# Patient Record
Sex: MALE | Race: WHITE | NOT HISPANIC OR LATINO | Employment: OTHER | URBAN - METROPOLITAN AREA
[De-identification: names, ages, dates, MRNs, and addresses within clinical notes are randomized per-mention and may not be internally consistent; named-entity substitution may affect disease eponyms.]

---

## 2017-01-25 ENCOUNTER — ALLSCRIPTS OFFICE VISIT (OUTPATIENT)
Dept: OTHER | Facility: OTHER | Age: 62
End: 2017-01-25

## 2017-09-05 ENCOUNTER — HOSPITAL ENCOUNTER (OUTPATIENT)
Dept: CT IMAGING | Facility: HOSPITAL | Age: 62
Discharge: HOME | End: 2017-09-05
Attending: THORACIC SURGERY (CARDIOTHORACIC VASCULAR SURGERY)
Payer: COMMERCIAL

## 2017-09-05 DIAGNOSIS — I71.1 RUPTURED ANEURYSM OF THORACIC AORTA (HCC): ICD-10-CM

## 2017-09-05 PROCEDURE — 71250 CT THORAX DX C-: CPT

## 2017-09-13 ENCOUNTER — ALLSCRIPTS OFFICE VISIT (OUTPATIENT)
Dept: OTHER | Facility: OTHER | Age: 62
End: 2017-09-13

## 2017-10-02 ENCOUNTER — HOSPITAL ENCOUNTER (OUTPATIENT)
Dept: NON INVASIVE DIAGNOSTICS | Facility: CLINIC | Age: 62
Discharge: HOME | End: 2017-10-02
Payer: COMMERCIAL

## 2017-10-02 DIAGNOSIS — I77.9 DISORDER OF ARTERY OR ARTERIOLE (HCC): ICD-10-CM

## 2017-10-02 PROCEDURE — 93923 UPR/LXTR ART STDY 3+ LVLS: CPT

## 2017-10-02 PROCEDURE — 93925 LOWER EXTREMITY STUDY: CPT

## 2018-01-04 ENCOUNTER — TRANSCRIBE ORDERS (OUTPATIENT)
Dept: LAB | Facility: CLINIC | Age: 63
End: 2018-01-04

## 2018-01-04 ENCOUNTER — APPOINTMENT (OUTPATIENT)
Dept: LAB | Facility: CLINIC | Age: 63
End: 2018-01-04
Payer: COMMERCIAL

## 2018-01-04 DIAGNOSIS — Q61.3 CONGENITAL POLYCYSTIC KIDNEY DISEASE: ICD-10-CM

## 2018-01-04 DIAGNOSIS — Q61.3 CONGENITAL POLYCYSTIC KIDNEY DISEASE: Primary | ICD-10-CM

## 2018-01-04 LAB
ANION GAP SERPL CALCULATED.3IONS-SCNC: 7 MMOL/L (ref 4–13)
BUN SERPL-MCNC: 29 MG/DL (ref 5–25)
CALCIUM SERPL-MCNC: 9 MG/DL (ref 8.3–10.1)
CHLORIDE SERPL-SCNC: 106 MMOL/L (ref 100–108)
CO2 SERPL-SCNC: 26 MMOL/L (ref 21–32)
CREAT SERPL-MCNC: 1.02 MG/DL (ref 0.6–1.3)
ERYTHROCYTE [DISTWIDTH] IN BLOOD BY AUTOMATED COUNT: 14.6 % (ref 11.6–15.1)
GFR SERPL CREATININE-BSD FRML MDRD: 77 ML/MIN/1.73SQ M
GLUCOSE P FAST SERPL-MCNC: 112 MG/DL (ref 65–99)
HCT VFR BLD AUTO: 42.4 % (ref 36.5–49.3)
HGB BLD-MCNC: 13.7 G/DL (ref 12–17)
MCH RBC QN AUTO: 26.1 PG (ref 26.8–34.3)
MCHC RBC AUTO-ENTMCNC: 32.3 G/DL (ref 31.4–37.4)
MCV RBC AUTO: 81 FL (ref 82–98)
PLATELET # BLD AUTO: 175 THOUSANDS/UL (ref 149–390)
PMV BLD AUTO: 9.2 FL (ref 8.9–12.7)
POTASSIUM SERPL-SCNC: 4.9 MMOL/L (ref 3.5–5.3)
RBC # BLD AUTO: 5.24 MILLION/UL (ref 3.88–5.62)
SODIUM SERPL-SCNC: 139 MMOL/L (ref 136–145)
WBC # BLD AUTO: 5.3 THOUSAND/UL (ref 4.31–10.16)

## 2018-01-04 PROCEDURE — 80048 BASIC METABOLIC PNL TOTAL CA: CPT

## 2018-01-04 PROCEDURE — 36415 COLL VENOUS BLD VENIPUNCTURE: CPT

## 2018-01-04 PROCEDURE — 85027 COMPLETE CBC AUTOMATED: CPT

## 2018-01-10 NOTE — MISCELLANEOUS
Message  Mr Bimal Almaraz was being cared for in our office and Ms Bimal Almaraz was present with her  on 9/13/17  Active Problems    1  Aortic aneurysm, thoracic (441 2) (I71 2)   2  Arterial ectasia (447 8) (I77 9)   3  Benign essential hypertension (401 1) (I10)   4  Congenital polycystic kidney (753 12) (Q61 3)   5  Discoloration of skin (709 00) (L81 9)   6  Hepatic cyst (573 8) (K76 89)   7  Polycystic kidney disease (753 12) (Q61 3)   8  Renal cyst (753 10) (N28 1)   9  Splenic artery aneurysm (442 83) (I72 8)    Current Meds   1  Lisinopril 5 MG Oral Tablet; TAKE ONE (1) TABLET(S) DAILY; Therapy: 89KHU8369 to (Evaluate:62Aal2225)  Requested for: 68Zzw5548; Last   Rx:84Ptm9549; Status: ACTIVE - Retrospective Authorization Ordered   2  Metoprolol Succinate ER 50 MG Oral Tablet Extended Release 24 Hour (Toprol XL);   TAKE 1 AND 1/2 TABLETS DAILY; Therapy: 75ILN4042 to (Evaluate:73Zxz6630)  Requested for: 64Utu0007; Last   Rx:[id number]; Status: ACTIVE - Retrospective Authorization Ordered    Allergies    1  No Known Drug Allergies    2  Bee sting    Plan  Aortic aneurysm, thoracic    · * CT CHEST WO CONTRAST; Status:Need Information - Financial  Authorization,Retrospective Authorization;  Requested for:76Epg8235;    · Follow Up in 2 Years Evaluation and Treatment  Follow-up  Status: Hold For -  Scheduling,Retrospective By Protocol Authorization  Requested for: 76Xip3102    Signatures   Electronically signed by : Jhonny Joy, [facility]; Sep 13 2017  9:31AM EST                       (Author)

## 2018-01-14 VITALS
WEIGHT: 294 LBS | SYSTOLIC BLOOD PRESSURE: 124 MMHG | DIASTOLIC BLOOD PRESSURE: 74 MMHG | HEIGHT: 70 IN | BODY MASS INDEX: 42.09 KG/M2

## 2018-01-14 VITALS
WEIGHT: 297 LBS | OXYGEN SATURATION: 96 % | TEMPERATURE: 97.1 F | RESPIRATION RATE: 14 BRPM | HEART RATE: 56 BPM | DIASTOLIC BLOOD PRESSURE: 78 MMHG | BODY MASS INDEX: 42.52 KG/M2 | SYSTOLIC BLOOD PRESSURE: 122 MMHG | HEIGHT: 70 IN

## 2018-01-23 ENCOUNTER — ALLSCRIPTS OFFICE VISIT (OUTPATIENT)
Dept: OTHER | Facility: OTHER | Age: 63
End: 2018-01-23

## 2018-01-24 NOTE — PROGRESS NOTES
Assessment    1  Congenital polycystic kidney (753 12) (Q61 3)   2  Benign essential hypertension (401 1) (I10)   3  Polycystic kidney disease (753 12) (Q61 3)    Plan  Polycystic kidney disease    · (1) CBC/ PLT (NO DIFF); Status:Active; Requested QTD:47IOJ5364;    Perform:[facility]; FUZ:48CNW2302;IKERAHV; For:Polycystic kidney disease; Ordered By:Shannan Davies;   · (1) COMPREHENSIVE METABOLIC PANEL; Status:Active; Requested PDF:14NVY6354;    Perform:[facility]; CZN:62NOR1579;SDRTDDR; For:Polycystic kidney disease; Ordered By:Shannan Davies;   · (1) URINE PROTEIN CREATININE RATIO; Status:Active; Requested KJC:32RJJ3055;    Perform:[facility]; FME:56HAG8528;HVKMMNN; For:Polycystic kidney disease; Ordered By:Shannan Davies;   · Do not take anti-inflammatory medicines other than aspirin ; Status:Complete;   Done:  38MBL0732   Ordered; For:Polycystic kidney disease; Ordered By:Shannan Davies;   · Restrict the salt in your diet by avoiding highly salted foods ; Status:Complete;   Done:  86HAE7914   Ordered; For:Polycystic kidney disease; Ordered By:Shannan Davies;   · Take your blood pressure twice a day, varying the time of day you check it  Record the  numbers, and bring them with you to your appointment ; Status:Complete;   Done:  24QNT0990   Ordered; For:Polycystic kidney disease; Ordered By:Shannan Davies; Discussion/Summary    1 ) Polycystic kidney disease  - Strong family history with evidence of cysts on both kidneys that are multiple and cyst on the liver  - CTA of the head and neck was negative for aneurysm   No repeat is needed  - Creatinine baseline is around 1 mg/dL  - Urine analysis is bland with no blood and no protein    2 ) Hypertension  - Has a history of a 5 cm thoracic abdominal aortic aneurysm and ideally would aim to keep his systolic blood pressure less than 120 as tolerated  - continue metoprolo 75 mg daily l  -Continue lisinopril 5 mg daily  - Blood pressure slightly above goal in the office but at home it has been ranging right at target  Will continue to monitor  - Educated on sodium restriction diet  - Has some edema of the lower extremities have asked him to avoid salt    3 ) Family history of strokes  - CTA was negative for aneurysm  - Old left thalamic infarct  The patient was counseled regarding diagnostic results, instructions for management, risk factor reductions, prognosis, patient and family education, impressions, risks and benefits of treatment options, importance of compliance with treatment  total time of encounter was 30 minutes  Possible side effects of new medications were reviewed with the patient/guardian today  The treatment plan was reviewed with the patient/guardian  The patient/guardian understands and agrees with the treatment plan   He has no barriers to learning  CKD Teaching includes NFK Guidelines, hypertension management, Avoid nephrotoxic medication, dietician counseling and sodium restriction  Current Patient Status: no anemia and no worsening of renal function  He needs a follow up session in one year   Discussed with the patient      Reason For Visit  Polycystic kidney disease and hypertension      History of Present Illness  I had the pleasure seeing was today in the renal clinic for the continued management of his polycystic kidney disease and hypertension  Since our last visit he has had no ER visits or hospitalizations  He otherwise feels well and has no complaints  He reports no chest pain or shortness of Breath no swelling the legs  He checks his blood pressure about once or twice a week and reports on average it ranges around 015 systolic  His blood pressures at his last Cardiology evaluation were also at target  Overall he is doing quite well with no complaints  Review of Systems    Constitutional: no fever and no chills  Integumentary: no rashes  Gastrointestinal: no nausea, no diarrhea and no vomiting  Respiratory: no shortness of breath     Cardiovascular: no chest pain and no lower extremity edema  Musculoskeletal: no joint pain and no joint swelling  Neurological: no headache, no lightheadedness and no dizziness  Genitourinary: no dysuria  ROS reviewed  Past Medical History    The active problems and past medical history were reviewed and updated today  Surgical History    The surgical history was reviewed and updated today  Family History    The family history was reviewed and updated today  Social History  The social history was reviewed and updated today  The social history was reviewed and is unchanged  Current Meds   1  Lisinopril 5 MG Oral Tablet; TAKE ONE (1) TABLET(S) DAILY; Therapy: 12DFW9425 to (Kim Mccarthy)  Requested for: 58Qcb5842; Last   Rx:[id number] Ordered   2  Metoprolol Succinate ER 50 MG Oral Tablet Extended Release 24 Hour; TAKE 1 AND 1/2   TABLETS DAILY; Therapy: 63UFQ1289 to (Kim Mccarthy)  Requested for: 91Wap2971; Last   Rx:66Rre1669 Ordered    The medication list was reviewed and updated today  Allergies    1  No Known Drug Allergies    2  Bee sting    Vitals  Vital Signs    Recorded: 85CWB0261 10:41AM Recorded: 35UET3279 06:34RM   Systolic 548    Diastolic 80    Height  5 ft 10 in   Weight  299 lb    BMI Calculated  42 9   BSA Calculated  2 48     Physical Exam    Constitutional: General appearance: No acute distress, well appearing and well nourished  ENT: External ears and nose appear normal      Eyes: Anicteric sclerae  Neck: No bruit heard over either carotid  JVD:  No JVD present  Pulmonary: Respiratory effort: No increased work of breathing or signs of respiratory distress  Auscultation of lungs: Clear to auscultation  Cardiovascular: Auscultation of heart: Normal rate and rhythm, normal S1 and S2, without murmurs  Abdomen: Non-tender, no masses  Extremities: No cyanosis, clubbing or edema     Pulses: Dorsalis Pedis and Posterior Tibial pulses normal    Rash: No rash present  Neurologic: Non Focal      Psychiatric: Orientation to person, place, and time: Normal   and Mood and affect: Normal     Back: No CVA tenderness  Results/Data  Diagnostic Studies Reviewed: I personally reviewed the films/images/results in the office today  My interpretation follows  Thank you very much for seeing this patient  If you have any questions, please do not hesitate to contact me        Signatures   Electronically signed by : CARLIE Palacio ; Jan 23 2018 10:43AM EST                       (Author)

## 2018-01-24 NOTE — CONSULTS
I am referring Timothy Moon to you for further evaluation  My most recent evaluation follows:      Reason For Visit  Polycystic kidney disease and hypertension      History of Present Illness  I had the pleasure seeing was today in the renal clinic for the continued management of his polycystic kidney disease and hypertension  Since our last visit he has had no ER visits or hospitalizations  He otherwise feels well and has no complaints  He reports no chest pain or shortness of Breath no swelling the legs  He checks his blood pressure about once or twice a week and reports on average it ranges around 268 systolic  His blood pressures at his last Cardiology evaluation were also at target  Overall he is doing quite well with no complaints  Review of Systems    Constitutional: no fever and no chills  Integumentary: no rashes  Gastrointestinal: no nausea, no diarrhea and no vomiting  Respiratory: no shortness of breath  Cardiovascular: no chest pain and no lower extremity edema  Musculoskeletal: no joint pain and no joint swelling  Neurological: no headache, no lightheadedness and no dizziness  Genitourinary: no dysuria  ROS reviewed  Past Medical History    The active problems and past medical history were reviewed and updated today  Surgical History    The surgical history was reviewed and updated today  Family History    The family history was reviewed and updated today  Social History  The social history was reviewed and updated today  The social history was reviewed and is unchanged  Current Meds   1  Lisinopril 5 MG Oral Tablet; TAKE ONE (1) TABLET(S) DAILY; Therapy: 99RSP9365 to (Munir Ram)  Requested for: 12Dec2017; Last   Rx:01Dec2017 Ordered   2  Metoprolol Succinate ER 50 MG Oral Tablet Extended Release 24 Hour; TAKE 1 AND 1/2   TABLETS DAILY;    Therapy: 47XZI9996 to (Munir Ram)  Requested for: 12Dec2017; Last   Rx:01Dec2017 Ordered    The medication list was reviewed and updated today  Allergies    1  No Known Drug Allergies    2  Bee sting    Vitals   Recorded: 05KDH0463 10:41AM Recorded: 38ISS2759 00:83VX   Systolic 842    Diastolic 80    Height  5 ft 10 in   Weight  299 lb    BMI Calculated  42 9   BSA Calculated  2 48     Physical Exam    Constitutional: General appearance: No acute distress, well appearing and well nourished  ENT: External ears and nose appear normal      Eyes: Anicteric sclerae  Neck: No bruit heard over either carotid  JVD:  No JVD present  Pulmonary: Respiratory effort: No increased work of breathing or signs of respiratory distress  Auscultation of lungs: Clear to auscultation  Cardiovascular: Auscultation of heart: Normal rate and rhythm, normal S1 and S2, without murmurs  Abdomen: Non-tender, no masses  Extremities: No cyanosis, clubbing or edema  Pulses: Dorsalis Pedis and Posterior Tibial pulses normal    Rash: No rash present  Neurologic: Non Focal      Psychiatric: Orientation to person, place, and time: Normal   and Mood and affect: Normal     Back: No CVA tenderness  Results/Data  I personally reviewed the films/images/results in the office today  My interpretation follows  Assessment    1  Congenital polycystic kidney (753 12) (Q61 3)   2  Benign essential hypertension (401 1) (I10)   3  Polycystic kidney disease (753 12) (Q61 3)    Plan  Polycystic kidney disease    · (1) CBC/ PLT (NO DIFF); Status:Active; Requested VFK:89SPT4851;    Perform:[facility]; UNB:06GMF7107;IBYXBOV; For:Polycystic kidney disease; Ordered By:Shannan Davies;   · (1) COMPREHENSIVE METABOLIC PANEL; Status:Active; Requested VXD:93FRF4179;    Perform:[facility]; ITX:09XOC7623;OILNXVK; For:Polycystic kidney disease; Ordered By:Shannan Davies;   · (1) URINE PROTEIN CREATININE RATIO; Status:Active; Requested LUR:78DID5034;    Perform:[facility]; QHM:77UMN5328;GGWRTEX;   For:Polycystic kidney disease; Ordered By:Shannan Davies;   · Do not take anti-inflammatory medicines other than aspirin ; Status:Complete;   Done:  98TKB6366   Ordered; For:Polycystic kidney disease; Ordered By:Shannan Davies;   · Restrict the salt in your diet by avoiding highly salted foods ; Status:Complete;   Done:  76KVO2527   Ordered; For:Polycystic kidney disease; Ordered By:Shannan Davies;   · Take your blood pressure twice a day, varying the time of day you check it  Record the  numbers, and bring them with you to your appointment ; Status:Complete;   Done:  23TOP4959   Ordered; For:Polycystic kidney disease; Ordered By:Shannan Davies; Discussion/Summary    1 ) Polycystic kidney disease  - Strong family history with evidence of cysts on both kidneys that are multiple and cyst on the liver  - CTA of the head and neck was negative for aneurysm  No repeat is needed  - Creatinine baseline is around 1 mg/dL  - Urine analysis is bland with no blood and no protein    2 ) Hypertension  - Has a history of a 5 cm thoracic abdominal aortic aneurysm and ideally would aim to keep his systolic blood pressure less than 120 as tolerated  - continue metoprolo 75 mg daily l  -Continue lisinopril 5 mg daily  - Blood pressure slightly above goal in the office but at home it has been ranging right at target  Will continue to monitor  - Educated on sodium restriction diet  - Has some edema of the lower extremities have asked him to avoid salt    3 ) Family history of strokes  - CTA was negative for aneurysm  - Old left thalamic infarct  The patient was counseled regarding diagnostic results, instructions for management, risk factor reductions, prognosis, patient and family education, impressions, risks and benefits of treatment options, importance of compliance with treatment  total time of encounter was 30 minutes  Possible side effects of new medications were reviewed with the patient/guardian today  The treatment plan was reviewed with the patient/guardian  The patient/guardian understands and agrees with the treatment plan   He has no barriers to learning  CKD Teaching includes NFK Guidelines, hypertension management, Avoid nephrotoxic medication, dietician counseling and sodium restriction  Current Patient Status: no anemia and no worsening of renal function  He needs a follow up session in one year   Discussed with the patient      Thank you very much for seeing this patient  If you have any questions, please do not hesitate to contact me        Signatures   Electronically signed by : CARLIE Latif ; Jan 23 2018 10:43AM EST                       (Author)

## 2018-03-28 DIAGNOSIS — I10 ESSENTIAL HYPERTENSION: Primary | ICD-10-CM

## 2018-03-28 RX ORDER — METOPROLOL SUCCINATE 50 MG/1
1.5 TABLET, EXTENDED RELEASE ORAL DAILY
COMMUNITY
Start: 2016-01-19 | End: 2018-03-28 | Stop reason: SDUPTHER

## 2018-03-29 RX ORDER — METOPROLOL SUCCINATE 50 MG/1
75 TABLET, EXTENDED RELEASE ORAL DAILY
Qty: 135 TABLET | Refills: 3 | Status: SHIPPED | OUTPATIENT
Start: 2018-03-29 | End: 2019-04-03 | Stop reason: SDUPTHER

## 2018-07-16 ENCOUNTER — TELEPHONE (OUTPATIENT)
Dept: VASCULAR SURGERY | Facility: CLINIC | Age: 63
End: 2018-07-16

## 2018-07-16 NOTE — TELEPHONE ENCOUNTER
Received call from pt's wife asking if pt is cleared by Dr Alma Omalley for oral sx  She said a form was sent on 7/5  We do not have any form here, nothing in Dr Malcolm Rodriguez  I called the oral sx office [phone] and asked them to refax over the form

## 2018-07-16 NOTE — TELEPHONE ENCOUNTER
Received fax from [facility] regarding pt's upcoming sx that he needs clearance for  It looks like they are requesting medical clearance  I called pt back and left VM informing him that we cannot provide medical clearance, only vascular  He will need to contact his PCP regarding medical clearance

## 2018-08-04 ENCOUNTER — APPOINTMENT (OUTPATIENT)
Dept: LAB | Facility: HOSPITAL | Age: 63
End: 2018-08-04
Payer: COMMERCIAL

## 2018-08-04 ENCOUNTER — TRANSCRIBE ORDERS (OUTPATIENT)
Dept: ADMINISTRATIVE | Facility: HOSPITAL | Age: 63
End: 2018-08-04

## 2018-08-04 DIAGNOSIS — E78.2 MIXED HYPERLIPIDEMIA: ICD-10-CM

## 2018-08-04 DIAGNOSIS — E78.2 MIXED HYPERLIPIDEMIA: Primary | ICD-10-CM

## 2018-08-04 DIAGNOSIS — Z00.8 HEALTH EXAMINATION IN POPULATION SURVEY: Primary | ICD-10-CM

## 2018-08-04 LAB
ANION GAP SERPL CALCULATED.3IONS-SCNC: 7 MMOL/L (ref 4–13)
BUN SERPL-MCNC: 16 MG/DL (ref 5–25)
CALCIUM SERPL-MCNC: 8.9 MG/DL (ref 8.3–10.1)
CHLORIDE SERPL-SCNC: 104 MMOL/L (ref 100–108)
CHOLEST SERPL-MCNC: 126 MG/DL (ref 50–200)
CO2 SERPL-SCNC: 27 MMOL/L (ref 21–32)
CREAT SERPL-MCNC: 1.02 MG/DL (ref 0.6–1.3)
EST. AVERAGE GLUCOSE BLD GHB EST-MCNC: 108 MG/DL
GFR SERPL CREATININE-BSD FRML MDRD: 77 ML/MIN/1.73SQ M
GLUCOSE P FAST SERPL-MCNC: 94 MG/DL (ref 65–99)
HBA1C MFR BLD: 5.4 % (ref 4.2–6.3)
HDLC SERPL-MCNC: 39 MG/DL (ref 40–60)
LDLC SERPL CALC-MCNC: 68 MG/DL (ref 0–100)
NONHDLC SERPL-MCNC: 87 MG/DL
POTASSIUM SERPL-SCNC: 4.2 MMOL/L (ref 3.5–5.3)
PSA SERPL-MCNC: 2.5 NG/ML (ref 0–4)
SODIUM SERPL-SCNC: 138 MMOL/L (ref 136–145)
TRIGL SERPL-MCNC: 95 MG/DL

## 2018-08-04 PROCEDURE — 36415 COLL VENOUS BLD VENIPUNCTURE: CPT

## 2018-08-04 PROCEDURE — 80048 BASIC METABOLIC PNL TOTAL CA: CPT

## 2018-08-04 PROCEDURE — 83036 HEMOGLOBIN GLYCOSYLATED A1C: CPT | Performed by: PREVENTIVE MEDICINE

## 2018-08-04 PROCEDURE — 80061 LIPID PANEL: CPT | Performed by: INTERNAL MEDICINE

## 2018-08-04 PROCEDURE — 84153 ASSAY OF PSA TOTAL: CPT

## 2018-08-27 DIAGNOSIS — I10 ESSENTIAL HYPERTENSION: Primary | ICD-10-CM

## 2018-08-27 RX ORDER — LISINOPRIL 5 MG/1
1 TABLET ORAL DAILY
COMMUNITY
Start: 2016-07-13 | End: 2018-08-27 | Stop reason: SDUPTHER

## 2018-08-28 RX ORDER — LISINOPRIL 5 MG/1
5 TABLET ORAL DAILY
Qty: 90 TABLET | Refills: 3 | Status: SHIPPED | OUTPATIENT
Start: 2018-08-28 | End: 2019-08-07 | Stop reason: SDUPTHER

## 2018-12-21 ENCOUNTER — TELEPHONE (OUTPATIENT)
Dept: NEPHROLOGY | Facility: CLINIC | Age: 63
End: 2018-12-21

## 2018-12-31 ENCOUNTER — APPOINTMENT (OUTPATIENT)
Dept: LAB | Facility: HOSPITAL | Age: 63
End: 2018-12-31
Attending: INTERNAL MEDICINE
Payer: COMMERCIAL

## 2018-12-31 ENCOUNTER — TRANSCRIBE ORDERS (OUTPATIENT)
Dept: ADMINISTRATIVE | Facility: HOSPITAL | Age: 63
End: 2018-12-31

## 2018-12-31 DIAGNOSIS — Q61.3 CONGENITAL POLYCYSTIC KIDNEY DISEASE: Primary | ICD-10-CM

## 2018-12-31 LAB
ALBUMIN SERPL BCP-MCNC: 3.3 G/DL (ref 3.5–5)
ALP SERPL-CCNC: 59 U/L (ref 46–116)
ALT SERPL W P-5'-P-CCNC: 18 U/L (ref 12–78)
ANION GAP SERPL CALCULATED.3IONS-SCNC: 8 MMOL/L (ref 4–13)
AST SERPL W P-5'-P-CCNC: 8 U/L (ref 5–45)
BASOPHILS # BLD AUTO: 0.02 THOUSANDS/ΜL (ref 0–0.1)
BASOPHILS NFR BLD AUTO: 0 % (ref 0–1)
BILIRUB SERPL-MCNC: 0.3 MG/DL (ref 0.2–1)
BUN SERPL-MCNC: 26 MG/DL (ref 5–25)
CALCIUM SERPL-MCNC: 8.4 MG/DL (ref 8.3–10.1)
CHLORIDE SERPL-SCNC: 107 MMOL/L (ref 100–108)
CO2 SERPL-SCNC: 25 MMOL/L (ref 21–32)
CREAT SERPL-MCNC: 0.89 MG/DL (ref 0.6–1.3)
CREAT UR-MCNC: 177 MG/DL
EOSINOPHIL # BLD AUTO: 0.16 THOUSAND/ΜL (ref 0–0.61)
EOSINOPHIL NFR BLD AUTO: 3 % (ref 0–6)
ERYTHROCYTE [DISTWIDTH] IN BLOOD BY AUTOMATED COUNT: 13.5 % (ref 11.6–15.1)
GFR SERPL CREATININE-BSD FRML MDRD: 90 ML/MIN/1.73SQ M
GLUCOSE P FAST SERPL-MCNC: 102 MG/DL (ref 65–99)
HCT VFR BLD AUTO: 43 % (ref 36.5–49.3)
HGB BLD-MCNC: 13.2 G/DL (ref 12–17)
IMM GRANULOCYTES # BLD AUTO: 0.04 THOUSAND/UL (ref 0–0.2)
IMM GRANULOCYTES NFR BLD AUTO: 1 % (ref 0–2)
LYMPHOCYTES # BLD AUTO: 0.75 THOUSANDS/ΜL (ref 0.6–4.47)
LYMPHOCYTES NFR BLD AUTO: 15 % (ref 14–44)
MCH RBC QN AUTO: 25.5 PG (ref 26.8–34.3)
MCHC RBC AUTO-ENTMCNC: 30.7 G/DL (ref 31.4–37.4)
MCV RBC AUTO: 83 FL (ref 82–98)
MONOCYTES # BLD AUTO: 0.62 THOUSAND/ΜL (ref 0.17–1.22)
MONOCYTES NFR BLD AUTO: 12 % (ref 4–12)
NEUTROPHILS # BLD AUTO: 3.58 THOUSANDS/ΜL (ref 1.85–7.62)
NEUTS SEG NFR BLD AUTO: 69 % (ref 43–75)
NRBC BLD AUTO-RTO: 0 /100 WBCS
PLATELET # BLD AUTO: 184 THOUSANDS/UL (ref 149–390)
PMV BLD AUTO: 10 FL (ref 8.9–12.7)
POTASSIUM SERPL-SCNC: 4 MMOL/L (ref 3.5–5.3)
PROT SERPL-MCNC: 6.9 G/DL (ref 6.4–8.2)
PROT UR-MCNC: 12 MG/DL
PROT/CREAT UR: 0.07 MG/G{CREAT} (ref 0–0.1)
RBC # BLD AUTO: 5.17 MILLION/UL (ref 3.88–5.62)
SODIUM SERPL-SCNC: 140 MMOL/L (ref 136–145)
WBC # BLD AUTO: 5.17 THOUSAND/UL (ref 4.31–10.16)

## 2018-12-31 PROCEDURE — 36415 COLL VENOUS BLD VENIPUNCTURE: CPT | Performed by: INTERNAL MEDICINE

## 2018-12-31 PROCEDURE — 84156 ASSAY OF PROTEIN URINE: CPT | Performed by: INTERNAL MEDICINE

## 2018-12-31 PROCEDURE — 80053 COMPREHEN METABOLIC PANEL: CPT | Performed by: INTERNAL MEDICINE

## 2018-12-31 PROCEDURE — 85025 COMPLETE CBC W/AUTO DIFF WBC: CPT | Performed by: INTERNAL MEDICINE

## 2018-12-31 PROCEDURE — 82570 ASSAY OF URINE CREATININE: CPT | Performed by: INTERNAL MEDICINE

## 2019-01-24 ENCOUNTER — OFFICE VISIT (OUTPATIENT)
Dept: NEPHROLOGY | Facility: CLINIC | Age: 64
End: 2019-01-24
Payer: COMMERCIAL

## 2019-01-24 VITALS — SYSTOLIC BLOOD PRESSURE: 118 MMHG | BODY MASS INDEX: 41.42 KG/M2 | HEIGHT: 71 IN | DIASTOLIC BLOOD PRESSURE: 68 MMHG

## 2019-01-24 DIAGNOSIS — Q61.3 POLYCYSTIC KIDNEY DISEASE: ICD-10-CM

## 2019-01-24 DIAGNOSIS — I10 ESSENTIAL HYPERTENSION: Primary | ICD-10-CM

## 2019-01-24 PROCEDURE — 99214 OFFICE O/P EST MOD 30 MIN: CPT | Performed by: INTERNAL MEDICINE

## 2019-01-24 NOTE — PROGRESS NOTES
NEPHROLOGY OFFICE VISIT   Segundo Goddard 70 y o  male MRN: [id number]  1/24/2019    Reason for Visit:  Polycystic kidney disease    ASSESSMENT and PLAN:    I had the pleasure of seeing Anna Knox today in the renal clinic for the continued management of polycystic kidney disease  Since our last visit, there has been no ER visits or hospitilizations  He currently has no complaints at this time and is feeling well  Patient denies any chest pain, shortness of breath and swelling  The last blood work was done on December 2018, which we have reviewed together  1 ) Polycystic kidney disease  - Strong family history with evidence of cysts on both kidneys that are multiple and cyst on the liver  - CTA of the head and neck was negative for aneurysm  No repeat is needed  - Creatinine baseline is around 1 mg/dL  - Urine analysis is bland with no blood and no protein  -urine protein creatinine ratio is 0 07  -renal function is stable, creatinine 0 89 mg/dL  -reports no hematuria, no flank pain     2 ) Hypertension  - Has a history of a 5 cm thoracic abdominal aortic aneurysm and ideally would aim to keep his systolic blood pressure less than 120 as tolerated  -target blood pressure less than 120/80  - continue metoprolol XL 75 mg daily  -Continue lisinopril 5 mg daily  -blood pressure is under excellent control, continue current management  - Educated on sodium restriction diet     3 ) Family history of strokes  - CTA was negative for aneurysm  - Old left thalamic infarct  PATIENT INSTRUCTIONS:    Patient Instructions   1 )  Low 2 g sodium diet    2 )  Monitor weights at home    3 )  Avoid NSAIDs    4 )  Monitor blood pressure at home, call if blood pressure greater than 150/90 persistently            Orders Placed This Encounter   Procedures    Comprehensive metabolic panel     This is a patient instruction: Patient fasting for 8 hours or longer recommended       Standing Status:   Future     Standing Expiration Date: 1/24/2020    Protein / creatinine ratio, urine    CBC     Standing Status:   Future     Standing Expiration Date:   1/24/2020       OBJECTIVE:  Current Weight:    Vitals:    01/24/19 1041   BP: 118/68   Height: 5' 11" (1 803 m)    Body mass index is 41 42 kg/m²  REVIEW OF SYSTEMS:    Review of Systems   Constitutional: Negative for activity change and fever  Respiratory: Negative for cough, chest tightness, shortness of breath and wheezing  Cardiovascular: Negative for chest pain and leg swelling  Gastrointestinal: Negative for abdominal pain, diarrhea, nausea and vomiting  Endocrine: Negative for polyuria  Genitourinary: Negative for difficulty urinating, dysuria, flank pain, frequency and urgency  Skin: Negative for rash  Neurological: Negative for dizziness, syncope, light-headedness and headaches  PHYSICAL EXAM:      Physical Exam   Constitutional: He is oriented to person, place, and time  He appears well-developed and well-nourished  No distress  HENT:   Head: Normocephalic and atraumatic  Eyes: Pupils are equal, round, and reactive to light  No scleral icterus  Neck: Normal range of motion  Neck supple  Cardiovascular: Normal rate, regular rhythm and normal heart sounds  Exam reveals no gallop and no friction rub  No murmur heard  Pulmonary/Chest: Effort normal and breath sounds normal  No respiratory distress  He has no wheezes  He has no rales  He exhibits no tenderness  Abdominal: Soft  Bowel sounds are normal  He exhibits no distension  There is no tenderness  There is no rebound  Musculoskeletal: Normal range of motion  He exhibits no edema  Neurological: He is alert and oriented to person, place, and time  Skin: No rash noted  He is not diaphoretic  Psychiatric: He has a normal mood and affect  Nursing note and vitals reviewed        Medications:    Current Outpatient Prescriptions:     lisinopril (ZESTRIL) 5 mg tablet, Take 1 tablet (5 mg total) by mouth daily, Disp: 90 tablet, Rfl: 3    metoprolol succinate (TOPROL-XL) 50 mg 24 hr tablet, Take 1 5 tablets (75 mg total) by mouth daily, Disp: 135 tablet, Rfl: 3    Laboratory Results:        Invalid input(s): ALBUMIN    Results for orders placed or performed in visit on 12/31/18   Comprehensive metabolic panel   Result Value Ref Range    Sodium 140 136 - 145 mmol/L    Potassium 4 0 3 5 - 5 3 mmol/L    Chloride 107 100 - 108 mmol/L    CO2 25 21 - 32 mmol/L    ANION GAP 8 4 - 13 mmol/L    BUN 26 (H) 5 - 25 mg/dL    Creatinine 0 89 0 60 - 1 30 mg/dL    Glucose, Fasting 102 (H) 65 - 99 mg/dL    Calcium 8 4 8 3 - 10 1 mg/dL    AST 8 5 - 45 U/L    ALT 18 12 - 78 U/L    Alkaline Phosphatase 59 46 - 116 U/L    Total Protein 6 9 6 4 - 8 2 g/dL    Albumin 3 3 (L) 3 5 - 5 0 g/dL    Total Bilirubin 0 30 0 20 - 1 00 mg/dL    eGFR 90 ml/min/1 73sq m   CBC and differential   Result Value Ref Range    WBC 5 17 4 31 - 10 16 Thousand/uL    RBC 5 17 3 88 - 5 62 Million/uL    Hemoglobin 13 2 12 0 - 17 0 g/dL    Hematocrit 43 0 36 5 - 49 3 %    MCV 83 82 - 98 fL    MCH 25 5 (L) 26 8 - 34 3 pg    MCHC 30 7 (L) 31 4 - 37 4 g/dL    RDW 13 5 11 6 - 15 1 %    MPV 10 0 8 9 - 12 7 fL    Platelets 815 162 - 583 Thousands/uL    nRBC 0 /100 WBCs    Neutrophils Relative 69 43 - 75 %    Immat GRANS % 1 0 - 2 %    Lymphocytes Relative 15 14 - 44 %    Monocytes Relative 12 4 - 12 %    Eosinophils Relative 3 0 - 6 %    Basophils Relative 0 0 - 1 %    Neutrophils Absolute 3 58 1 85 - 7 62 Thousands/µL    Immature Grans Absolute 0 04 0 00 - 0 20 Thousand/uL    Lymphocytes Absolute 0 75 0 60 - 4 47 Thousands/µL    Monocytes Absolute 0 62 0 17 - 1 22 Thousand/µL    Eosinophils Absolute 0 16 0 00 - 0 61 Thousand/µL    Basophils Absolute 0 02 0 00 - 0 10 Thousands/µL   Protein / creatinine ratio, urine   Result Value Ref Range    Creatinine, Ur 177 0 mg/dL    Protein Urine Random 12 mg/dL    Prot/Creat Ratio, Ur 0 07 0 00 - 0 10

## 2019-04-03 DIAGNOSIS — I10 ESSENTIAL HYPERTENSION: ICD-10-CM

## 2019-04-04 RX ORDER — METOPROLOL SUCCINATE 50 MG/1
75 TABLET, EXTENDED RELEASE ORAL DAILY
Qty: 135 TABLET | Refills: 3 | Status: SHIPPED | OUTPATIENT
Start: 2019-04-04 | End: 2020-04-22 | Stop reason: SDUPTHER

## 2019-07-15 DIAGNOSIS — I71.2 THORACIC AORTIC ANEURYSM WITHOUT RUPTURE (HCC): Primary | ICD-10-CM

## 2019-08-07 DIAGNOSIS — I10 ESSENTIAL HYPERTENSION: ICD-10-CM

## 2019-08-08 RX ORDER — LISINOPRIL 5 MG/1
5 TABLET ORAL DAILY
Qty: 90 TABLET | Refills: 3 | Status: SHIPPED | OUTPATIENT
Start: 2019-08-08 | End: 2020-08-06 | Stop reason: SDUPTHER

## 2019-08-22 ENCOUNTER — HOSPITAL ENCOUNTER (OUTPATIENT)
Dept: CT IMAGING | Facility: HOSPITAL | Age: 64
Discharge: HOME | End: 2019-08-22
Attending: THORACIC SURGERY (CARDIOTHORACIC VASCULAR SURGERY)
Payer: COMMERCIAL

## 2019-08-22 DIAGNOSIS — I71.2 THORACIC AORTIC ANEURYSM WITHOUT RUPTURE (HCC): ICD-10-CM

## 2019-08-22 PROCEDURE — 71250 CT THORAX DX C-: CPT

## 2019-09-11 ENCOUNTER — OFFICE VISIT (OUTPATIENT)
Dept: CARDIAC SURGERY | Facility: CLINIC | Age: 64
End: 2019-09-11
Payer: COMMERCIAL

## 2019-09-11 VITALS
DIASTOLIC BLOOD PRESSURE: 84 MMHG | BODY MASS INDEX: 39.62 KG/M2 | OXYGEN SATURATION: 97 % | SYSTOLIC BLOOD PRESSURE: 132 MMHG | RESPIRATION RATE: 14 BRPM | HEART RATE: 51 BPM | HEIGHT: 71 IN | WEIGHT: 283 LBS | TEMPERATURE: 97.4 F

## 2019-09-11 DIAGNOSIS — I71.2 THORACIC AORTIC ANEURYSM WITHOUT RUPTURE (HCC): Primary | ICD-10-CM

## 2019-09-11 PROCEDURE — 99214 OFFICE O/P EST MOD 30 MIN: CPT | Performed by: PHYSICIAN ASSISTANT

## 2019-09-11 RX ORDER — IRON POLYSACCHARIDE COMPLEX 150 MG
150 CAPSULE ORAL DAILY
COMMUNITY
End: 2019-12-12 | Stop reason: ALTCHOICE

## 2019-09-11 NOTE — PROGRESS NOTES
Progress Note- Cardiothoracic Surgery   Marianna Goddard 75 y o  male MRN: [id number]      Reason for Consult / Principal Problem: Aortic aneurysm    History of Present Illness: Reji Humphrey is a 75y o  year old male who presents today for ongoing surveillance of their ascending aortic aneurysm  This was initially identified in 2015  Reji Humphrey was most recently evaluated in our office in 2017  At that time the maximal ascending aortic diameter was measureed at 48 mm  Over the past 2 years, Mr Yuan Eckert reports no changes to his medical history  He reports he is feeling well and denies chest pain, back pain or shortness of breath  He reports his blood pressure is well controlled  Past Medical History:  Past Medical History:   Diagnosis Date    Arterial ectasia ([address] )     Congenital polycystic kidney     Hepatic cyst     Hypertension     Renal cyst     Splenic artery aneurysm (HCC)     Thoracic aneurysm without mention of rupture          Past Surgical History:   Past Surgical History:   Procedure Laterality Date    KNEE SURGERY Bilateral     ROTATOR CUFF REPAIR      SPLENIC ARTERY EMBOLIZATION           Family History:  Family History   Problem Relation Age of Onset    Aneurysm Brother     Aneurysm Maternal Aunt     Aneurysm Family     Heart disease Mother     Heart attack Cousin          Social History:    Social History     Substance and Sexual Activity   Alcohol Use No     Social History     Substance and Sexual Activity   Drug Use No     Social History     Tobacco Use   Smoking Status Former Smoker   Smokeless Tobacco Never Used       Marital Status: [unfilled]    Home Medications:   Prior to Admission medications    Medication Sig Start Date End Date Taking?  Authorizing Provider   lisinopril (ZESTRIL) 5 mg tablet Take 1 tablet (5 mg total) by mouth daily 8/8/19  Yes Kvng Arellano MD   metoprolol succinate (TOPROL-XL) 50 mg 24 hr tablet Take 1 5 tablets (75 mg total) by mouth daily 4/4/19  Yes Juli Guzman MD       Allergies:  No Known Allergies    Review of Systems:  Review of Systems - 12 point ROS negative  History obtained from the patient  General ROS: negative for - chills, fatigue, fever, night sweats, weight gain or weight loss  Respiratory ROS: no cough, shortness of breath, or wheezing  Cardiovascular ROS: no chest pain or dyspnea on exertion  Gastrointestinal ROS: no abdominal pain, change in bowel habits, or black or bloody stools  Musculoskeletal ROS: negative for - gait disturbance, joint stiffness, muscle pain or muscular weakness  Neurological ROS: no TIA or stroke symptoms    Vital Signs:     Vitals:    09/11/19 0800 09/11/19 0837   BP: 142/82 132/84   BP Location: Left arm Right arm   Cuff Size: Adult    Pulse: (!) 51    Resp: 14    Temp: (!) 97 4 °F (36 3 °C)    TempSrc: Tympanic    SpO2: 97%    Weight: 128 kg (283 lb)    Height: 5' 11" (1 803 m)      Invasive Devices     None                 Physical Exam:    HEENT/NECK:  PERRLA  No jugular venous distention  Cardiac:Regular rate and rhythm  Pulmonary:  Breath sounds clear bilaterally  Abdomen:  Non-tender, Non-distended  Positive bowel sounds  Lower extremities: Extremities warm/dry  Radial/PT/DP pulses 2+ bilaterally  No edema B/L  Neuro: Alert and oriented X 3  Sensation is grossly intact  No focal deficits  Skin: Warm/Dry, without rashes or lesions  Lab Results:               Invalid input(s): LABGLOM      Lab Results   Component Value Date    HGBA1C 5 4 08/04/2018     No results found for: CKTOTAL, CKMB, CKMBINDEX, TROPONINI    Imaging Studies:     CT Chest 8/22/19: Stable ascending thoracic aortic caliber measuring up to 4 8 cm      I have personally reviewed pertinent films in PACS    Assessment:  Patient Active Problem List    Diagnosis Date Noted    Polycystic kidney disease 01/24/2019    Essential hypertension 01/24/2019     Ascending aortic aneurysm;  Ongoing ascending aortic aneurysm surveillance  Plan:    CT imaging performed prior to this visit demonstrates the ascending aorta measuring 48 mm in size at its greatest diameter  These findings were confirmed and shared with the patient today  As they are asymptomatic, with no family history, follow-up monitoring is the treatment plan  Arrangements have been made for future surveillance to be completed with CT chest, without contrast in 2 Years  Amos Mora was comfortable with our recommendations, and their questions were answered to their satisfaction  Thank you for allowing us to participate in the care of this patient  Aortic Aneurysm Instructions were provided to the patient as follows:    1  No lifting more than 50 pounds  2  Maintain a controlled blood pressure with a goal of 120/80  3  Follow up in Aortic Clinic as recommended with radiology follow up as instructed  4  Report to the ER or call 911 immediately with the following signs / symptoms: sudden onset of back pain, chest pain or shortness of breath  5  Tobacco cessation discussed      SIGNATURE: Azalea Rojas PA-C  DATE: September 11, 2019  TIME: 8:49 AM

## 2019-09-11 NOTE — LETTER
September 11, 2019     [address]    Patient: Vandana Laura   YOB: 1955   Date of Visit: 9/11/2019       Dear Dr Evangelista Aguilar:    Thank you for referring [facility] DIANA JOHNSON to me for evaluation  Below are my notes for this consultation  If you have questions, please do not hesitate to call me  I look forward to following your patient along with you  Sincerely,        Darwin Snow, DO        CC: No Recipients  [place], [place]  9/11/2019  8:54 AM  Attested  Progress Note- Cardiothoracic Surgery   Beena Goddard 75 y o  male MRN: [id number]      Reason for Consult / Principal Problem: Aortic aneurysm    History of Present Illness: Vandana Laura is a 75y o  year old male who presents today for ongoing surveillance of their ascending aortic aneurysm  This was initially identified in 2015  Vandana Laura was most recently evaluated in our office in 2017  At that time the maximal ascending aortic diameter was measureed at 48 mm  Over the past 2 years, Mr Vu Ellison reports no changes to his medical history  He reports he is feeling well and denies chest pain, back pain or shortness of breath  He reports his blood pressure is well controlled      Past Medical History:  Past Medical History:   Diagnosis Date    Arterial ectasia ([address] )     Congenital polycystic kidney     Hepatic cyst     Hypertension     Renal cyst     Splenic artery aneurysm (HCC)     Thoracic aneurysm without mention of rupture          Past Surgical History:   Past Surgical History:   Procedure Laterality Date    KNEE SURGERY Bilateral     ROTATOR CUFF REPAIR      SPLENIC ARTERY EMBOLIZATION           Family History:  Family History   Problem Relation Age of Onset    Aneurysm Brother     Aneurysm Maternal Aunt     Aneurysm Family     Heart disease Mother     Heart attack Cousin          Social History:    Social History     Substance and Sexual Activity   Alcohol Use No     Social History     Substance and Sexual Activity   Drug Use No     Social History     Tobacco Use   Smoking Status Former Smoker   Smokeless Tobacco Never Used       Marital Status: [unfilled]    Home Medications:   Prior to Admission medications    Medication Sig Start Date End Date Taking? Authorizing Provider   lisinopril (ZESTRIL) 5 mg tablet Take 1 tablet (5 mg total) by mouth daily 8/8/19  Yes Gibson Platt MD   metoprolol succinate (TOPROL-XL) 50 mg 24 hr tablet Take 1 5 tablets (75 mg total) by mouth daily 4/4/19  Yes Gibson Platt MD       Allergies:  No Known Allergies    Review of Systems:  Review of Systems - 12 point ROS negative  History obtained from the patient  General ROS: negative for - chills, fatigue, fever, night sweats, weight gain or weight loss  Respiratory ROS: no cough, shortness of breath, or wheezing  Cardiovascular ROS: no chest pain or dyspnea on exertion  Gastrointestinal ROS: no abdominal pain, change in bowel habits, or black or bloody stools  Musculoskeletal ROS: negative for - gait disturbance, joint stiffness, muscle pain or muscular weakness  Neurological ROS: no TIA or stroke symptoms    Vital Signs:     Vitals:    09/11/19 0800 09/11/19 0837   BP: 142/82 132/84   BP Location: Left arm Right arm   Cuff Size: Adult    Pulse: (!) 51    Resp: 14    Temp: (!) 97 4 °F (36 3 °C)    TempSrc: Tympanic    SpO2: 97%    Weight: 128 kg (283 lb)    Height: 5' 11" (1 803 m)      Invasive Devices     None                 Physical Exam:    HEENT/NECK:  PERRLA  No jugular venous distention  Cardiac:Regular rate and rhythm  Pulmonary:  Breath sounds clear bilaterally  Abdomen:  Non-tender, Non-distended  Positive bowel sounds  Lower extremities: Extremities warm/dry  Radial/PT/DP pulses 2+ bilaterally  No edema B/L  Neuro: Alert and oriented X 3  Sensation is grossly intact  No focal deficits  Skin: Warm/Dry, without rashes or lesions      Lab Results:               Invalid input(s): LABGLOM      Lab Results   Component Value Date    HGBA1C 5 4 08/04/2018     No results found for: CKTOTAL, CKMB, CKMBINDEX, TROPONINI    Imaging Studies:     CT Chest 8/22/19: Stable ascending thoracic aortic caliber measuring up to 4 8 cm      I have personally reviewed pertinent films in PACS    Assessment:  Patient Active Problem List    Diagnosis Date Noted    Polycystic kidney disease 01/24/2019    Essential hypertension 01/24/2019     Ascending aortic aneurysm; Ongoing ascending aortic aneurysm surveillance  Plan:    CT imaging performed prior to this visit demonstrates the ascending aorta measuring 48 mm in size at its greatest diameter  These findings were confirmed and shared with the patient today  As they are asymptomatic, with no family history, follow-up monitoring is the treatment plan  Arrangements have been made for future surveillance to be completed with CT chest, without contrast in 2 Years  Garciadebi Bentley was comfortable with our recommendations, and their questions were answered to their satisfaction  Thank you for allowing us to participate in the care of this patient  Aortic Aneurysm Instructions were provided to the patient as follows:    1  No lifting more than 50 pounds  2  Maintain a controlled blood pressure with a goal of 120/80  3  Follow up in Aortic Clinic as recommended with radiology follow up as instructed  4  Report to the ER or call 911 immediately with the following signs / symptoms: sudden onset of back pain, chest pain or shortness of breath  5  Tobacco cessation discussed  [place], [place]  DATE: September 11, 2019  TIME: 8:49 AM  Attestation signed by Kanchan Vizcarra DO at 9/11/2019  9:27 AM:  The patient was seen and examined, and I agree with the midlevel's history, physical exam, assessment and plan with the following additions:    Dexter Wilhelm was seen in the office for evaluation of his ascending aorta    This is unchanged from his prior study measures approximately 48 mm in size  I recommended continued to year surveillance

## 2019-10-03 ENCOUNTER — APPOINTMENT (OUTPATIENT)
Dept: LAB | Facility: CLINIC | Age: 64
End: 2019-10-03
Payer: COMMERCIAL

## 2019-10-03 ENCOUNTER — OFFICE VISIT (OUTPATIENT)
Dept: FAMILY MEDICINE CLINIC | Facility: CLINIC | Age: 64
End: 2019-10-03
Payer: MEDICARE

## 2019-10-03 VITALS
TEMPERATURE: 97.6 F | WEIGHT: 284.61 LBS | RESPIRATION RATE: 16 BRPM | SYSTOLIC BLOOD PRESSURE: 120 MMHG | OXYGEN SATURATION: 95 % | DIASTOLIC BLOOD PRESSURE: 80 MMHG | HEART RATE: 60 BPM | HEIGHT: 69 IN | BODY MASS INDEX: 42.16 KG/M2

## 2019-10-03 DIAGNOSIS — Z11.59 NEED FOR HEPATITIS C SCREENING TEST: ICD-10-CM

## 2019-10-03 DIAGNOSIS — Z13.220 SCREENING, LIPID: ICD-10-CM

## 2019-10-03 DIAGNOSIS — Z23 ENCOUNTER FOR VACCINATION: ICD-10-CM

## 2019-10-03 DIAGNOSIS — Z12.5 SCREENING PSA (PROSTATE SPECIFIC ANTIGEN): ICD-10-CM

## 2019-10-03 DIAGNOSIS — Z91.030 ALLERGIC TO BEES: ICD-10-CM

## 2019-10-03 DIAGNOSIS — I10 ESSENTIAL HYPERTENSION: Primary | ICD-10-CM

## 2019-10-03 DIAGNOSIS — Z86.73 HX OF ISCHEMIC VERTEBROBASILAR ARTERY THALAMIC STROKE: ICD-10-CM

## 2019-10-03 DIAGNOSIS — I10 ESSENTIAL HYPERTENSION: ICD-10-CM

## 2019-10-03 DIAGNOSIS — Z12.11 SCREEN FOR COLON CANCER: ICD-10-CM

## 2019-10-03 PROBLEM — I72.4 FEMORAL ARTERY ANEURYSM, BILATERAL (HCC): Status: ACTIVE | Noted: 2019-10-03

## 2019-10-03 LAB
ALBUMIN SERPL BCP-MCNC: 3.7 G/DL (ref 3.5–5)
ALP SERPL-CCNC: 47 U/L (ref 46–116)
ALT SERPL W P-5'-P-CCNC: 28 U/L (ref 12–78)
ANION GAP SERPL CALCULATED.3IONS-SCNC: 9 MMOL/L (ref 4–13)
AST SERPL W P-5'-P-CCNC: 21 U/L (ref 5–45)
BILIRUB SERPL-MCNC: 0.6 MG/DL (ref 0.2–1)
BUN SERPL-MCNC: 22 MG/DL (ref 5–25)
CALCIUM SERPL-MCNC: 9.5 MG/DL (ref 8.3–10.1)
CHLORIDE SERPL-SCNC: 104 MMOL/L (ref 100–108)
CHOLEST SERPL-MCNC: 177 MG/DL (ref 50–200)
CO2 SERPL-SCNC: 27 MMOL/L (ref 21–32)
CREAT SERPL-MCNC: 1.01 MG/DL (ref 0.6–1.3)
GFR SERPL CREATININE-BSD FRML MDRD: 77 ML/MIN/1.73SQ M
GLUCOSE P FAST SERPL-MCNC: 98 MG/DL (ref 65–99)
HCV AB SER QL: NORMAL
HDLC SERPL-MCNC: 54 MG/DL (ref 40–60)
LDLC SERPL CALC-MCNC: 101 MG/DL (ref 0–100)
POTASSIUM SERPL-SCNC: 4.7 MMOL/L (ref 3.5–5.3)
PROT SERPL-MCNC: 7.7 G/DL (ref 6.4–8.2)
PSA SERPL-MCNC: 3.2 NG/ML (ref 0–4)
SODIUM SERPL-SCNC: 140 MMOL/L (ref 136–145)
TRIGL SERPL-MCNC: 112 MG/DL

## 2019-10-03 PROCEDURE — G0103 PSA SCREENING: HCPCS

## 2019-10-03 PROCEDURE — 90670 PCV13 VACCINE IM: CPT | Performed by: FAMILY MEDICINE

## 2019-10-03 PROCEDURE — 90715 TDAP VACCINE 7 YRS/> IM: CPT | Performed by: FAMILY MEDICINE

## 2019-10-03 PROCEDURE — 99203 OFFICE O/P NEW LOW 30 MIN: CPT | Performed by: FAMILY MEDICINE

## 2019-10-03 PROCEDURE — 80061 LIPID PANEL: CPT

## 2019-10-03 PROCEDURE — 80053 COMPREHEN METABOLIC PANEL: CPT

## 2019-10-03 PROCEDURE — 90471 IMMUNIZATION ADMIN: CPT | Performed by: FAMILY MEDICINE

## 2019-10-03 PROCEDURE — 36415 COLL VENOUS BLD VENIPUNCTURE: CPT

## 2019-10-03 PROCEDURE — 86803 HEPATITIS C AB TEST: CPT

## 2019-10-03 PROCEDURE — G0009 ADMIN PNEUMOCOCCAL VACCINE: HCPCS | Performed by: FAMILY MEDICINE

## 2019-10-03 RX ORDER — EPINEPHRINE 0.3 MG/.3ML
0.3 INJECTION SUBCUTANEOUS
COMMUNITY
Start: 2018-09-07 | End: 2019-10-03 | Stop reason: SDUPTHER

## 2019-10-03 RX ORDER — EPINEPHRINE 0.3 MG/.3ML
0.3 INJECTION SUBCUTANEOUS ONCE
Qty: 0.6 ML | Refills: 0 | Status: SHIPPED | OUTPATIENT
Start: 2019-10-03 | End: 2022-05-20 | Stop reason: SDUPTHER

## 2019-10-03 NOTE — PROGRESS NOTES
Assessment/Plan:       Problem List Items Addressed This Visit        Cardiovascular and Mediastinum    Essential hypertension - Primary     Stressed importance of good control given his multiple aneurysms  Currently controlled         Relevant Orders    Comprehensive metabolic panel (Completed)    Lipid Panel with Direct LDL reflex (Completed)       Other    Hx of ischemic vertebrobasilar artery thalamic stroke     Sensory loss on entire R side of body since age 21  We discussed the importance of having his wife (and himself) check for skin lesions/wound on his R side since he has no sensation on that side  Since he has dealt with this for quite some time, he has found ways to adapt and he uses only white towels so he will notice blood in case he has a cut/wound, he checks skin, 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  Other Visit Diagnoses     Encounter for vaccination        Relevant Orders    TDAP VACCINE GREATER THAN OR EQUAL TO 6YO IM (Completed)    PNEUMOCOCCAL CONJUGATE VACCINE 13-VALENT GREATER THAN 6 MONTHS (Completed)    Screen for colon cancer        Relevant Orders    Ambulatory referral to Colorectal Surgery    Allergic to bees        Screening, lipid        Screening PSA (prostate specific antigen)        Relevant Orders    PSA, Total Screen (Completed)    Need for hepatitis C screening test        Relevant Orders    Hepatitis C antibody (Completed)         Declines flu vax        Subjective:     Shay Paredes is a 75 y o  male here today and has the below chronic conditions:    Patient Active Problem List   Diagnosis    Polycystic kidney disease    Essential hypertension    [address] [address]    Aortic aneurysm, thoracic (HCC)    Splenic artery aneurysm (HCC)    Hepatic cyst     Current Outpatient Medications   Medication Sig Dispense Refill    iron polysaccharides (FERREX) 150 mg capsule Take 150 mg by mouth daily      lisinopril (ZESTRIL) 5 mg tablet Take 1 tablet (5 mg total) by mouth daily 90 tablet 3    metoprolol succinate (TOPROL-XL) 50 mg 24 hr tablet Take 1 5 tablets (75 mg total) by mouth daily 135 tablet 3     No current facility-administered medications for this visit  HPI:  Chief Complaint   Patient presents with    Physical Exam     - CC above per clinical staff and reviewed  Pt here to establish  Has been feeling well overall  Following w/ cardiology for thoracic aortic aneurysm    Has femoral aotric aneurysms  Due for f/u  Chronic pain R shoulder  4 rotator cuff surgeries  Wasn't able to re-attach  A tendon  No sensation     Hx of CVA which he was not aware of until he had an MRI  Had this when he was 21  L thalamic stroke  Notes some HA occasional  Recently had some neck pain and HA for a few days  This has resolved  Has home bp machine and bp was fine  Admits not drinking enough fluid  The following portions of the patient's history were reviewed and updated as appropriate: allergies, current medications, past family history, past medical history, past social history, past surgical history and problem list     ROS:  Review of Systems   No fever, chills, congestion, chest pain, shortness of breath, nausea, vomiting, diarrhea, constipation, blood in stool, urinary concerns, mood changes  Rest of ROS neg except as above  Objective:      /80   Pulse 60   Temp 97 6 °F (36 4 °C)   Resp 16   Ht 5' 8 9" (1 75 m)   Wt 129 kg (284 lb 9 8 oz)   SpO2 95%   BMI 42 15 kg/m²   BP Readings from Last 3 Encounters:   10/03/19 120/80   09/11/19 132/84   01/24/19 118/68     Wt Readings from Last 3 Encounters:   10/03/19 129 kg (284 lb 9 8 oz)   09/11/19 128 kg (283 lb)   09/13/17 135 kg (297 lb)               Physical Exam:   Physical Exam   Constitutional: He is oriented to person, place, and time  He appears well-developed and well-nourished  HENT:   Head: Normocephalic and atraumatic     Eyes: Conjunctivae are normal  Neck: Neck supple  Cardiovascular: Normal rate, regular rhythm and normal heart sounds  No murmur heard  Pulmonary/Chest: Effort normal and breath sounds normal  No respiratory distress  He has no wheezes  Abdominal: Soft  There is no tenderness  There is no rebound and no guarding  Obese abdomen   Musculoskeletal: He exhibits edema (1+ b/l ankles)  Lymphadenopathy:     He has no cervical adenopathy  Neurological: He is alert and oriented to person, place, and time  R hemisensory loss to light and deep touch   Skin: Skin is warm and dry  Psychiatric: He has a normal mood and affect  His behavior is normal    Nursing note and vitals reviewed  BMI Counseling: Body mass index is 42 15 kg/m²  Discussed the patient's BMI with him  The BMI is above normal  Nutrition recommendations include reducing portion sizes

## 2019-10-04 NOTE — ASSESSMENT & PLAN NOTE
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

## 2019-10-09 ENCOUNTER — TELEPHONE (OUTPATIENT)
Dept: FAMILY MEDICINE CLINIC | Facility: CLINIC | Age: 64
End: 2019-10-09

## 2019-10-09 PROBLEM — E78.5 HYPERLIPIDEMIA, MILD: Status: ACTIVE | Noted: 2019-10-09

## 2019-10-09 NOTE — TELEPHONE ENCOUNTER
Would like a response from the my chart message from 10/04/19  You can leave a detailed message on his machine or they can call back

## 2019-10-09 NOTE — PROGRESS NOTES
The 10-year ASCVD risk score (Rubio Fitzpatrick et al , 2013) is: 12 7%    Values used to calculate the score:      Age: 75 years      Sex: Male      Is Non- : No      Diabetic: No      Tobacco smoker: No      Systolic Blood Pressure: 211 mmHg      Is BP treated: Yes      HDL Cholesterol: 54 mg/dL      Total Cholesterol: 177 mg/dL

## 2019-10-10 NOTE — TELEPHONE ENCOUNTER
I just sent a [employer] message back to him- will also send note to be sure they rec'd note about labs

## 2019-10-21 DIAGNOSIS — I72.4 FEMORAL ARTERY ANEURYSM, BILATERAL (HCC): Primary | ICD-10-CM

## 2019-10-28 ENCOUNTER — HOSPITAL ENCOUNTER (OUTPATIENT)
Dept: RADIOLOGY | Facility: HOSPITAL | Age: 64
Discharge: HOME | End: 2019-10-28
Payer: COMMERCIAL

## 2019-10-28 DIAGNOSIS — I72.4 FEMORAL ARTERY ANEURYSM, BILATERAL (HCC): ICD-10-CM

## 2019-10-28 PROCEDURE — 93923 UPR/LXTR ART STDY 3+ LVLS: CPT

## 2019-10-28 PROCEDURE — 93925 LOWER EXTREMITY STUDY: CPT

## 2019-10-29 PROCEDURE — 93925 LOWER EXTREMITY STUDY: CPT | Performed by: SURGERY

## 2019-10-29 PROCEDURE — 93922 UPR/L XTREMITY ART 2 LEVELS: CPT | Performed by: SURGERY

## 2019-11-15 ENCOUNTER — OFFICE VISIT (OUTPATIENT)
Dept: VASCULAR SURGERY | Facility: CLINIC | Age: 64
End: 2019-11-15
Payer: COMMERCIAL

## 2019-11-15 VITALS
DIASTOLIC BLOOD PRESSURE: 76 MMHG | RESPIRATION RATE: 16 BRPM | HEART RATE: 62 BPM | HEIGHT: 71 IN | TEMPERATURE: 98 F | WEIGHT: 288 LBS | SYSTOLIC BLOOD PRESSURE: 122 MMHG | BODY MASS INDEX: 40.32 KG/M2

## 2019-11-15 DIAGNOSIS — I10 ESSENTIAL HYPERTENSION: ICD-10-CM

## 2019-11-15 DIAGNOSIS — I72.4 POPLITEAL ARTERY ANEURYSM, BILATERAL (HCC): Primary | ICD-10-CM

## 2019-11-15 DIAGNOSIS — E78.5 HYPERLIPIDEMIA, MILD: ICD-10-CM

## 2019-11-15 DIAGNOSIS — I71.2 THORACIC AORTIC ANEURYSM WITHOUT RUPTURE (HCC): ICD-10-CM

## 2019-11-15 DIAGNOSIS — I72.4 FEMORAL ARTERY ANEURYSM, BILATERAL (HCC): ICD-10-CM

## 2019-11-15 PROCEDURE — 99204 OFFICE O/P NEW MOD 45 MIN: CPT | Performed by: PHYSICIAN ASSISTANT

## 2019-11-15 NOTE — LETTER
November 17, 2019     [address]  [address] [facility]    Patient: Yenifer Goddard   YOB: 1955   Date of Visit: 11/15/2019     Dear Dr Aranza Heaton      Thank you for referring Inge Adams to me for evaluation  Below are the relevant portions of my assessment and plan of care  If you have questions, please do not hesitate to call me  I look forward to following Les along with you  Sincerely,        Contreras Hubbard PA-C        CC: Referral Self    Progress Notes:      Assessment/Plan:    Femoral artery aneurysm, bilateral (HCC)    Femoral artery ectasias  Popliteal artery ectasias  Hypertension  Hyperlipidemia  Venous stasis changes/ edema  Hx stroke at age 21 with RIGHT sided sensory loss  Ascending thoracic aneurysm followed by Dr Elease Lanes    -No chest pain, pressure, shortness of breath  No claudication  No abdominal pain, back pain     -Current medications include: Toprol XL 50 and lisinopril 5  -He is not on asa or statin therapy  -LDL goal < 100, but idealy < 70  -No ASA due to PKD  -Remote smoker   -Family history of aneurysms    STEPHEN 10/28/19  R  0 83 /+254/166; no significant arterial occlusive disease; ectatic SFA 1 1 cm/ popliteal 0 8 cm  L 0 1 02/+254/163; no significant arterial occlusive disease; ectatic SFA 1 0 cm/ popliteal 1 0 cm    Lipids  Tg 112 H 54 L101    Discussion:  Patient with 4 8 cm thoracic aneurysm followed by CTS  On PE, he has a prominent RIGHT femoral pulse  He is found to have femoral artery and popliteal ectasias on imaging studies  He may also have family history of aneurysms  Currently, the femoral and popliteal enlargements do not meet criteria to be considered aneurysms  They can be monitored annually by duplex at this point  He is not known to have a AAA  We will do a screening aortoilliac duplex  He was told not to take aspirin due to polycystic kidney disease   He is not on a statin but with aneurysms, we discussed that we should consider it which is something that he will think about      -Recommend optimal medical therapy with good blood pressure and cholesterol control  -LDL goal < 100 (ideally < 70)    He is not on a statin but given vascular disease, we should consider statin therapy   -Blood pressure in the office looks good today; 122/76; monitor BP at home   -We discussed healthy lifestyle changes - diet, wt loss and regular exercise    -Trial of compression stockings and conservative measures for leg edema   -Check aorto-iliac study to rule out aneurysm  -Follow up lower extremity duplex in 1 year to monitor femoral and popliteal ectasias  -Patient education provided regarding aneurysm   -Screening of adult children  -Follow up in 1 year    Aortic aneurysm, thoracic (HCC)  -4 8 cm Ascending TAA followed by CTS    Popliteal artery ectasias, bilateral (HCC)  -R 0 8 cm, L 1 cm  -annual surveillance as discussed under femoral ectasias

## 2019-11-15 NOTE — PROGRESS NOTES
Assessment/Plan:    Femoral artery aneurysm, bilateral (HCC)    Femoral artery ectasias  Popliteal artery ectasias  Hypertension  Hyperlipidemia  Venous stasis changes/ edema  Hx stroke at age 21 with RIGHT sided sensory loss  Ascending thoracic aneurysm followed by Dr Linda Rodriguez    -No chest pain, pressure, shortness of breath  No claudication  No abdominal pain, back pain     -Current medications include: Toprol XL 50 and lisinopril 5  -He is not on asa or statin therapy  -LDL goal < 100, but idealy < 70  -No ASA due to PKD  -Remote smoker   -Family history of aneurysms    STEPHEN 10/28/19  R  0 83 /+254/166; no significant arterial occlusive disease; ectatic SFA 1 1 cm/ popliteal 0 8 cm  L 0 1 02/+254/163; no significant arterial occlusive disease; ectatic SFA 1 0 cm/ popliteal 1 0 cm    Lipids  Tg 112 H 54 L101    Discussion:  Patient with 4 8 cm thoracic aneurysm followed by CTS  On PE, he has a prominent RIGHT femoral pulse  He is found to have femoral artery and popliteal ectasias on imaging studies  He may also have family history of aneurysms  Currently, the femoral and popliteal enlargements do not meet criteria to be considered aneurysms  They can be monitored annually by duplex at this point  He is not known to have a AAA  We will do a screening aortoilliac duplex  He was told not to take aspirin due to polycystic kidney disease  He is not on a statin but with aneurysms, we discussed that we should consider it which is something that he will think about      -Recommend optimal medical therapy with good blood pressure and cholesterol control  -LDL goal < 100 (ideally < 70)    He is not on a statin but given vascular disease, we should consider statin therapy   -Blood pressure in the office looks good today; 122/76; monitor BP at home   -We discussed healthy lifestyle changes - diet, wt loss and regular exercise    -Trial of compression stockings and conservative measures for leg edema   -Check aorto-iliac study to rule out aneurysm  -Follow up lower extremity duplex in 1 year to monitor femoral and popliteal ectasias  -Patient education provided regarding aneurysm   -Screening of adult children  -Follow up in 1 year    Aortic aneurysm, thoracic (HCC)  -4 8 cm Ascending TAA followed by CTS    Popliteal artery ectasias, bilateral (HCC)  -R 0 8 cm, L 1 cm  -annual surveillance as discussed under femoral ectasias         Subjective:      Patient ID: Rosalie Blank is a 75 y o  male  Pt is new to our practice and is here to be evaluated for bilateral lower extremity femoral artery aneurysms  Pt denies any claudication with walking  Pt also denies any numbness or tingling in his legs  Pt had a STEPHEN on 10/28/19  Pt is not taking any blood thinning medications or statins  HPI    Les Goddard 75 y o  M 4 8 cm throracic aortic aneurysm, hypertension, hyperlipidemia, chronic kidney disease who presents for evaluation of femoral and popliteal ectasias  Alma Delia Ro has no chest pain, sob, abdominal pain, or claudication  STEPHEN 10/28/19  R  0 83 /+254/166; no significant arterial occlusive disease; ectatic SFA 1 1 cm/ popliteal 0 8 cm  L 0 1 02/+254/163; no significant arterial occlusive disease; ectatic SFA 1 0 cm/ popliteal 1 0 cm      The following portions of the patient's history were reviewed and updated as appropriate: allergies, current medications, past family history, past medical history, past social history, past surgical history and problem list     Review of Systems   Constitutional: Negative  HENT: Positive for nosebleeds  Eyes: Negative  Respiratory: Negative  Cardiovascular: Negative  Gastrointestinal: Negative  Endocrine: Negative  Genitourinary: Negative  Musculoskeletal: Positive for arthralgias  Skin: Negative  Allergic/Immunologic: Negative  Neurological: Positive for numbness  Hematological: Negative  Psychiatric/Behavioral: Negative  Objective:      /76 (BP Location: Left arm, Patient Position: Sitting, Cuff Size: Adult)   Pulse 62   Temp 98 °F (36 7 °C) (Tympanic)   Resp 16   Ht 5' 11" (1 803 m)   Wt 131 kg (288 lb)   BMI 40 17 kg/m²      Physical Exam   Constitutional: He is oriented to person, place, and time  He appears well-developed and well-nourished  He is cooperative  HENT:   Head: Normocephalic and atraumatic  Eyes: Pupils are equal, round, and reactive to light  EOM are normal    Neck: Trachea normal  Neck supple  No JVD present  No thyromegaly present  Cardiovascular: Normal rate, regular rhythm, S1 normal, S2 normal and normal heart sounds  Exam reveals no gallop and no friction rub  No murmur heard  Pulses:       Carotid pulses are 2+ on the right side, and 2+ on the left side  Radial pulses are 2+ on the right side, and 2+ on the left side  Femoral pulses are 3+ on the right side, and 2+ on the left side  Posterior tibial pulses are 2+ on the right side  Pulmonary/Chest: Effort normal and breath sounds normal  No accessory muscle usage  No respiratory distress  He has no wheezes  He has no rales  Abdominal: Soft  Bowel sounds are normal  He exhibits no distension  There is no hepatosplenomegaly  There is no tenderness  Musculoskeletal: Normal range of motion  He exhibits edema (1+ pitting edema)  He exhibits no deformity  Neurological: He is alert and oriented to person, place, and time  Grossly normal    Skin: Skin is warm and dry  No lesion and no rash noted  No cyanosis  Nails show no clubbing  Psychiatric: He has a normal mood and affect  Nursing note and vitals reviewed        STEPHEN 10/28/19  FINDINGS:     Right                  Impression  Waveform   PSV  AP (cm)    Common Femoral Artery  Normal                 113             Prox Profunda                                  57             Prox SFA               Ectatic                 88      0 8    Mid SFA Ectatic                 73      1 0    Dist SFA               Ectatic                 88      0 9    Proximal Pop           Ectatic                 54      0 8    Distal Pop             Ectatic                 63      0 8    Tibioperoneal                                  33             Dist Post Tibial                   Triphasic  100             Prox  Ant  Tibial                              45             Dist  Ant  Tibial                  Triphasic   58             Dist Peroneal                      Triphasic   39                Left                   Impression  Waveform   PSV  AP (cm)    Common Femoral Artery  Normal                  69             Prox Profunda                                  54             Prox SFA               Ectatic                 62      0 9    Mid SFA                Ectatic                 72      1 0    Dist SFA               Ectatic                 72      0 8    Proximal Pop           Ectatic                 63      0 9    Distal Pop             Ectatic                 52      0 8    Tibioperoneal                                  40             Dist Post Tibial                   Triphasic   81             Prox  Ant  Tibial                              35             Dist  Ant  Tibial                  Triphasic  121             Dist Peroneal                      Triphasic   68                      CONCLUSION:     Impression:  RIGHT LOWER LIMB:  Evaluation shows no evidence of significant lower extremity arterial occlusive  disease  There is an ectatic superficial femoral artery 1 1 cm prior 0 9cm, popliteal  artery 0 83 prior 1 0  Ankle/Brachial index:  1 05 , Prior 1 14  PVR/ PPG tracings are normal   Metatarsal pressure of  +254mmHg, [id number]  Great toe pressure of  166mmHg, within the healing range, Prior 119mmHg  LEFT LOWER LIMB:  Evaluation shows no evidence of significant lower extremity arterial occlusive  disease    There is an ectatic superficial femoral artery 1 0 cm prior 1 1cm, popliteal  artery 1 0cm prior 1 0cm  Ankle/Brachial index: 1 02 , Prior 1 16  PVR/ PPG tracings are normal or dampened  Metatarsal pressure of  +254mmHg, Prior 183 mmHg  Great toe pressure of  163mmHg, within the healing range, Prior 129mmHg  There is a well defined hypoechoic non-vascularized cystic-type structure noted  in the popliteal fossa  Compared to previous study on 10/02/2017  , there is no significant change  I have reviewed and made appropriate changes to the review of systems input by the medical assistant  Vitals:    11/15/19 1018   BP: 122/76   BP Location: Left arm   Patient Position: Sitting   Cuff Size: Adult   Pulse: 62   Resp: 16   Temp: 98 °F (36 7 °C)   TempSrc: Tympanic   Weight: 131 kg (288 lb)   Height: 5' 11" (1 803 m)       Patient Active Problem List   Diagnosis    Polycystic kidney disease    Essential hypertension    Arterial ectasia (HCC)    Aortic aneurysm, thoracic (HCC)    Hepatic cyst    Femoral artery aneurysm, bilateral (HCC)    Hx of ischemic vertebrobasilar artery thalamic stroke    Hyperlipidemia, mild    Popliteal artery ectasias, bilateral (HCC)       Past Surgical History:   Procedure Laterality Date    KNEE SURGERY Bilateral     LITHOTRIPSY  05/12/2010    ROTATOR CUFF REPAIR      SHOULDER SURGERY Bilateral 03/28/1986    contusion     SPLENIC ARTERY EMBOLIZATION         Family History   Problem Relation Age of Onset    Aneurysm Brother         Ascending aortic aneurysm    Aneurysm Maternal Aunt     Heart disease Cousin     Heart disease Mother     Emphysema Father     No Known Problems Paternal Grandmother     No Known Problems Paternal Grandfather        Social History     Socioeconomic History    Marital status: /Civil Union     Spouse name:  Ree Estimable     Number of children: 2    Years of education: Not on file    Highest education level: Not on file   Occupational History    Occupation: RETIRED   Social Needs  Financial resource strain: Not on file   [address] insecurity:     Worry: Not on file     Inability: Not on file    Transportation needs:     Medical: Not on file     Non-medical: Not on file   Tobacco Use    Smoking status: Former Smoker     Packs/day: 2 00     Years: 23 00     Pack years: 46 00     Types: Cigarettes     Last attempt to quit: 10/3/1989     Years since quittin 1    Smokeless tobacco: Never Used   Substance and Sexual Activity    Alcohol use: Yes     Frequency: Monthly or less     Drinks per session: 1 or 2     Binge frequency: Never    Drug use: No    Sexual activity: Not on file   Lifestyle    Physical activity:     Days per week: Not on file     Minutes per session: Not on file    Stress: Not on file   Relationships    Social connections:     Talks on phone: Not on file     Gets together: Not on file     Attends Bahai service: Not on file     Active member of club or organization: Not on file     Attends meetings of clubs or organizations: Not on file     Relationship status: Not on file    Intimate partner violence:     Fear of current or ex partner: Not on file     Emotionally abused: Not on file     Physically abused: Not on file     Forced sexual activity: Not on file   Other Topics Concern    Not on file   Social History Narrative    Exercises regularly        Allergies   Allergen Reactions    Hymenoptera Venom Preparations Anaphylaxis         Current Outpatient Medications:     EPINEPHrine (EPIPEN 2-STEPHANI) 0 3 mg/0 3 mL SOAJ, Inject 0 3 mL (0 3 mg total) into a muscle once for 1 dose Then go to to the ER, Disp: 0 6 mL, Rfl: 0    lisinopril (ZESTRIL) 5 mg tablet, Take 1 tablet (5 mg total) by mouth daily, Disp: 90 tablet, Rfl: 3    metoprolol succinate (TOPROL-XL) 50 mg 24 hr tablet, Take 1 5 tablets (75 mg total) by mouth daily, Disp: 135 tablet, Rfl: 3    Elastic Bandages & Supports ([address]) MISC, by Does not apply route daily Knee High 20-30mmHg, Disp: 2 each, Rfl: 4    iron polysaccharides (FERREX) 150 mg capsule, Take 150 mg by mouth daily, Disp: , Rfl:

## 2019-11-15 NOTE — PATIENT INSTRUCTIONS
Femoral artery ectasias  Popliteal artery ectasias  Hypertension  Hyperlipidemia  Venous stasis changes/ edema  Hx stroke at age 21 with RIGHT sensory loss  Ascending thoracic aneurysm followed by Dr Kevin Cancino      Lipids  Tg 112 H 54 L101    -No chest pain, pressure, shortness of breath  No claudication  No abdominal pain, back pain     -Current medications include: Toprol XL 50 and lisinopril 5  -He is not on asa or statin therapy  -LDL goal < 100, but idealy < 70  -No ASA due to PKD  -Remote smoker   -Family history of aneurysms       -Recommend optimal medical therapy with good blood pressure and cholesterol control  -LDL goal < 100 (ideally < 70)  He is not on a statin but given vascular disease, we should consider statin therapy   -Blood pressure in the office looks good today; 122/76 elevated today in the office which should be watched     -Good heart healthy diet, weight loss and regular exercise for overall cardiovascular health and fitness   -Trial of compression stockings and conservative measures for edema   -Check aorto-iliac study to rule out aneurysm  -Follow up lower extremity duplex in 1 year to monitor SFA and popliteal ectasias  -Patient education provided regarding aneurysm   -Screening of adult children    STEPHEN 10/28/19  R  0 83 /+254/166; no significant arterial occlusive disease; ectatic SFA 1 1 cm/ popliteal 0 8 cm  L 0 1 02/+254/163; no significant arterial occlusive disease; ectatic SFA 1 0 cm/ popliteal 1 0 cm

## 2019-11-17 NOTE — ASSESSMENT & PLAN NOTE
Femoral artery ectasias  Popliteal artery ectasias  Hypertension  Hyperlipidemia  Venous stasis changes/ edema  Hx stroke at age 21 with RIGHT sided sensory loss  Ascending thoracic aneurysm followed by Dr Elease Lanes    -No chest pain, pressure, shortness of breath  No claudication  No abdominal pain, back pain     -Current medications include: Toprol XL 50 and lisinopril 5  -He is not on asa or statin therapy  -LDL goal < 100, but idealy < 70  -No ASA due to PKD  -Remote smoker   -Family history of aneurysms    STEPHEN 10/28/19  R  0 83 /+254/166; no significant arterial occlusive disease; ectatic SFA 1 1 cm/ popliteal 0 8 cm  L 0 1 02/+254/163; no significant arterial occlusive disease; ectatic SFA 1 0 cm/ popliteal 1 0 cm    Lipids  Tg 112 H 54 L101    Discussion:  Patient with 4 8 cm thoracic aneurysm followed by CTS  On PE, he has a prominent RIGHT femoral pulse  He is found to have femoral artery and popliteal ectasias on imaging studies  He may also have family history of aneurysms  Currently, the femoral and popliteal enlargements do not meet criteria to be considered aneurysms  They can be monitored annually by duplex at this point  He is not known to have a AAA  We will do a screening aortoilliac duplex  He was told not to take aspirin due to polycystic kidney disease  He is not on a statin but with aneurysms, we discussed that we should consider it which is something that he will think about      -Recommend optimal medical therapy with good blood pressure and cholesterol control  -LDL goal < 100 (ideally < 70)    He is not on a statin but given vascular disease, we should consider statin therapy   -Blood pressure in the office looks good today; 122/76; monitor BP at home   -We discussed healthy lifestyle changes - diet, wt loss and regular exercise    -Trial of compression stockings and conservative measures for leg edema   -Check aorto-iliac study to rule out aneurysm  -Follow up lower extremity duplex in 1 year to monitor femoral and popliteal ectasias  -Patient education provided regarding aneurysm   -Screening of adult children  -Follow up in 1 year

## 2019-12-04 ENCOUNTER — HOSPITAL ENCOUNTER (OUTPATIENT)
Dept: RADIOLOGY | Facility: HOSPITAL | Age: 64
Discharge: HOME | End: 2019-12-04
Payer: COMMERCIAL

## 2019-12-04 DIAGNOSIS — I72.4 POPLITEAL ARTERY ANEURYSM, BILATERAL (HCC): ICD-10-CM

## 2019-12-04 DIAGNOSIS — E78.5 HYPERLIPIDEMIA, MILD: ICD-10-CM

## 2019-12-04 DIAGNOSIS — I10 ESSENTIAL HYPERTENSION: ICD-10-CM

## 2019-12-04 DIAGNOSIS — I71.2 THORACIC AORTIC ANEURYSM WITHOUT RUPTURE (HCC): ICD-10-CM

## 2019-12-04 DIAGNOSIS — I72.4 FEMORAL ARTERY ANEURYSM, BILATERAL (HCC): ICD-10-CM

## 2019-12-04 PROCEDURE — 93978 VASCULAR STUDY: CPT | Performed by: SURGERY

## 2019-12-04 PROCEDURE — 93978 VASCULAR STUDY: CPT

## 2019-12-12 ENCOUNTER — OFFICE VISIT (OUTPATIENT)
Dept: FAMILY MEDICINE CLINIC | Facility: CLINIC | Age: 64
End: 2019-12-12
Payer: COMMERCIAL

## 2019-12-12 VITALS
OXYGEN SATURATION: 98 % | SYSTOLIC BLOOD PRESSURE: 132 MMHG | WEIGHT: 287.2 LBS | HEART RATE: 60 BPM | RESPIRATION RATE: 12 BRPM | BODY MASS INDEX: 42.54 KG/M2 | HEIGHT: 69 IN | DIASTOLIC BLOOD PRESSURE: 78 MMHG | TEMPERATURE: 97.5 F

## 2019-12-12 DIAGNOSIS — Z00.00 MEDICARE ANNUAL WELLNESS VISIT, SUBSEQUENT: Primary | ICD-10-CM

## 2019-12-12 DIAGNOSIS — Z12.11 COLON CANCER SCREENING: ICD-10-CM

## 2019-12-12 PROCEDURE — G0439 PPPS, SUBSEQ VISIT: HCPCS | Performed by: FAMILY MEDICINE

## 2019-12-12 PROCEDURE — 3008F BODY MASS INDEX DOCD: CPT | Performed by: FAMILY MEDICINE

## 2019-12-12 PROCEDURE — 1160F RVW MEDS BY RX/DR IN RCRD: CPT | Performed by: FAMILY MEDICINE

## 2019-12-12 PROCEDURE — 1036F TOBACCO NON-USER: CPT | Performed by: FAMILY MEDICINE

## 2019-12-12 NOTE — PATIENT INSTRUCTIONS

## 2019-12-12 NOTE — PROGRESS NOTES
Assessment and Plan:     Problem List Items Addressed This Visit     None      Visit Diagnoses     Medicare annual wellness visit, subsequent    -  Primary    Colon cancer screening        Relevant Orders    Cologuard           Preventive health issues were discussed with patient, and age appropriate screening tests were ordered as noted in patient's After Visit Summary  Personalized health advice and appropriate referrals for health education or preventive services given if needed, as noted in patient's After Visit Summary       History of Present Illness:     Patient presents for Medicare Annual Wellness visit    Patient Care Team:  Jag Giles MD as PCP - General (Family Medicine)  Zoe Segura MD as Consulting Physician (Nephrology)  Sixto Watters DO (Cardiothoracic Surgery)  Lizet Hanna MD (Vascular Surgery)  Elba Beltran PA-C (Vascular Surgery)     Problem List:     Patient Active Problem List   Diagnosis    Polycystic kidney disease    Essential hypertension    Arterial ectasia ([address]    Aortic aneurysm, thoracic ([address] )    Hepatic cyst    Femoral artery aneurysm, bilateral ([address] )    Hx of ischemic vertebrobasilar artery thalamic stroke    Hyperlipidemia, mild    Popliteal artery ectasias, bilateral ([address] )      Past Medical and Surgical History:     Past Medical History:   Diagnosis Date    Arterial ectasia ([address] )     Congenital polycystic kidney     Hepatic cyst     History of endoscopy 04/29/2010    Hypertension     Numbness on right side     Renal cyst     Splenic artery aneurysm ([address] )     Thoracic aneurysm without mention of rupture      Past Surgical History:   Procedure Laterality Date    KNEE SURGERY Bilateral     LITHOTRIPSY  05/12/2010    ROTATOR CUFF REPAIR      SHOULDER SURGERY Bilateral 03/28/1986    contusion     SPLENIC ARTERY EMBOLIZATION        Family History:     Family History   Problem Relation Age of Onset    Aneurysm Brother         Ascending aortic aneurysm    Aneurysm Maternal Aunt     Heart disease Cousin     Heart disease Mother     Emphysema Father     No Known Problems Paternal Grandmother     No Known Problems Paternal Grandfather       Social History:     Social History     Socioeconomic History    Marital status: /Civil Union     Spouse name:  Marie Angulo     Number of children: 2    Years of education: None    Highest education level: None   Occupational History    Occupation: RETIRED   Social Needs    Financial resource strain: None    Food insecurity:     Worry: None     Inability: None    Transportation needs:     Medical: None     Non-medical: None   Tobacco Use    Smoking status: Former Smoker     Packs/day: 2 00     Years: 23 00     Pack years: 46 00     Types: Cigarettes     Last attempt to quit: 10/3/1989     Years since quittin 2    Smokeless tobacco: Never Used   Substance and Sexual Activity    Alcohol use: Yes     Frequency: Monthly or less     Drinks per session: 1 or 2     Binge frequency: Never    Drug use: No    Sexual activity: None   Lifestyle    Physical activity:     Days per week: None     Minutes per session: None    Stress: None   Relationships    Social connections:     Talks on phone: None     Gets together: None     Attends Holiness service: None     Active member of club or organization: None     Attends meetings of clubs or organizations: None     Relationship status: None    Intimate partner violence:     Fear of current or ex partner: None     Emotionally abused: None     Physically abused: None     Forced sexual activity: None   Other Topics Concern    None   Social History Narrative    Exercises regularly        Medications and Allergies:     Current Outpatient Medications   Medication Sig Dispense Refill    Ferrous Gluconate-C-Folic Acid (IRON-C PO) Take 60 mg by mouth daily      lisinopril (ZESTRIL) 5 mg tablet Take 1 tablet (5 mg total) by mouth daily 90 tablet 3    metoprolol succinate (TOPROL-XL) 50 mg 24 hr tablet Take 1 5 tablets (75 mg total) by mouth daily 135 tablet 3    Elastic Bandages & Supports (MEDICAL COMPRESSION STOCKINGS) MISC by Does not apply route daily Knee High 20-30mmHg (Patient not taking: Reported on 12/12/2019) 2 each 4    EPINEPHrine (EPIPEN 2-STEPHANI) 0 3 mg/0 3 mL SOAJ Inject 0 3 mL (0 3 mg total) into a muscle once for 1 dose Then go to to the ER 0 6 mL 0     No current facility-administered medications for this visit  Allergies   Allergen Reactions    Hymenoptera Venom Preparations Anaphylaxis      Immunizations:     Immunization History   Administered Date(s) Administered    Pneumococcal Conjugate 13-Valent 10/03/2019    Tdap 10/03/2019      Health Maintenance:         Topic Date Due    CRC Screening: Colonoscopy  1955    Hepatitis C Screening  Completed     There are no preventive care reminders to display for this patient  Medicare Health Risk Assessment:     /78   Pulse 60   Temp 97 5 °F (36 4 °C)   Resp 12   Ht 5' 8 9" (1 75 m)   Wt 130 kg (287 lb 3 2 oz)   SpO2 98%   BMI 42 54 kg/m²      Les is here for his Subsequent Wellness visit  Health Risk Assessment:   Patient rates overall health as very good  Patient feels that their physical health rating is same  Eyesight was rated as same  Hearing was rated as same  Patient feels that their emotional and mental health rating is much better  Pain experienced in the last 7 days has been a lot  Patient's pain rating has been 5/10  Patient states that he has experienced no weight loss or gain in last 6 months  Depression Screening:   PHQ-2 Score: 0      Fall Risk Screening: In the past year, patient has experienced: no history of falling in past year      Home Safety:  Patient does not have trouble with stairs inside or outside of their home  Patient has working smoke alarms and has working carbon monoxide detector  Home safety hazards include: none       Nutrition: Current diet is Regular  Medications:   Patient is currently taking over-the-counter supplements  OTC medications include: see medication list  Patient is able to manage medications  Activities of Daily Living (ADLs)/Instrumental Activities of Daily Living (IADLs):   Walk and transfer into and out of bed and chair?: Yes  Dress and groom yourself?: Yes    Bathe or shower yourself?: Yes    Feed yourself? Yes  Do your laundry/housekeeping?: Yes  Manage your money, pay your bills and track your expenses?: Yes  Make your own meals?: Yes    Do your own shopping?: Yes    Previous Hospitalizations:   Any hospitalizations or ED visits within the last 12 months?: No      Advance Care Planning:   Living will: Yes    Advanced directive: Yes      Cognitive Screening:   Provider or family/friend/caregiver concerned regarding cognition?: No    PREVENTIVE SCREENINGS      Cardiovascular Screening:    General: Screening Not Indicated and History Lipid Disorder      Diabetes Screening:     General: Screening Current      Colorectal Cancer Screening:     General: Risks and Benefits Discussed    Due for: Cologuard      Prostate Cancer Screening:    General: Screening Current      Osteoporosis Screening:    General: Screening Not Indicated      Abdominal Aortic Aneurysm (AAA) Screening:    Risk factors include: age between 73-67 yo and tobacco use        Lung Cancer Screening:     General: Screening Current      Hepatitis C Screening:    General: Screening Current    Constitutional: Appears well-developed and well-nourished  Head: Normocephalic  Mouth/Throat: Oropharynx is clear and moist   Neck: Neck supple  Cardiovascular: Normal rate, regular rhythm and normal heart sounds  No murmur or bruit  Pulmonary/Chest: Effort normal and breath sounds normal  No wheezes, rales, or rhonchi  Abdominal: Soft  Bowel sounds are normal  There is no tenderness  Musculoskeletal: No lower extremity edema     Lymphadenopathy: No cervical adenopathy  Neurological: Alert and oriented  Skin: Skin is warm and dry  Psychiatric: Normal mood and affect    Behavior is normal  Thought content normal      [address] Ivette Torres MD

## 2019-12-31 ENCOUNTER — TRANSCRIBE ORDERS (OUTPATIENT)
Dept: ADMINISTRATIVE | Facility: HOSPITAL | Age: 64
End: 2019-12-31

## 2019-12-31 ENCOUNTER — APPOINTMENT (OUTPATIENT)
Dept: LAB | Facility: HOSPITAL | Age: 64
End: 2019-12-31
Attending: INTERNAL MEDICINE
Payer: COMMERCIAL

## 2019-12-31 DIAGNOSIS — Q61.3 POLYCYSTIC KIDNEY DISEASE: ICD-10-CM

## 2019-12-31 LAB
ALBUMIN SERPL BCP-MCNC: 3.5 G/DL (ref 3.5–5)
ALP SERPL-CCNC: 40 U/L (ref 46–116)
ALT SERPL W P-5'-P-CCNC: 34 U/L (ref 12–78)
ANION GAP SERPL CALCULATED.3IONS-SCNC: 8 MMOL/L (ref 4–13)
AST SERPL W P-5'-P-CCNC: 26 U/L (ref 5–45)
BILIRUB SERPL-MCNC: 0.5 MG/DL (ref 0.2–1)
BUN SERPL-MCNC: 25 MG/DL (ref 5–25)
CALCIUM SERPL-MCNC: 8.8 MG/DL (ref 8.3–10.1)
CHLORIDE SERPL-SCNC: 106 MMOL/L (ref 100–108)
CO2 SERPL-SCNC: 26 MMOL/L (ref 21–32)
CREAT SERPL-MCNC: 0.99 MG/DL (ref 0.6–1.3)
ERYTHROCYTE [DISTWIDTH] IN BLOOD BY AUTOMATED COUNT: 12.9 % (ref 11.6–15.1)
GFR SERPL CREATININE-BSD FRML MDRD: 79 ML/MIN/1.73SQ M
GLUCOSE P FAST SERPL-MCNC: 109 MG/DL (ref 65–99)
HCT VFR BLD AUTO: 44.5 % (ref 36.5–49.3)
HGB BLD-MCNC: 14.9 G/DL (ref 12–17)
MCH RBC QN AUTO: 29 PG (ref 26.8–34.3)
MCHC RBC AUTO-ENTMCNC: 33.5 G/DL (ref 31.4–37.4)
MCV RBC AUTO: 87 FL (ref 82–98)
PLATELET # BLD AUTO: 153 THOUSANDS/UL (ref 149–390)
PMV BLD AUTO: 9.6 FL (ref 8.9–12.7)
POTASSIUM SERPL-SCNC: 4.2 MMOL/L (ref 3.5–5.3)
PROT SERPL-MCNC: 6.9 G/DL (ref 6.4–8.2)
RBC # BLD AUTO: 5.13 MILLION/UL (ref 3.88–5.62)
SODIUM SERPL-SCNC: 140 MMOL/L (ref 136–145)
WBC # BLD AUTO: 4.93 THOUSAND/UL (ref 4.31–10.16)

## 2019-12-31 PROCEDURE — 80053 COMPREHEN METABOLIC PANEL: CPT

## 2019-12-31 PROCEDURE — 36415 COLL VENOUS BLD VENIPUNCTURE: CPT

## 2019-12-31 PROCEDURE — 85027 COMPLETE CBC AUTOMATED: CPT

## 2020-01-14 ENCOUNTER — TELEPHONE (OUTPATIENT)
Dept: NEPHROLOGY | Facility: CLINIC | Age: 65
End: 2020-01-14

## 2020-01-14 NOTE — TELEPHONE ENCOUNTER
I called patient to see if he would be able to come in on 1/15 in the afternoon because we had an opening  If patient calls back please tell him to just come in at his original appointment time which is 11:30

## 2020-01-15 ENCOUNTER — OFFICE VISIT (OUTPATIENT)
Dept: NEPHROLOGY | Facility: CLINIC | Age: 65
End: 2020-01-15
Payer: COMMERCIAL

## 2020-01-15 VITALS
BODY MASS INDEX: 43.98 KG/M2 | DIASTOLIC BLOOD PRESSURE: 78 MMHG | HEIGHT: 68 IN | WEIGHT: 290.2 LBS | SYSTOLIC BLOOD PRESSURE: 140 MMHG

## 2020-01-15 DIAGNOSIS — Z86.73 HX OF ISCHEMIC VERTEBROBASILAR ARTERY THALAMIC STROKE: ICD-10-CM

## 2020-01-15 DIAGNOSIS — Q61.3 POLYCYSTIC KIDNEY DISEASE: ICD-10-CM

## 2020-01-15 DIAGNOSIS — I71.2 THORACIC AORTIC ANEURYSM WITHOUT RUPTURE (HCC): ICD-10-CM

## 2020-01-15 DIAGNOSIS — I10 ESSENTIAL HYPERTENSION: Primary | ICD-10-CM

## 2020-01-15 PROCEDURE — 99214 OFFICE O/P EST MOD 30 MIN: CPT | Performed by: INTERNAL MEDICINE

## 2020-01-15 NOTE — PATIENT INSTRUCTIONS
1  )  Low 2 g sodium diet    2 )  Monitor weights at home    3 )  Avoid NSAIDs (ibuprofen, Motrin, Advil, Aleve, naproxen)    4 )  Monitor blood pressure at home, call if blood pressure greater than 150/90 persistently    5 ) Cut back on salt

## 2020-01-15 NOTE — PROGRESS NOTES
NEPHROLOGY OFFICE VISIT   Umair Goddard 75 y o  male MRN: [id number]  1/15/2020    Reason for Visit:  Polycystic kidney disease    ASSESSMENT and PLAN:    I had the pleasure of seeing Chica Painting today in the renal clinic for the continued management of polycystic kidney disease  Since our last visit, there has been no ER visits or hospitilizations  He currently has no complaints at this time and is feeling well  Patient denies any chest pain, shortness of breath and swelling  The last blood work was done on 12/31/2019, which we have reviewed together  Blood pressures have been pretty well controlled at target at home  He did lose a good amount of weight but then regained it back  Has not been salt restricting his diet  1 ) Polycystic kidney disease  - Strong family history with evidence of cysts on both kidneys that are multiple and cyst on the liver  - CTA of the head and neck was negative for aneurysm  No repeat is needed  - Creatinine baseline is around 1 mg/dL  - Urine analysis is bland with no blood and no protein  -urine protein creatinine ratio is 0 07  -renal function is stable, creatinine 0 999 mg/dL  -reports no hematuria, no flank pain  -educated to get children checked with a renal ultrasound as well as blood work     2 ) Hypertension  - Has a history of a 5 cm thoracic abdominal aortic aneurysm and ideally would aim to keep his systolic blood pressure less than 120 as tolerated  -target blood pressure less than 120/80  - continue metoprolol XL 75 mg daily  -Continue lisinopril 5 mg daily  -blood pressure is under excellent control at home slightly elevated in the office today  - Educated on sodium restriction diet     3 ) Family history of strokes  - CTA was negative for aneurysm  - Old left thalamic infarct        PATIENT INSTRUCTIONS:    Patient Instructions   1 )  Low 2 g sodium diet    2 )  Monitor weights at home    3 )  Avoid NSAIDs (ibuprofen, Motrin, Advil, Aleve, naproxen)    4 )  Monitor blood pressure at home, call if blood pressure greater than 150/90 persistently    5 ) Cut back on salt          Orders Placed This Encounter   Procedures    Comprehensive metabolic panel     Standing Status:   Future     Standing Expiration Date:   1/15/2021    CBC     Standing Status:   Future     Standing Expiration Date:   1/15/2021    Protein / creatinine ratio, urine     Standing Status:   Future     Standing Expiration Date:   1/15/2021    Iron Saturation %     Standing Status:   Future     Standing Expiration Date:   1/15/2021    Ferritin     Standing Status:   Future     Standing Expiration Date:   1/15/2021       OBJECTIVE:  Current Weight: Weight - Scale: 132 kg (290 lb 3 2 oz)  Vitals:    01/15/20 1112 01/15/20 1130   BP:  140/78   Weight: 132 kg (290 lb 3 2 oz)    Height: 5' 8" (1 727 m)     Body mass index is 44 12 kg/m²  REVIEW OF SYSTEMS:    Review of Systems   Constitutional: Negative for activity change and fever  Respiratory: Negative for cough, chest tightness, shortness of breath and wheezing  Cardiovascular: Negative for chest pain and leg swelling  Gastrointestinal: Negative for abdominal pain, diarrhea, nausea and vomiting  Endocrine: Negative for polyuria  Genitourinary: Negative for difficulty urinating, dysuria, flank pain, frequency and urgency  Skin: Negative for rash  Neurological: Negative for dizziness, syncope, light-headedness and headaches  PHYSICAL EXAM:      Physical Exam   Constitutional: He is oriented to person, place, and time  He appears well-developed and well-nourished  No distress  HENT:   Head: Normocephalic and atraumatic  Eyes: Pupils are equal, round, and reactive to light  No scleral icterus  Neck: Normal range of motion  Neck supple  Cardiovascular: Normal rate, regular rhythm and normal heart sounds  Exam reveals no gallop and no friction rub  No murmur heard    Pulmonary/Chest: Effort normal and breath sounds normal  No respiratory distress  He has no wheezes  He has no rales  He exhibits no tenderness  Abdominal: Soft  Bowel sounds are normal  He exhibits no distension  There is no tenderness  There is no rebound  Musculoskeletal: Normal range of motion  He exhibits no edema  Neurological: He is alert and oriented to person, place, and time  Skin: No rash noted  He is not diaphoretic  Psychiatric: He has a normal mood and affect         Medications:    Current Outpatient Medications:     Ferrous Gluconate-C-Folic Acid (IRON-C PO), Take 60 mg by mouth daily, Disp: , Rfl:     lisinopril (ZESTRIL) 5 mg tablet, Take 1 tablet (5 mg total) by mouth daily, Disp: 90 tablet, Rfl: 3    metoprolol succinate (TOPROL-XL) 50 mg 24 hr tablet, Take 1 5 tablets (75 mg total) by mouth daily, Disp: 135 tablet, Rfl: 3    Elastic Bandages & Supports (MEDICAL COMPRESSION STOCKINGS) MISC, by Does not apply route daily Knee High 20-30mmHg (Patient not taking: Reported on 12/12/2019), Disp: 2 each, Rfl: 4    EPINEPHrine (EPIPEN 2-STEPHANI) 0 3 mg/0 3 mL SOAJ, Inject 0 3 mL (0 3 mg total) into a muscle once for 1 dose Then go to to the ER, Disp: 0 6 mL, Rfl: 0    Laboratory Results:        Invalid input(s): ALBUMIN    Results for orders placed or performed in visit on 12/31/19   Comprehensive metabolic panel   Result Value Ref Range    Sodium 140 136 - 145 mmol/L    Potassium 4 2 3 5 - 5 3 mmol/L    Chloride 106 100 - 108 mmol/L    CO2 26 21 - 32 mmol/L    ANION GAP 8 4 - 13 mmol/L    BUN 25 5 - 25 mg/dL    Creatinine 0 99 0 60 - 1 30 mg/dL    Glucose, Fasting 109 (H) 65 - 99 mg/dL    Calcium 8 8 8 3 - 10 1 mg/dL    AST 26 5 - 45 U/L    ALT 34 12 - 78 U/L    Alkaline Phosphatase 40 (L) 46 - 116 U/L    Total Protein 6 9 6 4 - 8 2 g/dL    Albumin 3 5 3 5 - 5 0 g/dL    Total Bilirubin 0 50 0 20 - 1 00 mg/dL    eGFR 79 ml/min/1 73sq m   CBC   Result Value Ref Range    WBC 4 93 4 31 - 10 16 Thousand/uL    RBC 5 13 3 88 - 5 62 Million/uL    Hemoglobin 14 9 12 0 - 17 0 g/dL    Hematocrit 44 5 36 5 - 49 3 %    MCV 87 82 - 98 fL    MCH 29 0 26 8 - 34 3 pg    MCHC 33 5 31 4 - 37 4 g/dL    RDW 12 9 11 6 - 15 1 %    Platelets 576 724 - 201 Thousands/uL    MPV 9 6 8 9 - 12 7 fL

## 2020-04-22 DIAGNOSIS — I10 ESSENTIAL HYPERTENSION: ICD-10-CM

## 2020-04-23 RX ORDER — METOPROLOL SUCCINATE 50 MG/1
75 TABLET, EXTENDED RELEASE ORAL DAILY
Qty: 135 TABLET | Refills: 0 | Status: SHIPPED | OUTPATIENT
Start: 2020-04-23 | End: 2020-08-06 | Stop reason: SDUPTHER

## 2020-06-12 ENCOUNTER — TELEPHONE (OUTPATIENT)
Dept: FAMILY MEDICINE CLINIC | Facility: CLINIC | Age: 65
End: 2020-06-12

## 2020-06-27 ENCOUNTER — APPOINTMENT (EMERGENCY)
Dept: RADIOLOGY | Facility: HOSPITAL | Age: 65
End: 2020-06-27
Payer: MEDICARE

## 2020-06-27 ENCOUNTER — HOSPITAL ENCOUNTER (EMERGENCY)
Facility: HOSPITAL | Age: 65
Discharge: HOME | End: 2020-06-27
Attending: EMERGENCY MEDICINE
Payer: MEDICARE

## 2020-06-27 VITALS
WEIGHT: 292 LBS | TEMPERATURE: 97.4 F | HEART RATE: 66 BPM | BODY MASS INDEX: 40.88 KG/M2 | HEIGHT: 71 IN | SYSTOLIC BLOOD PRESSURE: 129 MMHG | RESPIRATION RATE: 18 BRPM | OXYGEN SATURATION: 95 % | DIASTOLIC BLOOD PRESSURE: 86 MMHG

## 2020-06-27 DIAGNOSIS — M25.562 ACUTE PAIN OF LEFT KNEE: Primary | ICD-10-CM

## 2020-06-27 PROCEDURE — 99283 EMERGENCY DEPT VISIT LOW MDM: CPT

## 2020-06-27 PROCEDURE — 73700 CT LOWER EXTREMITY W/O DYE: CPT

## 2020-06-27 PROCEDURE — 99284 EMERGENCY DEPT VISIT MOD MDM: CPT | Performed by: EMERGENCY MEDICINE

## 2020-06-28 ENCOUNTER — TELEPHONE (OUTPATIENT)
Dept: OTHER | Facility: OTHER | Age: 65
End: 2020-06-28

## 2020-06-29 ENCOUNTER — LAB (OUTPATIENT)
Dept: LAB | Facility: HOSPITAL | Age: 65
End: 2020-06-29
Attending: ORTHOPAEDIC SURGERY
Payer: MEDICARE

## 2020-06-29 ENCOUNTER — TRANSCRIBE ORDERS (OUTPATIENT)
Dept: ADMINISTRATIVE | Facility: HOSPITAL | Age: 65
End: 2020-06-29

## 2020-06-29 ENCOUNTER — TELEPHONE (OUTPATIENT)
Dept: FAMILY MEDICINE CLINIC | Facility: CLINIC | Age: 65
End: 2020-06-29

## 2020-06-29 ENCOUNTER — OFFICE VISIT (OUTPATIENT)
Dept: OBGYN CLINIC | Facility: CLINIC | Age: 65
End: 2020-06-29
Payer: MEDICARE

## 2020-06-29 VITALS
DIASTOLIC BLOOD PRESSURE: 77 MMHG | SYSTOLIC BLOOD PRESSURE: 123 MMHG | WEIGHT: 292 LBS | TEMPERATURE: 98.9 F | HEIGHT: 71 IN | BODY MASS INDEX: 40.88 KG/M2 | HEART RATE: 81 BPM

## 2020-06-29 DIAGNOSIS — S76.111A RUPTURE OF RIGHT QUADRICEPS TENDON, INITIAL ENCOUNTER: ICD-10-CM

## 2020-06-29 DIAGNOSIS — Z01.812 PRE-PROCEDURAL LABORATORY EXAMINATION: ICD-10-CM

## 2020-06-29 DIAGNOSIS — Z11.59 SCREENING FOR VIRAL DISEASE: ICD-10-CM

## 2020-06-29 DIAGNOSIS — S76.111A QUADRICEPS TENDON RUPTURE, RIGHT, INITIAL ENCOUNTER: Primary | ICD-10-CM

## 2020-06-29 LAB
ANION GAP SERPL CALCULATED.3IONS-SCNC: 9 MMOL/L (ref 4–13)
APTT PPP: 30 SECONDS (ref 23–37)
ATRIAL RATE: 62 BPM
BASOPHILS # BLD AUTO: 0.03 THOUSANDS/ΜL (ref 0–0.1)
BASOPHILS NFR BLD AUTO: 0 % (ref 0–1)
BUN SERPL-MCNC: 22 MG/DL (ref 5–25)
CALCIUM SERPL-MCNC: 9.2 MG/DL (ref 8.3–10.1)
CHLORIDE SERPL-SCNC: 104 MMOL/L (ref 100–108)
CO2 SERPL-SCNC: 25 MMOL/L (ref 21–32)
CREAT SERPL-MCNC: 1.09 MG/DL (ref 0.6–1.3)
EOSINOPHIL # BLD AUTO: 0.03 THOUSAND/ΜL (ref 0–0.61)
EOSINOPHIL NFR BLD AUTO: 0 % (ref 0–6)
ERYTHROCYTE [DISTWIDTH] IN BLOOD BY AUTOMATED COUNT: 13.7 % (ref 11.6–15.1)
GFR SERPL CREATININE-BSD FRML MDRD: 70 ML/MIN/1.73SQ M
GLUCOSE P FAST SERPL-MCNC: 103 MG/DL (ref 65–99)
HCT VFR BLD AUTO: 46.8 % (ref 36.5–49.3)
HGB BLD-MCNC: 15.2 G/DL (ref 12–17)
IMM GRANULOCYTES # BLD AUTO: 0.07 THOUSAND/UL (ref 0–0.2)
IMM GRANULOCYTES NFR BLD AUTO: 1 % (ref 0–2)
INR PPP: 1.07 (ref 0.84–1.19)
LYMPHOCYTES # BLD AUTO: 0.76 THOUSANDS/ΜL (ref 0.6–4.47)
LYMPHOCYTES NFR BLD AUTO: 8 % (ref 14–44)
MCH RBC QN AUTO: 27.3 PG (ref 26.8–34.3)
MCHC RBC AUTO-ENTMCNC: 32.5 G/DL (ref 31.4–37.4)
MCV RBC AUTO: 84 FL (ref 82–98)
MONOCYTES # BLD AUTO: 1.25 THOUSAND/ΜL (ref 0.17–1.22)
MONOCYTES NFR BLD AUTO: 13 % (ref 4–12)
NEUTROPHILS # BLD AUTO: 7.53 THOUSANDS/ΜL (ref 1.85–7.62)
NEUTS SEG NFR BLD AUTO: 78 % (ref 43–75)
NRBC BLD AUTO-RTO: 0 /100 WBCS
P AXIS: 22 DEGREES
PLATELET # BLD AUTO: 167 THOUSANDS/UL (ref 149–390)
PMV BLD AUTO: 10.1 FL (ref 8.9–12.7)
POTASSIUM SERPL-SCNC: 4.6 MMOL/L (ref 3.5–5.3)
PR INTERVAL: 164 MS
PROTHROMBIN TIME: 14.2 SECONDS (ref 11.6–14.5)
QRS AXIS: -26 DEGREES
QRSD INTERVAL: 86 MS
QT INTERVAL: 386 MS
QTC INTERVAL: 391 MS
RBC # BLD AUTO: 5.56 MILLION/UL (ref 3.88–5.62)
SODIUM SERPL-SCNC: 138 MMOL/L (ref 136–145)
T WAVE AXIS: -8 DEGREES
VENTRICULAR RATE: 62 BPM
WBC # BLD AUTO: 9.67 THOUSAND/UL (ref 4.31–10.16)

## 2020-06-29 PROCEDURE — 3008F BODY MASS INDEX DOCD: CPT | Performed by: ORTHOPAEDIC SURGERY

## 2020-06-29 PROCEDURE — 1160F RVW MEDS BY RX/DR IN RCRD: CPT | Performed by: ORTHOPAEDIC SURGERY

## 2020-06-29 PROCEDURE — 85730 THROMBOPLASTIN TIME PARTIAL: CPT

## 2020-06-29 PROCEDURE — 3078F DIAST BP <80 MM HG: CPT | Performed by: ORTHOPAEDIC SURGERY

## 2020-06-29 PROCEDURE — 3074F SYST BP LT 130 MM HG: CPT | Performed by: ORTHOPAEDIC SURGERY

## 2020-06-29 PROCEDURE — 99204 OFFICE O/P NEW MOD 45 MIN: CPT | Performed by: ORTHOPAEDIC SURGERY

## 2020-06-29 PROCEDURE — 80048 BASIC METABOLIC PNL TOTAL CA: CPT

## 2020-06-29 PROCEDURE — 36415 COLL VENOUS BLD VENIPUNCTURE: CPT

## 2020-06-29 PROCEDURE — 85610 PROTHROMBIN TIME: CPT

## 2020-06-29 PROCEDURE — 4040F PNEUMOC VAC/ADMIN/RCVD: CPT | Performed by: ORTHOPAEDIC SURGERY

## 2020-06-29 PROCEDURE — 85025 COMPLETE CBC W/AUTO DIFF WBC: CPT

## 2020-06-29 PROCEDURE — 93005 ELECTROCARDIOGRAM TRACING: CPT

## 2020-06-29 PROCEDURE — 93010 ELECTROCARDIOGRAM REPORT: CPT | Performed by: INTERNAL MEDICINE

## 2020-06-29 PROCEDURE — 1036F TOBACCO NON-USER: CPT | Performed by: ORTHOPAEDIC SURGERY

## 2020-06-29 NOTE — H&P (VIEW-ONLY)
Assessment/Plan:  1  Quadriceps tendon rupture, right, initial encounter       Though CT scan does not give great visualization of the quadriceps tendon, based on physical examination the patient does to have a complete or near complete quadriceps tendon tear which requires surgical fixation  As the patient does have a complex medical history he will need multiple clearances prior to this procedure, which we hope to perform in 3 days  I did speak directly with the office of his cardiothoracic surgeon  They would like a repeat CT of the chest and then may be able to give him a verbal clearance without seeing him in the office  We will help his schedule this test today  They will also let us know if we can place him on post-operative anticoagulation  He will also need to see his PCP for medical clearance  He will get COVID-19 testing on arrival the day of his surgery  We discussed the procedure and risks at length, including but not limited to, infection, bleeding, wound issues, nerve injury, blood clot, stiffness, failure of procedure, and need for additional surgery  We will see the patient back at the time of surgery pending medical clearances  Subjective:   Murphy Brewer is a 77 y o  male who presents today for evaluation of his right knee  He sustained a fall down steps on Saturday injuring his right knee  He did go to the ED and had a CT of the knee which showed a partial quadriceps tendon tear as well as tricompartmental arthritis with intra-articular loose bodies  We are able to view these images today  He can not have an MRI due to a clip on his spleen  He has been ambulating with a knee immobilizer  He notes diffuse swelling and bruising and the knee  He notes pain about the anterior aspect of the knee  He has no sensation to light touch of the entire left lower extremity due to a stroke he sustained at the age of 21   His motor function was not affected by this stroke though and he was ambulatory prior to this injury  He does have a complex medical history including polycystic kidney disease, and aortic arch aneurysm, and multiple aneurysms in this legs  The patient did have a left knee quadriceps tendon repair 12 years ago  He did also have prior meniscectomy of the right knee many years ago  Review of Systems   Constitutional: Negative  Negative for chills and fever  HENT: Negative  Negative for ear pain and sore throat  Eyes: Negative  Negative for pain and redness  Respiratory: Negative  Negative for shortness of breath and wheezing  Cardiovascular: Negative for chest pain and palpitations  Gastrointestinal: Negative  Negative for abdominal pain and blood in stool  Endocrine: Negative  Negative for polydipsia and polyuria  Genitourinary: Negative  Negative for difficulty urinating and dysuria  Musculoskeletal:        As noted in HPI   Skin: Negative  Negative for pallor and rash  Neurological: Positive for numbness  Negative for dizziness  Hematological: Negative  Negative for adenopathy  Does not bruise/bleed easily  Psychiatric/Behavioral: Negative  Negative for confusion and suicidal ideas           Past Medical History:   Diagnosis Date    Arterial ectasia (HCC)     Congenital polycystic kidney     Hepatic cyst     History of endoscopy 04/29/2010    Hypertension     Numbness on right side     Renal cyst     Splenic artery aneurysm ([address] )     Thoracic aneurysm without mention of rupture        Past Surgical History:   Procedure Laterality Date    KNEE SURGERY Bilateral     LITHOTRIPSY  05/12/2010    ROTATOR CUFF REPAIR      SHOULDER SURGERY Bilateral 03/28/1986    contusion     SPLENIC ARTERY EMBOLIZATION         Family History   Problem Relation Age of Onset    Aneurysm Brother         Ascending aortic aneurysm    Aneurysm Maternal Aunt     Heart disease Cousin     Heart disease Mother     Emphysema Father     No Known Problems Paternal Grandmother     No Known Problems Paternal Grandfather        Social History     Occupational History    Occupation: RETIRED   Tobacco Use    Smoking status: Former Smoker     Packs/day: 2 00     Years: 23 00     Pack years: 46 00     Types: Cigarettes     Last attempt to quit: 10/3/1989     Years since quittin 7    Smokeless tobacco: Never Used   Substance and Sexual Activity    Alcohol use: Yes     Frequency: Monthly or less     Drinks per session: 1 or 2     Binge frequency: Never    Drug use: No    Sexual activity: Not on file         Current Outpatient Medications:     lisinopril (ZESTRIL) 5 mg tablet, Take 1 tablet (5 mg total) by mouth daily, Disp: 90 tablet, Rfl: 3    metoprolol succinate (TOPROL-XL) 50 mg 24 hr tablet, Take 1 5 tablets (75 mg total) by mouth daily, Disp: 135 tablet, Rfl: 0    Elastic Bandages & Supports (MEDICAL COMPRESSION STOCKINGS) MISC, by Does not apply route daily Knee High 20-30mmHg (Patient not taking: Reported on 2019), Disp: 2 each, Rfl: 4    EPINEPHrine (EPIPEN 2-STEPHANI) 0 3 mg/0 3 mL SOAJ, Inject 0 3 mL (0 3 mg total) into a muscle once for 1 dose Then go to to the ER, Disp: 0 6 mL, Rfl: 0    Ferrous Gluconate-C-Folic Acid (IRON-C PO), Take 60 mg by mouth daily, Disp: , Rfl:     Allergies   Allergen Reactions    Hymenoptera Venom Preparations Anaphylaxis       Objective:  Vitals:    20 0938   BP: 123/77   Pulse: 81   Temp: 98 9 °F (37 2 °C)       Ortho Exam    Physical Exam   Constitutional: He is oriented to person, place, and time  He appears well-developed and well-nourished  No distress  HENT:   Head: Normocephalic and atraumatic  Eyes: Conjunctivae and EOM are normal  No scleral icterus  Neck: No JVD present  Cardiovascular: Normal rate, normal heart sounds and intact distal pulses  Pulmonary/Chest: Effort normal and breath sounds normal  No respiratory distress  Neurological: He is alert and oriented to person, place, and time  Coordination normal    Skin: Skin is warm  Psychiatric: He has a normal mood and affect  Right knee: Diffuse swelling anterior knee with ecchymosis  Palpable defect of the quadriceps tendon  Patient unable to do a straight leg raise  No sensation to light touch about the right lower extremity  I have personally reviewed pertinent films in PACS and my interpretation is as follows:  CT right knee: patella baha  Tricompartmental arthritis with loose bodies

## 2020-06-30 ENCOUNTER — OFFICE VISIT (OUTPATIENT)
Dept: FAMILY MEDICINE CLINIC | Facility: CLINIC | Age: 65
End: 2020-06-30
Payer: MEDICARE

## 2020-06-30 ENCOUNTER — HOSPITAL ENCOUNTER (OUTPATIENT)
Dept: RADIOLOGY | Facility: HOSPITAL | Age: 65
Discharge: HOME | End: 2020-06-30
Payer: MEDICARE

## 2020-06-30 VITALS
WEIGHT: 291.4 LBS | TEMPERATURE: 97.8 F | RESPIRATION RATE: 16 BRPM | HEIGHT: 71 IN | HEART RATE: 61 BPM | OXYGEN SATURATION: 94 % | SYSTOLIC BLOOD PRESSURE: 118 MMHG | DIASTOLIC BLOOD PRESSURE: 66 MMHG | BODY MASS INDEX: 40.8 KG/M2

## 2020-06-30 DIAGNOSIS — Z01.818 PRE-OPERATIVE EXAMINATION: Primary | ICD-10-CM

## 2020-06-30 DIAGNOSIS — I72.4 POPLITEAL ARTERY ANEURYSM, BILATERAL (HCC): ICD-10-CM

## 2020-06-30 DIAGNOSIS — Q61.3 POLYCYSTIC KIDNEY DISEASE: ICD-10-CM

## 2020-06-30 DIAGNOSIS — I71.2 THORACIC AORTIC ANEURYSM WITHOUT RUPTURE (HCC): ICD-10-CM

## 2020-06-30 DIAGNOSIS — I10 ESSENTIAL HYPERTENSION: ICD-10-CM

## 2020-06-30 DIAGNOSIS — I72.4 FEMORAL ARTERY ANEURYSM, BILATERAL (HCC): ICD-10-CM

## 2020-06-30 DIAGNOSIS — S76.111D RUPTURE OF RIGHT QUADRICEPS TENDON, SUBSEQUENT ENCOUNTER: ICD-10-CM

## 2020-06-30 PROCEDURE — 4040F PNEUMOC VAC/ADMIN/RCVD: CPT | Performed by: FAMILY MEDICINE

## 2020-06-30 PROCEDURE — 3074F SYST BP LT 130 MM HG: CPT | Performed by: FAMILY MEDICINE

## 2020-06-30 PROCEDURE — 1160F RVW MEDS BY RX/DR IN RCRD: CPT | Performed by: FAMILY MEDICINE

## 2020-06-30 PROCEDURE — 3078F DIAST BP <80 MM HG: CPT | Performed by: FAMILY MEDICINE

## 2020-06-30 PROCEDURE — 1036F TOBACCO NON-USER: CPT | Performed by: FAMILY MEDICINE

## 2020-06-30 PROCEDURE — 3008F BODY MASS INDEX DOCD: CPT | Performed by: FAMILY MEDICINE

## 2020-06-30 PROCEDURE — 71250 CT THORAX DX C-: CPT

## 2020-06-30 PROCEDURE — 99214 OFFICE O/P EST MOD 30 MIN: CPT | Performed by: FAMILY MEDICINE

## 2020-06-30 NOTE — PROGRESS NOTES
Presurgical Evaluation    Subjective:      Patient ID: Mesha Starks is a 77 y o  male  Chief Complaint   Patient presents with   [facility] Pre-op Exam     Thursday right knee        HPI    Mr Randi Herron is here for preoperative medical assessment prior to his upcoming surgery on Thursday this week with Dr Zoe Chopra for repair of his R quadriceps tendon which he injured after falling from pull-down attic stairs  He has been in his usual state of health until his fall  He notes that his CT surgeon will be writing a letter in regards to his ascending aortic aneurysm  Mr Randi Herron has multiple aneurysms which are being monitored which also include popliteal ectasia and bilateral femoral aneurysms (he is s/p repair of splenic artery aneurysm )    He notes that he is using tylenol for pain  He has significant pain but prefers to avoid pain medication when possible and declines my offer of pain medication at present  He says he will take pain medication post-op if needed  The following portions of the patient's history were reviewed and updated as appropriate: allergies, current medications, past family history, past medical history, past social history, past surgical history and problem list     Procedure date: July 2, 2020    Surgeon:  Dr Jyoti Mancilla procedure:  Repair R quadriceps tendon  Diagnosis for procedure:  R quadriceps tendon tear    Prior anesthesia: Yes   No complications from anesthesia aside from nausea (he will let anesthesia dept know)    CAD History: None, however, multiple aneurysms (thoracic aortic, splenic, popliteal ectasias, femoral artery b/l aneurysms)   NOTE: Patient should see Cardiology if time available before surgery, and if appropriate      Pulmonary History: None    Renal history: Polycystic kidney dz, CKD    Diabetes History:  None     Neurological History: CVA-remote L thalamic    On Immunosuppressant meds/biologics: No      Review of Systems    No fever, chills, congestion, cp, sob, nausea, vomiting, diarrhea, constipation, blood in stool, urinary concern, mood changes  Rest of ROS neg except as above  Current Outpatient Medications   Medication Sig Dispense Refill    Elastic Bandages & Supports (MEDICAL COMPRESSION STOCKINGS) MISC by Does not apply route daily Knee High 20-30mmHg 2 each 4    EPINEPHrine (EPIPEN 2-STEPHANI) 0 3 mg/0 3 mL SOAJ Inject 0 3 mL (0 3 mg total) into a muscle once for 1 dose Then go to to the ER 0 6 mL 0    lisinopril (ZESTRIL) 5 mg tablet Take 1 tablet (5 mg total) by mouth daily 90 tablet 3    metoprolol succinate (TOPROL-XL) 50 mg 24 hr tablet Take 1 5 tablets (75 mg total) by mouth daily 135 tablet 0     No current facility-administered medications for this visit          Allergies on file:   Hymenoptera venom preparations    Patient Active Problem List   Diagnosis    Polycystic kidney disease    Essential hypertension    [facility] [address] [facility]    Aortic aneurysm, thoracic (HCC)    Hepatic cyst    Femoral artery aneurysm, bilateral (HCC)    Hx of ischemic vertebrobasilar artery thalamic stroke    Hyperlipidemia, mild    Popliteal artery ectasias, bilateral ([facility] [address] [facility]        Past Medical History:   Diagnosis Date    Arterial ectasia (HCC)     Arthritis unknown    Chronic kidney disease 2015    polycystic    Congenital polycystic kidney     Hepatic cyst     History of endoscopy 04/29/2010    Hypertension     Numbness on right side     Obesity     Renal cyst     Splenic artery aneurysm ([facility] [facility]     Stroke [facility]) 1972    Thoracic aneurysm without mention of rupture        Past Surgical History:   Procedure Laterality Date    EYE SURGERY  2018    KNEE SURGERY Bilateral     LITHOTRIPSY  05/12/2010    ROTATOR CUFF REPAIR      SHOULDER SURGERY Bilateral 03/28/1986    contusion     SPLENIC ARTERY EMBOLIZATION         Family History   Problem Relation Age of Onset    Aneurysm Brother         Ascending aortic aneurysm    Aneurysm Maternal Aunt     Early death Maternal Aunt         Brain Aneurysm at 43    Heart disease Cousin     Heart disease Mother     Early death Mother         Heart Failure at 52    Emphysema Father     Alcohol abuse Father     No Known Problems Paternal Grandmother     No Known Problems Paternal Grandfather        Social History     Tobacco Use    Smoking status: Former Smoker     Packs/day: 2 00     Years: 23 00     Pack years: 46 00     Types: Cigarettes     Last attempt to quit: 10/3/1989     Years since quittin 7    Smokeless tobacco: Never Used   Substance Use Topics    Alcohol use: Yes     Frequency: Monthly or less     Drinks per session: 1 or 2     Binge frequency: Never     Comment: 1 to 2 per month    Drug use: No       Objective:    Vitals:    20 0907   BP: 118/66   Pulse: 61   Resp: 16   Temp: 97 8 °F (36 6 °C)   TempSrc: Tympanic   SpO2: 94%   Weight: 132 kg (291 lb 6 4 oz)   Height: 5' 11" (1 803 m)        Physical Exam   Constitutional: He is oriented to person, place, and time  He appears well-developed and well-nourished  HENT:   Head: Normocephalic and atraumatic  Cardiovascular: Normal rate, regular rhythm and normal heart sounds  Pulmonary/Chest: Effort normal and breath sounds normal  He has no wheezes  He has no rales  Abdominal:   Seated- soft, nontender   Musculoskeletal:   RLE- immobilizer, ecchymoses evident  RLE no edema   Lymphadenopathy:     He has no cervical adenopathy  Neurological: He is alert and oriented to person, place, and time  Psychiatric: He has a normal mood and affect  His behavior is normal    Nursing note and vitals reviewed          Preop labs/testing available and reviewed: yes    eGFR   Date Value Ref Range Status   2020 70 ml/min/1 73sq m Final     WBC   Date Value Ref Range Status   2020 9 67 4 31 - 10 16 Thousand/uL Final     INR   Date Value Ref Range Status   2020 1 07 0 84 - 1 19 Final       EKG yes- NSR with sinus arrythmia    Echo no (last in )      Functional capacity: Shovel snow                          6 Mets   Pick the highest level patient can comfortably perform   4 mets or greater for surgery    RCRI  High Risk surgery? 1 Point  CAD History:         0 Point   MI; Positive Stress Test; CP due to Mi;  Nitrate Usage to control Angina; Pathologic Q wave on EKG  CHF Active:         0 Point   Pulm Edema; Paroxysmal Nocturnal Dyspnea;  Bibasilar Rales (crackles);S3; CHF on CXR  Cerebrovascular Disease (TIA or CVA):     1 Point  DM on Insulin:       0 Point  Serum Creat >2 0 mg/dl:       1 Point          Total Points: 2     Scorin: Class I, Very Low Risk (0 4%)     1: Class II, Low risk (0 9%)     2: Class III Moderate (6 6%)     3: Class IV High (>11%)      LISETTE Risk:  GFR:   eGFR   Date Value Ref Range Status   2020 70 ml/min/1 73sq m Final               Assessment/Plan:    Patient is medically optimized (cleared) for the planned procedure  Further testing/evaluation is not required  (preop note in chart from ct surgery)    Postop concerns: yes- BP management perioperatively given pt's known history of aneurysms  Other Visit Diagnoses     Pre-operative examination    -  Primary as above    Rupture of right quadriceps tendon, subsequent encounter       For repair     Problem List Items Addressed This Visit        Cardiovascular and Mediastinum    Essential hypertension     Has been stable on metoprolol and lisinopril      Monitor bp perioperatively         Femoral artery aneurysm, bilateral (HCC)    Popliteal artery ectasias, bilateral (HCC)       Genitourinary    Polycystic kidney disease     Please be aware of polycystic kidney dz history  Renal function has been normal   Avoid nsaids  Goal BP <120/80 per nephrology

## 2020-07-01 ENCOUNTER — DOCUMENTATION (OUTPATIENT)
Dept: CARDIAC SURGERY | Facility: CLINIC | Age: 65
End: 2020-07-01

## 2020-07-01 ENCOUNTER — ANESTHESIA EVENT (OUTPATIENT)
Dept: PERIOP | Facility: HOSPITAL | Age: 65
End: 2020-07-01
Payer: MEDICARE

## 2020-07-01 NOTE — PROGRESS NOTES
Patient has a history of ascending aortic aneurysm, identified in 2015 and stable on serial imaging since that time  He has been followed in our aortic clinic  Recent Chest CT scan performed 6/30/20 has been reviewed by Dr Sisi Ramirez  The ascending aneurysm remains stable  Patient may undergo orthopedic surgery and as always, adequate  BP management is recommended  Next follow up in aortic clinic with repeat imaging is recommended in 2 years

## 2020-07-01 NOTE — PRE-PROCEDURE INSTRUCTIONS
Pre-Surgery Instructions:   Medication Instructions    EPINEPHrine (EPIPEN 2-STEPHANI) 0 3 mg/0 3 mL SOAJ Instructed patient per Anesthesia Guidelines   lisinopril (ZESTRIL) 5 mg tablet Instructed patient per Anesthesia Guidelines   metoprolol succinate (TOPROL-XL) 50 mg 24 hr tablet Instructed patient per Anesthesia Guidelines  Pre op instructions given  Andrew Zimmerman [phone]  Pt to have Covid-19 test on arrivalMy Surgical Experience    The following information was developed to assist you to prepare for your operation  What do I need to do before coming to the hospital?   Arrange for a responsible person to drive you to and from the hospital    Arrange care for your children at home  Children are not allowed in the recovery areas of the hospital   Plan to wear clothing that is easy to put on and take off  If you are having shoulder surgery, wear a shirt that buttons or zippers in the front  Bathing  o Shower the evening before and the morning of your surgery with an antibacterial soap  Please refer to the Pre Op Showering Instructions for Surgery Patients Sheet   o Remove nail polish and all body piercing jewelry  o Do not shave any body part for at least 24 hours before surgery-this includes face, arms, legs and upper body  Food  o Nothing to eat or drink after midnight the night before your surgery  This includes candy and chewing gum  o Exception: If your surgery is after 12:00pm (noon), you may have clear liquids such as 7-Up®, ginger ale, apple or cranberry juice, Jell-O®, water, or clear broth until 8:00 am  o Do not drink milk or juice with pulp on the morning before surgery  o Do not drink alcohol 24 hours before surgery  Medicine  o Follow instructions you received from your surgeon about which medicines you may take on the day of surgery  o If instructed to take medicine on the morning of surgery, take pills with just a small sip of water   Call your prescribing doctor for specific infroamtion on what to do if you take insulin    What should I bring to the hospital?    Bring:  Elisabet Coombe or a walker, if you have them, for foot or knee surgery   A list of the daily medicines, vitamins, minerals, herbals and nutritional supplements you take  Include the dosages of medicines and the time you take them each day   Glasses, dentures or hearing aids   Minimal clothing; you will be wearing hospital sleepwear   Photo ID; required to verify your identity   If you have a Living Will or Power of , bring a copy of the documents   If you have an ostomy, bring an extra pouch and any supplies you use    Do not bring   Medicines or inhalers   Money, valuables or jewelry    What other information should I know about the day of surgery?  Notify your surgeons if you develop a cold, sore throat, cough, fever, rash or any other illness   Report to the Ambulatory Surgical/Same Day Surgery Unit   You will be instructed to stop at Registration only if you have not been pre-registered   Inform your  fi they do not stay that they will be asked by the staff to leave a phone number where they can be reached   Be available to be reached before surgery  In the event the operating room schedule changes, you may be asked to come in earlier or later than expected    *It is important to tell your doctor and others involved in your health care if you are taking or have been taking any non-prescription drugs, vitamins, minerals, herbals or other nutritional supplements   Any of these may interact with some food or medicines and cause a reaction

## 2020-07-02 ENCOUNTER — ANESTHESIA (OUTPATIENT)
Dept: PERIOP | Facility: HOSPITAL | Age: 65
End: 2020-07-02
Payer: MEDICARE

## 2020-07-02 ENCOUNTER — HOSPITAL ENCOUNTER (OUTPATIENT)
Facility: HOSPITAL | Age: 65
Setting detail: OUTPATIENT SURGERY
Discharge: HOME | End: 2020-07-02
Attending: ORTHOPAEDIC SURGERY | Admitting: ORTHOPAEDIC SURGERY
Payer: MEDICARE

## 2020-07-02 VITALS
WEIGHT: 289 LBS | HEIGHT: 71 IN | BODY MASS INDEX: 40.46 KG/M2 | SYSTOLIC BLOOD PRESSURE: 145 MMHG | TEMPERATURE: 97.4 F | RESPIRATION RATE: 18 BRPM | OXYGEN SATURATION: 95 % | HEART RATE: 59 BPM | DIASTOLIC BLOOD PRESSURE: 79 MMHG

## 2020-07-02 DIAGNOSIS — S76.111A RUPTURE OF RIGHT QUADRICEPS TENDON, INITIAL ENCOUNTER: ICD-10-CM

## 2020-07-02 DIAGNOSIS — Z11.59 SCREENING FOR VIRAL DISEASE: ICD-10-CM

## 2020-07-02 DIAGNOSIS — Z01.812 PRE-PROCEDURAL LABORATORY EXAMINATION: ICD-10-CM

## 2020-07-02 LAB — SARS-COV-2 RNA RESP QL NAA+PROBE: NEGATIVE

## 2020-07-02 PROCEDURE — C9290 INJ, BUPIVACAINE LIPOSOME: HCPCS | Performed by: ANESTHESIOLOGY

## 2020-07-02 PROCEDURE — 27385 REPAIR OF THIGH MUSCLE: CPT | Performed by: ORTHOPAEDIC SURGERY

## 2020-07-02 PROCEDURE — 87635 SARS-COV-2 COVID-19 AMP PRB: CPT | Performed by: ORTHOPAEDIC SURGERY

## 2020-07-02 DEVICE — KIT REPAIR LIGAMENT AUG MCL INTERNALBRACE: Type: IMPLANTABLE DEVICE | Site: FEMUR | Status: FUNCTIONAL

## 2020-07-02 RX ORDER — MIDAZOLAM HYDROCHLORIDE 2 MG/2ML
INJECTION, SOLUTION INTRAMUSCULAR; INTRAVENOUS
Status: DISCONTINUED | OUTPATIENT
Start: 2020-07-02 | End: 2020-07-02 | Stop reason: SURG

## 2020-07-02 RX ORDER — MAGNESIUM HYDROXIDE 1200 MG/15ML
LIQUID ORAL AS NEEDED
Status: DISCONTINUED | OUTPATIENT
Start: 2020-07-02 | End: 2020-07-02 | Stop reason: HOSPADM

## 2020-07-02 RX ORDER — FENTANYL CITRATE/PF 50 MCG/ML
25 SYRINGE (ML) INJECTION
Status: DISCONTINUED | OUTPATIENT
Start: 2020-07-02 | End: 2020-07-02 | Stop reason: HOSPADM

## 2020-07-02 RX ORDER — CEFAZOLIN SODIUM 2 G/50ML
2000 SOLUTION INTRAVENOUS ONCE
Status: COMPLETED | OUTPATIENT
Start: 2020-07-02 | End: 2020-07-02

## 2020-07-02 RX ORDER — CEFAZOLIN SODIUM 1 G/3ML
INJECTION, POWDER, FOR SOLUTION INTRAMUSCULAR; INTRAVENOUS AS NEEDED
Status: DISCONTINUED | OUTPATIENT
Start: 2020-07-02 | End: 2020-07-02 | Stop reason: SURG

## 2020-07-02 RX ORDER — FENTANYL CITRATE 50 UG/ML
INJECTION, SOLUTION INTRAMUSCULAR; INTRAVENOUS
Status: DISCONTINUED | OUTPATIENT
Start: 2020-07-02 | End: 2020-07-02 | Stop reason: SURG

## 2020-07-02 RX ORDER — FENTANYL CITRATE 50 UG/ML
INJECTION, SOLUTION INTRAMUSCULAR; INTRAVENOUS AS NEEDED
Status: DISCONTINUED | OUTPATIENT
Start: 2020-07-02 | End: 2020-07-02 | Stop reason: SURG

## 2020-07-02 RX ORDER — BUPIVACAINE HYDROCHLORIDE 2.5 MG/ML
INJECTION, SOLUTION EPIDURAL; INFILTRATION; INTRACAUDAL
Status: COMPLETED | OUTPATIENT
Start: 2020-07-02 | End: 2020-07-02

## 2020-07-02 RX ORDER — HYDROMORPHONE HCL/PF 1 MG/ML
0.2 SYRINGE (ML) INJECTION
Status: DISCONTINUED | OUTPATIENT
Start: 2020-07-02 | End: 2020-07-02 | Stop reason: HOSPADM

## 2020-07-02 RX ORDER — OXYCODONE HYDROCHLORIDE AND ACETAMINOPHEN 5; 325 MG/1; MG/1
1 TABLET ORAL EVERY 4 HOURS PRN
Qty: 15 TABLET | Refills: 0 | Status: SHIPPED | OUTPATIENT
Start: 2020-07-02 | End: 2020-09-25

## 2020-07-02 RX ORDER — LIDOCAINE HYDROCHLORIDE 10 MG/ML
INJECTION, SOLUTION EPIDURAL; INFILTRATION; INTRACAUDAL; PERINEURAL AS NEEDED
Status: DISCONTINUED | OUTPATIENT
Start: 2020-07-02 | End: 2020-07-02 | Stop reason: SURG

## 2020-07-02 RX ORDER — SODIUM CHLORIDE, SODIUM LACTATE, POTASSIUM CHLORIDE, CALCIUM CHLORIDE 600; 310; 30; 20 MG/100ML; MG/100ML; MG/100ML; MG/100ML
125 INJECTION, SOLUTION INTRAVENOUS CONTINUOUS
Status: DISCONTINUED | OUTPATIENT
Start: 2020-07-02 | End: 2020-07-02 | Stop reason: HOSPADM

## 2020-07-02 RX ORDER — DEXAMETHASONE SODIUM PHOSPHATE 10 MG/ML
INJECTION, SOLUTION INTRAMUSCULAR; INTRAVENOUS AS NEEDED
Status: DISCONTINUED | OUTPATIENT
Start: 2020-07-02 | End: 2020-07-02 | Stop reason: SURG

## 2020-07-02 RX ORDER — ONDANSETRON 2 MG/ML
INJECTION INTRAMUSCULAR; INTRAVENOUS AS NEEDED
Status: DISCONTINUED | OUTPATIENT
Start: 2020-07-02 | End: 2020-07-02 | Stop reason: SURG

## 2020-07-02 RX ORDER — HYDROMORPHONE HCL/PF 1 MG/ML
SYRINGE (ML) INJECTION AS NEEDED
Status: DISCONTINUED | OUTPATIENT
Start: 2020-07-02 | End: 2020-07-02 | Stop reason: SURG

## 2020-07-02 RX ORDER — OXYCODONE HYDROCHLORIDE AND ACETAMINOPHEN 5; 325 MG/1; MG/1
1 TABLET ORAL EVERY 4 HOURS PRN
Status: DISCONTINUED | OUTPATIENT
Start: 2020-07-02 | End: 2020-07-02 | Stop reason: HOSPADM

## 2020-07-02 RX ORDER — ONDANSETRON 2 MG/ML
4 INJECTION INTRAMUSCULAR; INTRAVENOUS ONCE AS NEEDED
Status: DISCONTINUED | OUTPATIENT
Start: 2020-07-02 | End: 2020-07-02 | Stop reason: HOSPADM

## 2020-07-02 RX ORDER — PROPOFOL 10 MG/ML
INJECTION, EMULSION INTRAVENOUS CONTINUOUS PRN
Status: DISCONTINUED | OUTPATIENT
Start: 2020-07-02 | End: 2020-07-02 | Stop reason: SURG

## 2020-07-02 RX ORDER — PROPOFOL 10 MG/ML
INJECTION, EMULSION INTRAVENOUS AS NEEDED
Status: DISCONTINUED | OUTPATIENT
Start: 2020-07-02 | End: 2020-07-02 | Stop reason: SURG

## 2020-07-02 RX ADMIN — FENTANYL CITRATE 25 MCG: 50 INJECTION INTRAMUSCULAR; INTRAVENOUS at 16:55

## 2020-07-02 RX ADMIN — FENTANYL CITRATE 100 MCG: 50 INJECTION, SOLUTION INTRAMUSCULAR; INTRAVENOUS at 15:24

## 2020-07-02 RX ADMIN — HYDROMORPHONE HYDROCHLORIDE 0.2 MG: 1 INJECTION, SOLUTION INTRAMUSCULAR; INTRAVENOUS; SUBCUTANEOUS at 15:44

## 2020-07-02 RX ADMIN — FENTANYL CITRATE 25 MCG: 50 INJECTION INTRAMUSCULAR; INTRAVENOUS at 17:06

## 2020-07-02 RX ADMIN — SODIUM CHLORIDE, SODIUM LACTATE, POTASSIUM CHLORIDE, AND CALCIUM CHLORIDE 125 ML/HR: .6; .31; .03; .02 INJECTION, SOLUTION INTRAVENOUS at 13:49

## 2020-07-02 RX ADMIN — ONDANSETRON 4 MG: 2 INJECTION INTRAMUSCULAR; INTRAVENOUS at 16:05

## 2020-07-02 RX ADMIN — LIDOCAINE HYDROCHLORIDE 50 MG: 10 INJECTION, SOLUTION EPIDURAL; INFILTRATION; INTRACAUDAL; PERINEURAL at 15:07

## 2020-07-02 RX ADMIN — CEFAZOLIN 1000 MG: 1 INJECTION, POWDER, FOR SOLUTION INTRAVENOUS at 15:10

## 2020-07-02 RX ADMIN — MIDAZOLAM HYDROCHLORIDE 2 MG: 1 INJECTION, SOLUTION INTRAMUSCULAR; INTRAVENOUS at 15:24

## 2020-07-02 RX ADMIN — PROPOFOL 30 MG: 10 INJECTION, EMULSION INTRAVENOUS at 16:15

## 2020-07-02 RX ADMIN — OXYCODONE HYDROCHLORIDE AND ACETAMINOPHEN 1 TABLET: 5; 325 TABLET ORAL at 17:14

## 2020-07-02 RX ADMIN — HYDROMORPHONE HYDROCHLORIDE 0.1 MG: 1 INJECTION, SOLUTION INTRAMUSCULAR; INTRAVENOUS; SUBCUTANEOUS at 16:07

## 2020-07-02 RX ADMIN — FENTANYL CITRATE 25 MCG: 50 INJECTION INTRAMUSCULAR; INTRAVENOUS at 16:50

## 2020-07-02 RX ADMIN — PROPOFOL 120 MCG/KG/MIN: 10 INJECTION, EMULSION INTRAVENOUS at 15:15

## 2020-07-02 RX ADMIN — FENTANYL CITRATE 100 MCG: 50 INJECTION, SOLUTION INTRAMUSCULAR; INTRAVENOUS at 15:15

## 2020-07-02 RX ADMIN — PROPOFOL 20 MG: 10 INJECTION, EMULSION INTRAVENOUS at 16:18

## 2020-07-02 RX ADMIN — SODIUM CHLORIDE, SODIUM LACTATE, POTASSIUM CHLORIDE, AND CALCIUM CHLORIDE: .6; .31; .03; .02 INJECTION, SOLUTION INTRAVENOUS at 16:12

## 2020-07-02 RX ADMIN — BUPIVACAINE HYDROCHLORIDE 10 ML: 2.5 INJECTION, SOLUTION EPIDURAL; INFILTRATION; INTRACAUDAL at 15:24

## 2020-07-02 RX ADMIN — FENTANYL CITRATE 25 MCG: 50 INJECTION INTRAMUSCULAR; INTRAVENOUS at 17:01

## 2020-07-02 RX ADMIN — DEXAMETHASONE SODIUM PHOSPHATE 4 MG: 10 INJECTION, SOLUTION INTRAMUSCULAR; INTRAVENOUS at 15:09

## 2020-07-02 RX ADMIN — HYDROMORPHONE HYDROCHLORIDE 0.2 MG: 1 INJECTION, SOLUTION INTRAMUSCULAR; INTRAVENOUS; SUBCUTANEOUS at 15:35

## 2020-07-02 RX ADMIN — PROPOFOL 150 MG: 10 INJECTION, EMULSION INTRAVENOUS at 15:07

## 2020-07-02 RX ADMIN — CEFAZOLIN SODIUM 2000 MG: 2 SOLUTION INTRAVENOUS at 15:09

## 2020-07-02 NOTE — ANESTHESIA POSTPROCEDURE EVALUATION
Post-Op Assessment Note    CV Status:  Stable  Pain Score: 0    Pain management: adequate     Mental Status:  Alert and awake   Hydration Status:  Euvolemic and stable   PONV Controlled:  Controlled   Airway Patency:  Patent   Post Op Vitals Reviewed: Yes      Staff: Anesthesiologist, CRNA   Comments: Report given to recovering RN, VSS, Pt states he is comfortable          /76 (07/02/20 1638)    Temp (!) 97 4 °F (36 3 °C) (07/02/20 1638)    Pulse 60 (07/02/20 1638)   Resp 16 (07/02/20 1638)    SpO2 95 % (07/02/20 1638)

## 2020-07-02 NOTE — ANESTHESIA PROCEDURE NOTES
Peripheral Block    Patient location during procedure: pre-op  Start time: 7/2/2020 2:45 PM  Reason for block: procedure for pain, at surgeon's request and post-op pain management  Staffing  Anesthesiologist: Valentino Hinton MD  Performed: anesthesiologist   Preanesthetic Checklist  Completed: patient identified, site marked, surgical consent, pre-op evaluation, timeout performed, IV checked, risks and benefits discussed and monitors and equipment checked  Peripheral Block  Patient position: supine  Prep: ChloraPrep  Patient monitoring: heart rate, cardiac monitor and continuous pulse ox  Block type: adductor canal block  Laterality: right  Injection technique: single-shot  Procedures: ultrasound guided, Ultrasound guidance required for the procedure to increase accuracy and safety of medication placement and decrease risk of complications    Ultrasound permanent image savedbupivacaine (MARCAINE) 0 25 % perineural infiltration, 10 mL  midazolam (VERSED) 2 mg/2 mL IV, 2 mg  fentaNYL 50 mcg/mL IV, 100 mcg (exparel 20 ml)  Needle  Needle type: Stimuplex   Needle gauge: 22 G  Needle length: 10 cm  Needle localization: anatomical landmarks and ultrasound guidance  Needle insertion depth: 4 cm  Test dose: negative  Assessment  Injection assessment: incremental injection, local visualized surrounding nerve on ultrasound, negative aspiration for heme and no paresthesia on injection  Paresthesia pain: none  Heart rate change: no  Slow fractionated injection: yes  Post-procedure:  site cleaned  patient tolerated the procedure well with no immediate complications

## 2020-07-02 NOTE — OP NOTE
OPERATIVE REPORT  PATIENT NAMEOlvin Goddard    :  1955  MRN: [id number]  Pt Location: [facility] OR ROOM 01    SURGERY DATE: 2020    Surgeon(s) and Role:     * Raymundo Zepeda MD - Primary     * Andres Cullen PA-C - Assisting necessary for the procedure for assistance with repair techniques and placement of the anchors and assistance with retraction of vital structures  Rock HAYES  And OTC 2nd assistant    Preop Diagnosis:  Rupture of right quadriceps tendon, initial encounter [S76 111A]    Post-Op Diagnosis Codes:     * Rupture of right quadriceps tendon, initial encounter [S76 111A]    Procedure(s) (LRB):  REPAIR TENDON QUADRICEPS RIGHT KNEE (Right) utilizing Arthrex 4 75 mm SwiveLock anchors x2 with FiberTape and FiberWire sutures as well as multiple interrupted FiberWire sutures    Specimen(s):  * No specimens in log *    Estimated Blood Loss:   100 mL    Drains:  * No LDAs found *    Anesthesia Type:   General    Operative Indications:  Rupture of right quadriceps tendon, initial encounter Chloe Cruz is a 17-year-old male who has been suffering with right knee pain and weakness since he suffered a distal quadriceps tendon rupture within the past week  He had a CT scan that demonstrated a rupture and physical examination also demonstrated a rupture  He was unable to perform a straight leg raise and had an obvious gap between the patella and the quadriceps tendon  He understood the risks and benefits of a right knee quadriceps tendon repair and wished to go ahead  The risks are inclusive of but not limited to infection, stiffness, nerve injury causing numbness pain and weakness, worsening of symptoms, failure to regain full strength and ability, blood clots, medical problems perioperatively, persistent limp, recurrent tear, and need for further surgery      Operative Findings:  Right knee examined demonstrated a complete rupture of the quadriceps tendon off of the patella with significant calcific deposits both that the quadriceps tendon as well as at the proximal pole of the patella that were debrided  The medial and lateral retinaculum were also torn completely  We did have however a very stable repair of the tendon back to the proximal pole of the patella to a bleeding bed of bone  Range of motion was passively 0-90 degrees without any signs of gapping at the end of the procedure  Complications:   None    Procedure and Technique:  Les was taken to the operating room and placed supine on the OR table  He was given preoperative IV antibiotics as well as preoperative regional anesthesia by the attending anesthesiologist   General anesthesia was induced and the right lower extremity was prepped and draped in usual sterile fashion  A surgical time-out was taken  We did have to use his prior incision from a previous knee surgery as we were not able to get an appropriate skin bridge with the new incision  Thus, we had to utilize a 15 blade to go through the old incision which was more along the anterolateral aspect of the knee and did have to curve it medially in the distal and proximal aspects of the incision  We did come down through skin and subcutaneous tissues and then entered into the fascia where we demonstrated a large hemarthrosis  We did evacuate the hemarthrosis  We exposed the obvious tendon rupture which was complete and with medial and lateral retinacular tears as well  There were significant calcium deposits that were found throughout the proximal aspect of the patella as well as the distal aspect of the quadriceps tendon  We did debride those as much as possible utilizing a rongeur as well as scissors  We did debride the proximal pole of patella back to a bleeding bed of bone utilizing a rongeur as well to assist in healing response  We then whipstitched the quadriceps tendon with FiberTape sutures x2    We then drilled for and tapped and placed the SwiveLock anchors x2 utilizing the FiberTape sutures for implantation  We did have the knee fully extended throughout the repair  We had good apposition of tendon to a bleeding bed of bone at this portion of the procedure  We then utilized the FiberWire suture from each of the SwiveLock anchors and through horizontal mattress sutures into the quadriceps tendon and tied those down as well for additional fixation  We did utilized the FiberTape suture ends and passed them through the soft tissue covering the patella in horizontal mattress configuration and tied those down as well for additional support  We then utilized interrupted FiberWire sutures for the medial and lateral retinacular repairs  We irrigated thoroughly throughout the procedure  We then ranged the knee and allowed it to go to 90° of flexion and saw no gapping on multiple attempts  We then irrigated further and closed with 2-0 Vicryl and 4-0 Vicryl and skin glue  We applied dry, sterile dressings with an Ace bandage and a knee brace  He tolerated the procedure well and transferred to recovery room stable condition  No tourniquet was utilized during the procedure  He will follow up in 1 week for wound check  We would like to place him on anticoagulation if allowed by his cardiothoracic surgeon as he does have a history of an aortic aneurysm as well as aneurysms into his lower extremities for which she is being followed  We will wait to hear back from the cardiothoracic surgeon regarding that today  We would at least want to place him on aspirin although Lovenox would be preferable as he is at high risk for DVT  He will be on the quadriceps tendon repair rehabilitation protocol for physical therapy     I was present for the entire procedure and A qualified resident physician was not available    Patient Disposition:  PACU     SIGNATURE: Marian Milligan MD  DATE: July 2, 2020  TIME: 4:06 PM

## 2020-07-02 NOTE — DISCHARGE INSTRUCTIONS
Quadriceps tendon repair    Crutches:  Use the crutches when walking as you were taught at the surgical center  Put as much weight on your leg as you can tolerate, but you must have your brace locked in full extension when doing so  CPM (continuous passive motion) Machine:   You should use this machine for 2-3 hours at home twice daily for a total of 4-6 hours per day  The company will explain how to operate the CPM  Your goal with this machine is to achieve 90 degrees of flexion (bending) comfortably  Every time you use the CPM you should try to increase the flexion by 5 degrees  You will experience some discomfort while trying to increase your flexion  Brace: The knee brace given to you immediately after surgery must by worn while walking and sleeping in locked extension (leg straight)  You may take the brace off when doing exercises and/or in the CPM machine  Brace hinges must be at the level of the knee cap  You may loosen or tighten the brace straps as necessary, but it should be snug  You will need to wear the brace for about 4-6 weeks  Dressing:   Leave dressing in place until your first post op appointment  Showering: You may shower 48 hours after surgery once the dressing has been removed, however you must place a plastic bag over the brace while showering  Please use CAUTION!! Be careful not to slip, twist, or fall  A stool placed in the shower so you can sit is a great idea so you can stabilize your knee  Do not soak in a bath tub, hot tub, or pool until the doctor tells you it is O K  to do so  Elevation:   When you are not walking your leg should be straight with a pillow under your foot or ankle (not behind your knee)  Try to elevate knee as much as possible to reduce swelling  Ice:   You should ice the knee as often as possible (especially after exercise) to reduce swelling and discomfort  Do not ice the knee more than 20 minutes at a time   Let the knee warm up before reapplication  Avoid getting your wound wet  Aspirin:  Unless otherwise noted, take one 325mg aspirin daily for the first 3 weeks following surgery  This is to help decrease the risk of blood clots  Follow-up visit:   You need to see the doctor about one week following surgery for your first post-op visit  You will be given a physical therapy prescription to begin physical therapy if you were not already given one  Common concerns: 1  Numbness around the incision site on the outside part of the knee is a result of a disruption of a superficial nerve during the operative procedure  Most of this will resolve over time but a small area the size of a quarter usually remains numb  This is unavoidable because of the proximity of the nerve to the incision  2  A sudden rush or feeling of fullness with pain when going from a sitting to a standing position in the knee is common after surgery  3  Bruising and/or swelling of the shin and ankle is common after surgery  This usually occurs 3-4 days after surgery  This is caused by bleeding from the bone (which is cut during surgery) into the area just below the skin  To relieve this discomfort it is best to ice the leg  If at any time you have discomfort, swelling, or redness in the calf (behind the leg between the knee and the ankle) please call the doctor immediately  Please call if:   1  If at any time you have discomfort, swelling, or redness in the calf (behind the leg between the knee and the ankle) please call the doctor immediately  2  Any oozing or redness of the wound, fevers (>101 3 degrees F), or chills  3  Any difficulty breathing or heaviness in the chest      REMEMBER - these are only guidelines for what to expect following ACL surgery  If you have any questions or concerns, please do not hesitate to call the office at any time [phone]

## 2020-07-02 NOTE — ANESTHESIA PREPROCEDURE EVALUATION
Review of Systems/Medical History  Patient summary reviewed  Chart reviewed  History of anesthetic complications PONV    Cardiovascular  Hyperlipidemia, Hypertension ,   Comment: Multiple aneurysms thoracic, splenic  ,  Pulmonary  Negative pulmonary ROS Sleep apnea ,        GI/Hepatic    Liver disease ,        Negative  ROS        Endo/Other  Negative endo/other ROS   Obesity  morbid obesity   GYN  Negative gynecology ROS          Hematology   Musculoskeletal    Arthritis     Neurology    CVA , no residual symptoms,    Psychology   Negative psychology ROS              Physical Exam    Airway    Mallampati score: III  TM Distance: >3 FB  Neck ROM: full     Dental   upper dentures,     Cardiovascular  Rhythm: regular, Rate: normal,     Pulmonary  Breath sounds clear to auscultation, Decreased breath sounds,     Other Findings        Anesthesia Plan  ASA Score- 4     Anesthesia Type- general and regional with ASA Monitors  Additional Monitors:   Airway Plan: LMA  Plan Factors-    Induction- intravenous  Postoperative Plan- Plan for postoperative opioid use  Informed Consent- Anesthetic plan and risks discussed with patient  I personally reviewed this patient with the CRNA  Discussed and agreed on the Anesthesia Plan with the CRNA  Arina Resendez

## 2020-07-03 NOTE — ADDENDUM NOTE
Addendum  created 07/02/20 2128 by Gibson Cazares MD    Diagnosis association updated, Intraprocedure Blocks edited, Sign clinical note

## 2020-07-08 ENCOUNTER — TELEPHONE (OUTPATIENT)
Dept: OBGYN CLINIC | Facility: CLINIC | Age: 65
End: 2020-07-08

## 2020-07-08 DIAGNOSIS — S76.111A QUADRICEPS TENDON RUPTURE, RIGHT, INITIAL ENCOUNTER: Primary | ICD-10-CM

## 2020-07-08 NOTE — TELEPHONE ENCOUNTER
Dr Bora Ledesma had quadricept tendon repair and wants to know if you will be ordering a CPM machine  Please call [phone]  Thank you

## 2020-07-08 NOTE — TELEPHONE ENCOUNTER
Wife, Mauricio Lee had another question regarding the incision  She removed the brace to look at the leg, check it, looks good bruising wise and no hot spots, no warmth, however, should she be removing the dressing or leave it on  If she's to remove it, she'll need specific instructions on what care to do to it

## 2020-07-09 NOTE — TELEPHONE ENCOUNTER
Patient wife is calling back about the CPM Machine  She wanted to know if he should start using that right away  Also, she wanted to know if if needs to use ice afterwards          CB: [phone]

## 2020-07-10 ENCOUNTER — OFFICE VISIT (OUTPATIENT)
Dept: OBGYN CLINIC | Facility: CLINIC | Age: 65
End: 2020-07-10

## 2020-07-10 VITALS — DIASTOLIC BLOOD PRESSURE: 77 MMHG | TEMPERATURE: 97.3 F | HEART RATE: 64 BPM | SYSTOLIC BLOOD PRESSURE: 133 MMHG

## 2020-07-10 DIAGNOSIS — L03.115 CELLULITIS OF RIGHT KNEE: ICD-10-CM

## 2020-07-10 DIAGNOSIS — S76.111A QUADRICEPS TENDON RUPTURE, RIGHT, INITIAL ENCOUNTER: Primary | ICD-10-CM

## 2020-07-10 PROCEDURE — 1160F RVW MEDS BY RX/DR IN RCRD: CPT | Performed by: PHYSICIAN ASSISTANT

## 2020-07-10 PROCEDURE — 4040F PNEUMOC VAC/ADMIN/RCVD: CPT | Performed by: PHYSICIAN ASSISTANT

## 2020-07-10 PROCEDURE — 3078F DIAST BP <80 MM HG: CPT | Performed by: PHYSICIAN ASSISTANT

## 2020-07-10 PROCEDURE — 3075F SYST BP GE 130 - 139MM HG: CPT | Performed by: PHYSICIAN ASSISTANT

## 2020-07-10 PROCEDURE — 99024 POSTOP FOLLOW-UP VISIT: CPT | Performed by: PHYSICIAN ASSISTANT

## 2020-07-10 RX ORDER — CEPHALEXIN 500 MG/1
500 CAPSULE ORAL EVERY 6 HOURS SCHEDULED
Qty: 28 CAPSULE | Refills: 0 | Status: SHIPPED | OUTPATIENT
Start: 2020-07-10 | End: 2020-07-17
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
## 2020-07-13 ENCOUNTER — EVALUATION (OUTPATIENT)
Dept: PHYSICAL THERAPY | Facility: CLINIC | Age: 65
End: 2020-07-13
Payer: MEDICARE

## 2020-07-13 DIAGNOSIS — M25.561 ACUTE PAIN OF RIGHT KNEE: ICD-10-CM

## 2020-07-13 DIAGNOSIS — S76.111D QUADRICEPS TENDON RUPTURE, RIGHT, SUBSEQUENT ENCOUNTER: Primary | ICD-10-CM

## 2020-07-13 PROCEDURE — 97161 PT EVAL LOW COMPLEX 20 MIN: CPT | Performed by: PHYSICAL THERAPIST

## 2020-07-13 PROCEDURE — 97110 THERAPEUTIC EXERCISES: CPT | Performed by: PHYSICAL THERAPIST

## 2020-07-13 NOTE — PROGRESS NOTES
PT Evaluation     Today's date: 2020  Patient name: Marino Padron  : 1955  MRN: [id number]  Referring provider: Glynn Smith  Dx:   Encounter Diagnosis     ICD-10-CM    1  Quadriceps tendon rupture, right, subsequent encounter S76 111D Ambulatory referral to Physical Therapy   2  Acute pain of right knee M25 561        Start Time: 1145  Stop Time: 1230  Total time in clinic (min): 45 minutes    Assessment  Assessment details: Marino Padron is a 77 y o  male who presents with pain, decreased strength, decreased ROM, decreased joint mobility and ambulatory dysfunction  Due to these impairments, patient has difficulty performing ADL's, recreational activities, ambulation, stair negotiation, lifting/carrying, transfers  Patient's clinical presentation is consistent with their referring diagnosis of Quadriceps tendon rupture, right, subsequent encounter  (primary encounter diagnosis) and R knee pain following tendon rupture repair on 20  Patient has been educated in home exercise program and plan of care   Patient would benefit from skilled physical therapy services to address their aforementioned functional limitations and progress towards prior level of function and independence with home exercise program      Impairments: abnormal gait, abnormal or restricted ROM, activity intolerance, impaired physical strength, lacks appropriate home exercise program, pain with function, weight-bearing intolerance, poor posture  and poor body mechanics  Understanding of Dx/Px/POC: good   Prognosis: good    Goals  Short Term Goals to be accomplished in 6 weeks:  STG1: Pt will be I with HEP  STG2: Pt will demo 50% inc in knee PROM  STG3: Pt will demo 1/2 MMT strength in knee   STG4: Pt will amb community distance with LRAD without pain increase     Long Term Goals to be accomplished in 12 weeks:   LTG1: Pt will demo knee strength WFL to return to PLOF  LTG2: Pt will demo knee AROM WNL to minimize gait deviations  LTG3: Pt will return to household ADLs and law duties pain free as per PLOF      Plan  Plan details:  HEP development, stretching, strengthening, A/AA/PROM, joint mobilizations, posture education, STM/MI as needed to reduce muscle tension, muscle reeducation, PLOC discussed and agreed upon with patient  Patient would benefit from: PT eval and skilled physical therapy  Planned modality interventions: cryotherapy and thermotherapy: hydrocollator packs  Planned therapy interventions: manual therapy, neuromuscular re-education, self care, therapeutic activities, therapeutic exercise and home exercise program  Frequency: 2x week (2-3x/week)  Duration in weeks: 6  Treatment plan discussed with: patient        Subjective Evaluation    History of Present Illness  Mechanism of injury: Pt presents day 11 post op R quadriceps tendon repair  Surgery related to an injury that occurred when he lost his balance from a height, landing on R knee, 6/30/20  Pt reports major difficulty with xfers getting in and out of tub and up and down from chairs  Pt is sliding into the back seat of the car due to locked brace  Pt is icing regularly, is not taking pain medication other than Tylenol  Pt is not experiencing disturbed sleep  He has 1 FOS to get to second floor and he is using a crutch and railing  Pt goals include driving, push mowing lawn and riding on tractor for lawn care, fishing as well on his boat, as well as community errands  Pt reports his CPM setting for today will be 50 degrees, denies pain with this  PMHX includes a R sided CVA, L quad tendon rupture/repair, Aneurysms  Quality of life: good    Pain  Current pain rating: 3  At best pain rating: 3  At worst pain rating: 3          Objective     Observations     Additional Observation Details  Incision intact, healing well  Moderate swelling and redness, warmth, dec sensation however related to PMHx      Active Range of Motion   Left Knee   Normal active range of motion    Additional Active Range of Motion Details  NT due to PROTOCOL restrictions    Passive Range of Motion   Left Knee   Normal passive range of motion    Right Knee   Flexion: 35 degrees   Extension: 1 degrees     Strength/Myotome Testing     Additional Strength Details  L LE strength WFL  R Hip MMT 5/5 throughout  R knee MMT NT due to PROTOCOL  R ankle MMT 4+/5 throughout      General Comments:      Knee Comments  Function:    Xfers: Major UE dependency and modification to maintain full knee extension in locked brace STS chair  Bed mob: I without assist R LE during sup/sit  Gait: Amb with RW with good heel strike and mild circumduction due to locked brace, slow alicia      Stairs: Step to pattern with AC and rail, locked Brace        Flowsheet Rows      Most Recent Value   PT/OT G-Codes   Current Score  31   Projected Score  54   FOTO information reviewed  Yes             Precautions: Standard      Manuals 7/13/20             Visit 1                                                   Neuro Re-Ed                                                                                                        Ther Ex             QS 2x10            Ankle Pumps 20x            Hamstring str w SOS 5x10s            Glute sets 2x10                                                                Ther Activity                                       Gait Training              With RW in clinic, vc's                         Modalities

## 2020-07-16 ENCOUNTER — TELEPHONE (OUTPATIENT)
Dept: OBGYN CLINIC | Facility: HOSPITAL | Age: 65
End: 2020-07-16

## 2020-07-16 ENCOUNTER — OFFICE VISIT (OUTPATIENT)
Dept: PHYSICAL THERAPY | Facility: CLINIC | Age: 65
End: 2020-07-16
Payer: MEDICARE

## 2020-07-16 DIAGNOSIS — M25.561 ACUTE PAIN OF RIGHT KNEE: ICD-10-CM

## 2020-07-16 DIAGNOSIS — S76.111A RUPTURE OF RIGHT QUADRICEPS TENDON, INITIAL ENCOUNTER: ICD-10-CM

## 2020-07-16 DIAGNOSIS — S76.111D QUADRICEPS TENDON RUPTURE, RIGHT, SUBSEQUENT ENCOUNTER: Primary | ICD-10-CM

## 2020-07-16 PROCEDURE — 97110 THERAPEUTIC EXERCISES: CPT | Performed by: PHYSICAL THERAPIST

## 2020-07-16 NOTE — PROGRESS NOTES
Daily Note     Today's date: 2020  Patient name: Victoria Torres  : 1955  MRN: [id number]  Referring provider: Adam Fernandes  Dx:   Encounter Diagnosis     ICD-10-CM    1  Quadriceps tendon rupture, right, subsequent encounter S76 111D    2  Acute pain of right knee M25 561        Start Time: 1330  Stop Time: 1415  Total time in clinic (min): 45 minutes    Subjective: Pt reports "I feel really good" after today's session  Pt denies any issues with HEP  Pt inquires to reasoning behind exercises and is thankful for explanation with all, very interested in protocol  Discussed use of AD  Discussed stairs sequencing  Objective: See treatment diary below  R knee heel slides to 66 degrees today with SOS  Inc edu to visual cue to prevent exceeding 90 degrees flexion  Progressed therex and HEP per protocol  Gait with RW with minimal UE use, 1% weight bearing through B UEs on AD per pt description  Assessment: Tolerated treatment well  Patient demo excdllent awareness ot rpotocol, restrictoins and general progression at this time  Pt shows good interest in his overall care  Pt is a pleasure to work with and is motivated toward est LTGs  Plan: Continue per plan of care  Precautions: Standard  PROTOCOL  Phase II do not exceed 90 deg until 20  Brace remains locked in full extension through Phase II        Manuals 20            Visit 1 Visit 2                                                  Neuro Re-Ed                                                                                                        Ther Ex             QS 2x10 2x10           Ankle Pumps 20x Heel raises 2x10           Hamstring str w SOS 5x10s 5x10s           Glute sets 2x10 Rev           Hip SLR  Standing Flex, Abd, Add Ext 2x10 ea           Heel slides  10x with SOS                                     Ther Activity                                       Gait Training              With RW in clinic, vc's With RW, stephie mgmt rev                        Modalities

## 2020-07-16 NOTE — TELEPHONE ENCOUNTER
Pt contacted Call Center requested refill of their medication  Patient used the last injection today, do you want him to continue it? IF so, please send to [facility]  Please advise, and OK TO LEAVE MESSAGE if no one can   Medication Name:enoxaparin (LOVENOX) 30 mg/0 3 mL       Dosage of Med:      Frequency of Med:      Remaining Medication:      Pharmacy and [address]         Pt  Preferred Callback Phone Number:[phone]      Thank you

## 2020-07-17 ENCOUNTER — OFFICE VISIT (OUTPATIENT)
Dept: OBGYN CLINIC | Facility: CLINIC | Age: 65
End: 2020-07-17

## 2020-07-17 DIAGNOSIS — S76.111A QUADRICEPS TENDON RUPTURE, RIGHT, INITIAL ENCOUNTER: Primary | ICD-10-CM

## 2020-07-17 PROCEDURE — 99024 POSTOP FOLLOW-UP VISIT: CPT | Performed by: ORTHOPAEDIC SURGERY

## 2020-07-17 PROCEDURE — 4040F PNEUMOC VAC/ADMIN/RCVD: CPT | Performed by: ORTHOPAEDIC SURGERY

## 2020-07-17 PROCEDURE — 3078F DIAST BP <80 MM HG: CPT | Performed by: ORTHOPAEDIC SURGERY

## 2020-07-17 PROCEDURE — 3075F SYST BP GE 130 - 139MM HG: CPT | Performed by: ORTHOPAEDIC SURGERY
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
## 2020-07-20 ENCOUNTER — OFFICE VISIT (OUTPATIENT)
Dept: PHYSICAL THERAPY | Facility: CLINIC | Age: 65
End: 2020-07-20
Payer: MEDICARE

## 2020-07-20 DIAGNOSIS — S76.111D QUADRICEPS TENDON RUPTURE, RIGHT, SUBSEQUENT ENCOUNTER: Primary | ICD-10-CM

## 2020-07-20 DIAGNOSIS — M25.561 ACUTE PAIN OF RIGHT KNEE: ICD-10-CM

## 2020-07-20 PROCEDURE — 97530 THERAPEUTIC ACTIVITIES: CPT | Performed by: PHYSICAL THERAPIST

## 2020-07-20 PROCEDURE — 97110 THERAPEUTIC EXERCISES: CPT | Performed by: PHYSICAL THERAPIST

## 2020-07-20 NOTE — PROGRESS NOTES
Daily Note     Today's date: 2020  Patient name: Angela Khan  : 1955  MRN: [id number]  Referring provider: Arnol Salgado  Dx:   Encounter Diagnosis     ICD-10-CM    1  Quadriceps tendon rupture, right, subsequent encounter S76 111D    2  Acute pain of right knee M25 561        Start Time: 1015  Stop Time: 1100  Total time in clinic (min): 45 minutes    Subjective: Pt acknowledges nil pain in knee, slight hamstring tightness noted and only slight discomfort with heel slide in hamstring as it relates to "working " He reports HEP is going well and his CPM is set to 65 degrees  Objective: See treatment diary below  R knee PROM flexion to 76 degrees comfortably using SOS  Assessment: Tolerated treatment well  Patient demo exclelent undertsanding of self pacing with HEP progresisng  Fatigued post treatment today  Cont to use AD minimally  Plan: Continue per plan of care  Precautions: Standard  PROTOCOL  Phase II do not exceed 90 deg until 20  Brace remains locked in full extension through Phase II        Manuals 20           Visit 1 Visit 2 Visit 3          PROM             Patellar mobs                          Neuro Re-Ed                                                                                                        Ther Ex             QS 2x10 2x10 20x          Ankle Pumps 20x Heel raises 2x10 rev          Hamstring str w SOS 5x10s 5x10s 5x 10s          Glute sets 2x10 Rev rev          Hip SLR  Standing Flex, Abd, Add Ext 2x10 ea Standing reviewed 10x ea          Heel slides  10x with SOS 20x supine with SOS, seated wihtout SOS 20x          Heel prop   5'                                                               Ther Activity                                       Gait Training              With RW in clinic, vc's With RW, stair mgmt rev // bars gait fwd/lat with 1 UE support 3 laps ea                       Modalities

## 2020-07-21 ENCOUNTER — HOSPITAL ENCOUNTER (OUTPATIENT)
Dept: RADIOLOGY | Facility: HOSPITAL | Age: 65
Discharge: HOME | End: 2020-07-21
Attending: INTERNAL MEDICINE
Payer: MEDICARE

## 2020-07-21 DIAGNOSIS — Q61.3 POLYCYSTIC KIDNEY DISEASE: ICD-10-CM

## 2020-07-21 PROCEDURE — 76770 US EXAM ABDO BACK WALL COMP: CPT

## 2020-07-23 ENCOUNTER — OFFICE VISIT (OUTPATIENT)
Dept: PHYSICAL THERAPY | Facility: CLINIC | Age: 65
End: 2020-07-23
Payer: MEDICARE

## 2020-07-23 DIAGNOSIS — M25.561 ACUTE PAIN OF RIGHT KNEE: Primary | ICD-10-CM

## 2020-07-23 DIAGNOSIS — S76.111D QUADRICEPS TENDON RUPTURE, RIGHT, SUBSEQUENT ENCOUNTER: ICD-10-CM

## 2020-07-23 PROCEDURE — 97110 THERAPEUTIC EXERCISES: CPT | Performed by: PHYSICAL THERAPIST

## 2020-07-23 PROCEDURE — 97530 THERAPEUTIC ACTIVITIES: CPT | Performed by: PHYSICAL THERAPIST

## 2020-07-23 NOTE — PROGRESS NOTES
Daily Note     Today's date: 2020  Patient name: Shana Blount  : 1955  MRN: [id number]  Referring provider: Benny Joshi  Dx:   Encounter Diagnosis     ICD-10-CM    1  Acute pain of right knee M25 561    2  Quadriceps tendon rupture, right, subsequent encounter S76 111D        Start Time: 1100  Stop Time: 1145  Total time in clinic (min): 45 minutes    Subjective: Pt reports he feels fatigued after his exercises and after PT, denies pain  "I really feel like I'm getting around better and it' bending better " pt reports CPM is at 70 degrees currently  Pt reports he notices an occasional sensation "it feels like it might buckle so that's why I keep the walker nearby " Pt denies pain in the knee or leg, only tightness with flexion based activities  Objective: See treatment diary below  R knee flexion PROM using SOS 88 degrees, nil pain  Assessment: Tolerated treatment well  Patient demo excellent progression at this time without any adverse effects at this time, with repsect to protocol  Plan: Continue per plan of care  Precautions: Standard  PROTOCOL  Phase II do not exceed 90 deg until 20  Brace remains locked in full extension through Phase II        Manuals 20          Visit 1 Visit 2 Visit 3 Visit 4         PROM    KT         Patellar mobs    KT                      Neuro Re-Ed                                                                                                        Ther Ex             QS 2x10 2x10 20x 30x         Ankle Pumps 20x Heel raises 2x10 rev rev         Hamstring str w SOS 5x10s 5x10s 5x 10s 5 x 10s         Glute sets 2x10 Rev rev rev         Hip SLR  Standing Flex, Abd, Add Ext 2x10 ea Standing reviewed 10x ea 10x ea         Heel slides  10x with SOS 20x supine with SOS, seated wihtout SOS 20x 30x ea         Heel prop   5'                                                               Ther Activity Gait Training              With RW in clinic, vc's With RW, stair mgmt rev // bars gait fwd/lat with 1 UE support 3 laps ea 3 laps ea                      Modalities

## 2020-07-27 ENCOUNTER — OFFICE VISIT (OUTPATIENT)
Dept: PHYSICAL THERAPY | Facility: CLINIC | Age: 65
End: 2020-07-27
Payer: MEDICARE

## 2020-07-27 DIAGNOSIS — S76.111D QUADRICEPS TENDON RUPTURE, RIGHT, SUBSEQUENT ENCOUNTER: Primary | ICD-10-CM

## 2020-07-27 DIAGNOSIS — M25.561 ACUTE PAIN OF RIGHT KNEE: ICD-10-CM

## 2020-07-27 PROCEDURE — 97110 THERAPEUTIC EXERCISES: CPT | Performed by: PHYSICAL THERAPIST

## 2020-07-27 PROCEDURE — 97530 THERAPEUTIC ACTIVITIES: CPT | Performed by: PHYSICAL THERAPIST

## 2020-07-27 NOTE — PROGRESS NOTES
Daily Note     Today's date: 2020  Patient name: Tamanna Fink  : 1955  MRN: [id number]  Referring provider: Langley Boeck  Dx:   Encounter Diagnosis     ICD-10-CM    1  Quadriceps tendon rupture, right, subsequent encounter S76 111D    2  Acute pain of right knee M25 561                   Subjective: Pt denies pain, "just a little stiffness " He reports that overall he fatigues after therapy however denies any pain  HE reports his CPM is at 75 degrees  Objective: See treatment diary below  R knee flexion PROM to 90 degrees  Intiiated gait training with SPC  Assessment: Tolerated treatment well  Patient demo excellent progression in peotocol and with ROM with respect to protocol  Good technique with SPC today  Comfort level remains low with SPC, pt prefers RW  Plan: Continue per plan of care  Precautions: Standard  PROTOCOL  Phase II do not exceed 90 deg until 20  Brace remains locked in full extension through Phase II        Manuals 20         Visit 1 Visit 2 Visit 3 Visit 4 Visit 5        PROM    KT KT        Patellar mobs    KT KT                     Neuro Re-Ed                                                                                                        Ther Ex             QS 2x10 2x10 20x 30x 40x        Ankle Pumps 20x Heel raises 2x10 rev rev Rev        Hamstring str w SOS 5x10s 5x10s 5x 10s 5 x 10s 5x 10s        Glute sets 2x10 Rev rev rev rev        Hip SLR  Standing Flex, Abd, Add Ext 2x10 ea Standing reviewed 10x ea 10x ea 10x2 e        Heel slides  10x with SOS 20x supine with SOS, seated wihtout SOS 20x 30x ea 30x ea        Heel prop   5'                                                               Ther Activity                                       Gait Training              With RW in clinic, vc's With RW, stair mgmt rev // bars gait fwd/lat with 1 UE support 3 laps ea 3 laps ea [facility] throughout clinic with verbal cues Modalities

## 2020-07-29 ENCOUNTER — OFFICE VISIT (OUTPATIENT)
Dept: PHYSICAL THERAPY | Facility: CLINIC | Age: 65
End: 2020-07-29
Payer: MEDICARE

## 2020-07-29 DIAGNOSIS — M25.561 ACUTE PAIN OF RIGHT KNEE: Primary | ICD-10-CM

## 2020-07-29 DIAGNOSIS — S76.111D QUADRICEPS TENDON RUPTURE, RIGHT, SUBSEQUENT ENCOUNTER: ICD-10-CM

## 2020-07-29 PROCEDURE — 97530 THERAPEUTIC ACTIVITIES: CPT | Performed by: PHYSICAL THERAPIST

## 2020-07-29 PROCEDURE — 97110 THERAPEUTIC EXERCISES: CPT | Performed by: PHYSICAL THERAPIST

## 2020-07-29 NOTE — PROGRESS NOTES
Daily Note     Today's date: 2020  Patient name: Johann Espino  : 1955  MRN: [id number]  Referring provider: Sharmin Field  Dx:   Encounter Diagnosis     ICD-10-CM    1  Acute pain of right knee M25 561    2  Quadriceps tendon rupture, right, subsequent encounter S76 111D        Start Time: 09  Stop Time: 1010  Total time in clinic (min): 40 minutes    Subjective: Pt reports he is able to change CPM to 80 without discomfort  Pt denies pain in knee, tightness noticed in R hamstring  Pt is looking forward to going camping in a few weeks, he has made modifications ot avoid breaking protocol on said trip  Objective: See treatment diary below  Heel slides PROM 85 degrees using SOS, pain free  Assessment: Tolerated treatment well  Patient demo steady progression of ROm and general activity tolerance at this time with good motivation and good undertsanidng and self pacing of HEP  Plan: Continue per plan of care  Precautions: Standard  PROTOCOL  Phase II do not exceed 90 deg until 20  Brace remains locked in full extension through Phase II        Manuals 20        Visit 1 Visit 2 Visit 3 Visit 4 Visit 5 Visit 6       PROM    KT KT KT       Patellar mobs    KT KT KT                    Neuro Re-Ed                                                                                                        Ther Ex             QS 2x10 2x10 20x 30x 40x 30x       Ankle Pumps 20x Heel raises 2x10 rev rev Rev 20x        Hamstring str w SOS 5x10s 5x10s 5x 10s 5 x 10s 5x 10s manual       Glute sets 2x10 Rev rev rev rev rev       Hip SLR  Standing Flex, Abd, Add Ext 2x10 ea Standing reviewed 10x ea 10x ea 10x2 e 10 x 2ea       Heel slides  10x with SOS 20x supine with SOS, seated wihtout SOS 20x 30x ea 30x ea 30x ea       Heel prop   5'    5'                                                           Ther Activity                                       Gait Training With RW in clinic, vc's With RW, stair mgmt rev // bars gait fwd/lat with 1 UE support 3 laps ea 3 laps ea [facility] throughout clinic with verbal cues // bar fwd/lat/bkwds 5x ea                    Modalities

## 2020-07-30 ENCOUNTER — APPOINTMENT (OUTPATIENT)
Dept: PHYSICAL THERAPY | Facility: CLINIC | Age: 65
End: 2020-07-30
Payer: MEDICARE

## 2020-07-31 ENCOUNTER — TELEPHONE (OUTPATIENT)
Dept: UROLOGY | Facility: MEDICAL CENTER | Age: 65
End: 2020-07-31

## 2020-07-31 NOTE — TELEPHONE ENCOUNTER
Patient had [address] on 7/21/2020     IMPRESSION:     1   Bilateral renal cysts      2   10 mm nonobstructing calculus in the lower pole of the right kidney        Will route to provider for appropriate follow up plan

## 2020-07-31 NOTE — TELEPHONE ENCOUNTER
New Patient Please Triage  Wife calling to get new patient appointment please review cat scan 10 mm non obstruction stone  Patient wife requesting if possible appointment in about 1 - 2 weeks due to husbands current condition  What is the reason for the patients appointment? Kidney stone       Do we accept the patient's insurance or is the patient Self-Pay?medicare a n b      Has the patient had any previous urologist(s)? 2011 Dr Orville Renee     Have patient records been requested?     Has the patient had any outside testing done? no       What is the patients location preference for an office visit? [place]        Does the patient have a personal history of cancer? no

## 2020-08-03 ENCOUNTER — TELEPHONE (OUTPATIENT)
Dept: OBGYN CLINIC | Facility: HOSPITAL | Age: 65
End: 2020-08-03

## 2020-08-03 NOTE — TELEPHONE ENCOUNTER
Wife Yana Trimble is calling back regarding the message above  Her  cannot take aspirin, as he has polycystic kidney disease      Callback EN#[phone] (ok to leave message on machine)

## 2020-08-03 NOTE — TELEPHONE ENCOUNTER
Yana Dose, patient's wife is calling   [phone]    Yana Trimble is calling because patient only has 3 injections of enoxaparin & she would like to know if he is to continue  Patient had sx on 7/2/20  Patient is up & around at this time  He sits a lot but not all day  Please call Yana Trimble to let her know

## 2020-08-04 ENCOUNTER — OFFICE VISIT (OUTPATIENT)
Dept: PHYSICAL THERAPY | Facility: CLINIC | Age: 65
End: 2020-08-04
Payer: MEDICARE

## 2020-08-04 DIAGNOSIS — M25.561 ACUTE PAIN OF RIGHT KNEE: ICD-10-CM

## 2020-08-04 DIAGNOSIS — S76.111D QUADRICEPS TENDON RUPTURE, RIGHT, SUBSEQUENT ENCOUNTER: Primary | ICD-10-CM

## 2020-08-04 PROCEDURE — 97530 THERAPEUTIC ACTIVITIES: CPT | Performed by: PHYSICAL THERAPIST

## 2020-08-04 PROCEDURE — 97110 THERAPEUTIC EXERCISES: CPT | Performed by: PHYSICAL THERAPIST

## 2020-08-04 NOTE — PROGRESS NOTES
Daily Note     Today's date: 2020  Patient name: Eulalia Johnson  : 1955  MRN: [id number]  Referring provider: Gurpreet Burnham  Dx:   Encounter Diagnosis     ICD-10-CM    1  Quadriceps tendon rupture, right, subsequent encounter  S76 111D    2  Acute pain of right knee  M25 561        Start Time: 1015  Stop Time: 1100  Total time in clinic (min): 45 minutes    Subjective: Pt reports he is doing well and finding he is moving with greater ease during stretching  CPM was taken from his home last week  Pt denies any pain  Objective: See treatment diary below  R knee PROM 85 degrees flexion  Swelling remains in R knee, boggy end feel patellar mobilizations  Assessment: Tolerated treatment well  Patient demo ongoing progression wtih respect to protocol with good response at this time  Pt is also dmeonstrating good progression toward LRAD  Plan: Continue per plan of care  Precautions: Standard  PROTOCOL  Phase II do not exceed 90 deg until 20  Brace remains locked in full extension through Phase II        Manuals 20       Visit 1 Visit 2 Visit 3 Visit 4 Visit 5 Visit 6 Visit 7      PROM    KT KT KT Kt      Patellar mobs    KT KT KT KT                   Neuro Re-Ed                                                                                                        Ther Ex             QS 2x10 2x10 20x 30x 40x 30x 40x      Ankle Pumps 20x Heel raises 2x10 rev rev Rev 20x  20x      Hamstring str w SOS 5x10s 5x10s 5x 10s 5 x 10s 5x 10s manual manual      Glute sets 2x10 Rev rev rev rev rev rev      Hip SLR  Standing Flex, Abd, Add Ext 2x10 ea Standing reviewed 10x ea 10x ea 10x2 e 10 x 2ea 10x2 ea      Heel slides  10x with SOS 20x supine with SOS, seated wihtout SOS 20x 30x ea 30x ea 30x ea 30x      Heel prop   5'    5' 5'                                                          Ther Activity                                       Gait Training With RW in clinic, vc's With RW, stair mgmt rev // bars gait fwd/lat with 1 UE support 3 laps ea 3 laps ea [facility] throughout clinic with verbal cues // bar fwd/lat/bkwds 5x ea SPC GT throughout clinic                   Modalities

## 2020-08-06 ENCOUNTER — OFFICE VISIT (OUTPATIENT)
Dept: PHYSICAL THERAPY | Facility: CLINIC | Age: 65
End: 2020-08-06
Payer: MEDICARE

## 2020-08-06 DIAGNOSIS — M25.561 ACUTE PAIN OF RIGHT KNEE: ICD-10-CM

## 2020-08-06 DIAGNOSIS — S76.111D QUADRICEPS TENDON RUPTURE, RIGHT, SUBSEQUENT ENCOUNTER: Primary | ICD-10-CM

## 2020-08-06 DIAGNOSIS — I10 ESSENTIAL HYPERTENSION: ICD-10-CM

## 2020-08-06 PROCEDURE — 97110 THERAPEUTIC EXERCISES: CPT | Performed by: PHYSICAL THERAPIST

## 2020-08-06 NOTE — PROGRESS NOTES
Daily Note     Today's date: 2020  Patient name: Keith Lyons  : 1955  MRN: [id number]  Referring provider: Cheikh George  Dx:   Encounter Diagnosis     ICD-10-CM    1  Quadriceps tendon rupture, right, subsequent encounter  S76 111D    2  Acute pain of right knee  M25 561                   Subjective: "I am doing really great, my left leg is a big stiff but otherwise I'm doing great " Pt denies any pain  PT reports he notices "near buckling" episodes 2x/week on average while walking however always has his walk or crutch and does not feel he is at risk of fall but does acknowledges concern  Objective: See treatment diary below  L knee PROM flexion to 90 deg  Assessment: Tolerated treatment well  Patient demo excellent progress toward est LTGs with respect to protocol  PT demo improving indep with gait using LRAD  Plan: Continue per plan of care  Precautions: Standard  PROTOCOL  Phase II do not exceed 90 deg until 20  Brace remains locked in full extension through Phase II        Manuals 20      Visit 1 Visit 2 Visit 3 Visit 4 Visit 5 Visit 6 Visit 7 Visit 8     PROM    KT KT KT Kt KT     Patellar mobs    KT KT KT KT KT                  Neuro Re-Ed                                                                                                        Ther Ex             QS 2x10 2x10 20x 30x 40x 30x 40x 40x     Ankle Pumps 20x Heel raises 2x10 rev rev Rev 20x  20x 20x     Hamstring str w SOS 5x10s 5x10s 5x 10s 5 x 10s 5x 10s manual manual manual     Glute sets 2x10 Rev rev rev rev rev rev rev     Hip SLR  Standing Flex, Abd, Add Ext 2x10 ea Standing reviewed 10x ea 10x ea 10x2 e 10 x 2ea 10x2 ea Standing 2x10 ea     Heel slides  10x with SOS 20x supine with SOS, seated wihtout SOS 20x 30x ea 30x ea 30x ea 30x 30-40x ea     Heel prop   5'    5' 5' 5'                                                         Ther Activity Gait Training              With RW in clinic, vc's With RW, stair mgmt rev // bars gait fwd/lat with 1 UE support 3 laps ea 3 laps ea [facility] throughout clinic with verbal cues // bar fwd/lat/bkwds 5x ea SPC GT throughout clinic Rev                  Modalities

## 2020-08-07 RX ORDER — METOPROLOL SUCCINATE 50 MG/1
75 TABLET, EXTENDED RELEASE ORAL DAILY
Qty: 135 TABLET | Refills: 0 | Status: SHIPPED | OUTPATIENT
Start: 2020-08-07 | End: 2020-11-08 | Stop reason: SDUPTHER

## 2020-08-07 RX ORDER — LISINOPRIL 5 MG/1
5 TABLET ORAL DAILY
Qty: 90 TABLET | Refills: 0 | Status: SHIPPED | OUTPATIENT
Start: 2020-08-07 | End: 2020-11-08 | Stop reason: SDUPTHER

## 2020-08-10 ENCOUNTER — OFFICE VISIT (OUTPATIENT)
Dept: PHYSICAL THERAPY | Facility: CLINIC | Age: 65
End: 2020-08-10
Payer: MEDICARE

## 2020-08-10 DIAGNOSIS — S76.111D QUADRICEPS TENDON RUPTURE, RIGHT, SUBSEQUENT ENCOUNTER: Primary | ICD-10-CM

## 2020-08-10 PROCEDURE — 97110 THERAPEUTIC EXERCISES: CPT

## 2020-08-10 NOTE — PROGRESS NOTES
Daily Note     Today's date: 8/10/2020  Patient name: Shana Blount  : 1955  MRN: [id number]  Referring provider: Benny Joshi  Dx:   Encounter Diagnosis     ICD-10-CM    1  Quadriceps tendon rupture, right, subsequent encounter  S76 111D        Start Time: 1100  Stop Time: 1145  Total time in clinic (min): 45 minutes    Subjective: Patient denies pain right knee; feels he is getting good motion; no mention of near buckling this session ;  sees the Dr  On 20      Objective: See treatment diary below      Assessment: Tolerated treatment well  Patient denied pain with therex; good patella mobility & passive flexion; Patient would benefit from continued PT      Plan: Continue per plan of care  Precautions: Standard  PROTOCOL  Phase II do not exceed 90 deg until 20  Brace remains locked in full extension through Phase II        Manuals 8/10  7/20 7/23 7/27 7/29 8/4 8/6      Visit 9  Visit 3 Visit 4 Visit 5 Visit 6 Visit 7 Visit 8     PROM MaD   KT KT KT Kt KT     Patellar mobs maD   KT KT KT KT KT                  Neuro Re-Ed                                                                                                        Ther Ex             QS 2x10 2x10 20x 30x 40x 30x 40x 40x     Ankle Pumps 20x Heel raises 2x10 rev rev Rev 20x  20x 20x     Hamstring str w SOS 10 x 10s w SOS 5x10s 5x 10s 5 x 10s 5x 10s manual manual manual     Glute sets 2x10 Rev rev rev rev rev rev rev     Hip SLR Stance 2 x 10 ea flex, abd, add, ext  Standing Flex, Abd, Add Ext 2x10 ea Standing reviewed 10x ea 10x ea 10x2 e 10 x 2ea 10x2 ea Standing 2x10 ea     Heel slides X 30 ea w peanut  10x with SOS 20x supine with SOS, seated wihtout SOS 20x 30x ea 30x ea 30x ea 30x 30-40x ea     Heel prop 5'  5'    5' 5' 5'                                                         Ther Activity                                       Gait Training               With RW, stair mgmt rev // bars gait fwd/lat with 1 UE support 3 laps ea 3 laps ea [facility] throughout clinic with verbal cues // bar fwd/lat/bkwds 5x ea [facility] throughout clinic Rev                  Modalities

## 2020-08-13 ENCOUNTER — OFFICE VISIT (OUTPATIENT)
Dept: PHYSICAL THERAPY | Facility: CLINIC | Age: 65
End: 2020-08-13
Payer: MEDICARE

## 2020-08-13 DIAGNOSIS — M25.561 ACUTE PAIN OF RIGHT KNEE: ICD-10-CM

## 2020-08-13 DIAGNOSIS — S76.111D QUADRICEPS TENDON RUPTURE, RIGHT, SUBSEQUENT ENCOUNTER: Primary | ICD-10-CM

## 2020-08-13 PROCEDURE — 97110 THERAPEUTIC EXERCISES: CPT | Performed by: PHYSICAL THERAPIST

## 2020-08-13 NOTE — PROGRESS NOTES
PT Re-Evaluation     Today's date: 2020  Patient name: Santiago Baldwin  : 1955  MRN: [id number]  Referring provider: Royce Davidson  Dx:   Encounter Diagnosis     ICD-10-CM    1  Quadriceps tendon rupture, right, subsequent encounter  S76 111D    2  Acute pain of right knee  M25 561        Start Time: 1015  Stop Time: 1100  Total time in clinic (min): 45 minutes    Assessment  Assessment details: Santiago Baldwin is a 77 y o  male who has been regularly participating in skilled Pt since time of IE with respect to protocol and at this time he demo good potential and good progression toward Phase appropriate goals  At this time pt remains well below his PLOF with relation to strength, activity tolerance and ROM as well as general community participation however is well motivated  He would benefit from ongoing skilled OPPT to maximize return to PLOF  Impairments: abnormal gait, abnormal or restricted ROM, activity intolerance, impaired physical strength, lacks appropriate home exercise program, pain with function, weight-bearing intolerance, poor posture  and poor body mechanics  Understanding of Dx/Px/POC: good   Prognosis: good    Goals  Short Term Goals to be accomplished in 6 weeks: - partially met/ongoing  STG1: Pt will be I with HEP  STG2: Pt will demo 50% inc in knee PROM  STG3: Pt will demo 1/2 MMT strength in knee   STG4: Pt will amb community distance with LRAD without pain increase     Long Term Goals to be accomplished in 12 weeks: -ongoing  LTG1: Pt will demo knee strength WFL to return to PLOF  LTG2: Pt will demo knee AROM WNL to minimize gait deviations  LTG3: Pt will return to household ADLs and law duties pain free as per PLOF      Plan  Plan details:  HEP development, stretching, strengthening, A/AA/PROM, joint mobilizations, posture education, STM/MI as needed to reduce muscle tension, muscle reeducation, PLOC discussed and agreed upon with patient        Patient would benefit from: PT eval and skilled physical therapy  Planned modality interventions: cryotherapy and thermotherapy: hydrocollator packs  Planned therapy interventions: manual therapy, neuromuscular re-education, self care, therapeutic activities, therapeutic exercise and home exercise program  Frequency: 2x week (2-3x/week)  Duration in weeks: 6  Treatment plan discussed with: patient        Subjective Evaluation    History of Present Illness  Mechanism of injury: Pt reports he is not having pain, only notices some fatigue with exercise however is overall very pleased with his progress and his level of comfort  He is looking forward to being able ot function wihtout the brace  He continues to keep the RW with him however reports he has progressed from using [facility] in house to no AD in house more lately  AC used for stair management ongoing in home  Quality of life: good    Pain  Current pain ratin  At best pain ratin  At worst pain ratin          Objective     Observations     Additional Observation Details  Scar healing well, swelling ongoing suprapatellar region    Active Range of Motion   Left Knee   Normal active range of motion    Additional Active Range of Motion Details  NT due to PROTOCOL restrictions    Passive Range of Motion   Left Knee   Normal passive range of motion    Right Knee   Flexion: 90 degrees   Extension: 0 degrees     Strength/Myotome Testing     Additional Strength Details  L LE strength WFL  R Hip MMT 5/5 throughout  R knee MMT NT due to PROTOCOL  R ankle MMT 5/5 throughout      General Comments:      Knee Comments  Function:    Xfers: I wihtout UE, modification to avoid knee flexion    Bed mob: I without assist R LE during sup/sit      Gait: Amb with Rw, Amb without AD with gait deviations due to brace    Stairs: Step to pattern with AC and rail, locked Brace               Precautions: Standard      Manuals 8/10  7/20 7/23 7/27 7/29 8/4 8/6 8/13     Visit 9  Visit 3 Visit 4 Visit 5 Visit 6 Visit 7 Visit 8 Visit 9    PROM MaD   KT KT KT Kt KT KT    Patellar mobs maD   KT KT KT KT KT KT                 Neuro Re-Ed                                                                                                        Ther Ex             QS 2x10 2x10 20x 30x 40x 30x 40x 40x 40x    Ankle Pumps 20x Heel raises 2x10 rev rev Rev 20x  20x 20x 20x    Hamstring str w SOS 10 x 10s w SOS 5x10s 5x 10s 5 x 10s 5x 10s manual manual manual manual    Glute sets 2x10 Rev rev rev rev rev rev rev rev    Hip SLR Stance 2 x 10 ea flex, abd, add, ext  Standing Flex, Abd, Add Ext 2x10 ea Standing reviewed 10x ea 10x ea 10x2 e 10 x 2ea 10x2 ea Standing 2x10 ea Standing 3x10    Heel slides X 30 ea w peanut  10x with SOS 20x supine with SOS, seated wihtout SOS 20x 30x ea 30x ea 30x ea 30x 30-40x ea 30x    Heel prop 5'  5'    5' 5' 5' 5'                                                        Ther Activity                                       Gait Training               With RW, stair mgmt rev // bars gait fwd/lat with 1 UE support 3 laps ea 3 laps ea [facility]PC throughout clinic with verbal cues // bar fwd/lat/bkwds 5x ea SPC GT throughout clinic Rev rev                 Modalities

## 2020-08-14 ENCOUNTER — OFFICE VISIT (OUTPATIENT)
Dept: OBGYN CLINIC | Facility: CLINIC | Age: 65
End: 2020-08-14

## 2020-08-14 VITALS — TEMPERATURE: 97.9 F | DIASTOLIC BLOOD PRESSURE: 80 MMHG | HEART RATE: 54 BPM | SYSTOLIC BLOOD PRESSURE: 129 MMHG

## 2020-08-14 DIAGNOSIS — S76.111D QUADRICEPS TENDON RUPTURE, RIGHT, SUBSEQUENT ENCOUNTER: Primary | ICD-10-CM

## 2020-08-14 PROCEDURE — 1160F RVW MEDS BY RX/DR IN RCRD: CPT | Performed by: ORTHOPAEDIC SURGERY

## 2020-08-14 PROCEDURE — 3079F DIAST BP 80-89 MM HG: CPT | Performed by: ORTHOPAEDIC SURGERY

## 2020-08-14 PROCEDURE — 3074F SYST BP LT 130 MM HG: CPT | Performed by: ORTHOPAEDIC SURGERY

## 2020-08-14 PROCEDURE — 99024 POSTOP FOLLOW-UP VISIT: CPT | Performed by: ORTHOPAEDIC SURGERY

## 2020-08-14 PROCEDURE — 4040F PNEUMOC VAC/ADMIN/RCVD: CPT | Performed by: ORTHOPAEDIC SURGERY
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
## 2020-08-18 ENCOUNTER — OFFICE VISIT (OUTPATIENT)
Dept: PHYSICAL THERAPY | Facility: CLINIC | Age: 65
End: 2020-08-18
Payer: MEDICARE

## 2020-08-18 DIAGNOSIS — M25.561 ACUTE PAIN OF RIGHT KNEE: ICD-10-CM

## 2020-08-18 DIAGNOSIS — S76.111D QUADRICEPS TENDON RUPTURE, RIGHT, SUBSEQUENT ENCOUNTER: Primary | ICD-10-CM

## 2020-08-18 PROCEDURE — 97110 THERAPEUTIC EXERCISES: CPT | Performed by: PHYSICAL THERAPIST

## 2020-08-18 NOTE — PROGRESS NOTES
Daily Note     Today's date: 2020  Patient name: Darren Núñez  : 1955  MRN: [id number]  Referring provider: Lucio Champion  Dx:   Encounter Diagnosis     ICD-10-CM    1  Quadriceps tendon rupture, right, subsequent encounter  S76 111D    2  Acute pain of right knee  M25 561        Start Time: 1015  Stop Time: 1100  Total time in clinic (min): 45 minutes    Subjective: Pt denies pain however acknowledges fatigue with progression of exercises today  He reports he feels tightness with heel slide  He reports he is not sing AD in his home aside from using it to go to the bathroom at night when he is not wearing his brace in bed  Objective: See treatment diary below  R knee heel slide 99 degrees  Progress therex per protocol      Assessment: Tolerated treatment well  Patient demoongoing deficit related to post op status however pt demo very good prognosis and good potential for progression toward est LTGs  At this itme pt demo dec act tolerane however strength has yet to be intiiated per protocol  Plan: Continue per plan of care  Precautions: Standard  PROTOCOL Phase III 20, Contraindications: stairs, inclined walk, squat, lunge  Wean from brace  PROM to 110 deg flexion, AROM full         Manuals 8/10  7/20 7/23 7/27 7/29 8/4 8/6 8/13 8/18    Visit 9  Visit 3 Visit 4 Visit 5 Visit 6 Visit 7 Visit 8 Visit 9 Visit 10   PROM MaD   KT KT KT Kt KT KT    Patellar mobs maD   KT KT KT KT KT KT KT                Neuro Re-Ed                                                                                           Active w/u          NuStep 5' L1   Ther Ex             QS 2x10 2x10 20x 30x 40x 30x 40x 40x 40x 30x   Ankle Pumps 20x Heel raises 2x10 rev rev Rev 20x  20x 20x 20x 30x   Hamstring str w SOS 10 x 10s w SOS 5x10s 5x 10s 5 x 10s 5x 10s manual manual manual manual manual   Glute sets 2x10 Rev rev rev rev rev rev rev rev rev   Hip SLR Stance 2 x 10 ea flex, abd, add, ext  Standing Flex, Abd, Add Ext 2x10 ea Standing reviewed 10x ea 10x ea 10x2 e 10 x 2ea 10x2 ea Standing 2x10 ea Standing 3x10 Standing 3x10 1lb   Heel slides X 30 ea w peanut  10x with SOS 20x supine with SOS, seated wihtout SOS 20x 30x ea 30x ea 30x ea 30x 30-40x ea 30x 30x   Heel prop 5'  5'    5' 5' 5' 5' HEP   SAQ          2x10   SLR Supine          2x5                             Ther Activity                                       Gait Training               With RW, stair mgmt rev // bars gait fwd/lat with 1 UE support 3 laps ea 3 laps ea [facility] throughout clinic with verbal cues // bar fwd/lat/bkwds 5x ea [facility] GT throughout clinic Rev rev Rev                Modalities                       Ice 5'

## 2020-08-20 ENCOUNTER — OFFICE VISIT (OUTPATIENT)
Dept: PHYSICAL THERAPY | Facility: CLINIC | Age: 65
End: 2020-08-20
Payer: MEDICARE

## 2020-08-20 DIAGNOSIS — M25.561 ACUTE PAIN OF RIGHT KNEE: Primary | ICD-10-CM

## 2020-08-20 DIAGNOSIS — S76.111D QUADRICEPS TENDON RUPTURE, RIGHT, SUBSEQUENT ENCOUNTER: ICD-10-CM

## 2020-08-20 PROCEDURE — 97110 THERAPEUTIC EXERCISES: CPT | Performed by: PHYSICAL THERAPIST

## 2020-08-20 NOTE — PROGRESS NOTES
Daily Note     Today's date: 2020  Patient name: Queen Jim  : 1955  MRN: [id number]  Referring provider: Jerri Guzmán  Dx:   Encounter Diagnosis     ICD-10-CM    1  Acute pain of right knee  M25 561    2  Quadriceps tendon rupture, right, subsequent encounter  S76 111D        Start Time: 5  Stop Time: 0500  Total time in clinic (min): 45 minutes    Subjective: Pt denies any pain or adverse effects after last session's progression, does report he felt fatigued though  Today he denies pain  Pt is pleased he can easily transfer in and out of front seat now he is very pleased  Objective: See treatment diary below  R knee heel slide flexion 99 deg  Assessment: Tolerated treatment well  Patient demo dec act tolerance however is consistent with post op status at this time  Pt demo steady porgression through protocol with ongoing ROM progression  Plan: Continue per plan of care  Precautions: Standard  PROTOCOL Phase III 20, Contraindications: stairs, inclined walk, squat, lunge  Wean from brace  PROM to 110 deg flexion, AROM full         Manuals     Visit 11  Visit 3 Visit 4 Visit 5 Visit 6 Visit 7 Visit 8 Visit 9 Visit 10   PROM    KT KT KT Kt KT KT    Patellar mobs    KT KT KT KT KT KT KT                Neuro Re-Ed                                                                                           Active w/u NuStep L2 5'         NuStep 5' L1   Ther Ex             QS 30x 2x10 20x 30x 40x 30x 40x 40x 40x 30x   Ankle Pumps Heel raise 1lb 2x10 Heel raises 2x10 rev rev Rev 20x  20x 20x 20x 30x   Hamstring str w SOS manual 5x10s 5x 10s 5 x 10s 5x 10s manual manual manual manual manual   Glute sets  Rev rev rev rev rev rev rev rev rev   Hip SLR 1lb 4 way standing Standing Flex, Abd, Add Ext 2x10 ea Standing reviewed 10x ea 10x ea 10x2 e 10 x 2ea 10x2 ea Standing 2x10 ea Standing 3x10 Standing 3x10 1lb   Heel slides 30x 10x with SOS 20x supine with SOS, seated wihtout SOS 20x 30x ea 30x ea 30x ea 30x 30-40x ea 30x 30x   Heel prop HEP  5'    5' 5' 5' 5' HEP   SAQ 2x10         2x10   SLR Supine 2x5         2x5   BKFO Green Tband x10                                                                 Ther Activity                                       Gait Training               With RW, stair mgmt rev // bars gait fwd/lat with 1 UE support 3 laps ea 3 laps [facility] throughout clinic with verbal cues // bar fwd/lat/bkwds 5x ea SPC GT throughout clinic Rev rev Rev                Modalities              Ice 5'         Ice 5'

## 2020-08-25 ENCOUNTER — OFFICE VISIT (OUTPATIENT)
Dept: PHYSICAL THERAPY | Facility: CLINIC | Age: 65
End: 2020-08-25
Payer: MEDICARE

## 2020-08-25 DIAGNOSIS — S76.111D QUADRICEPS TENDON RUPTURE, RIGHT, SUBSEQUENT ENCOUNTER: ICD-10-CM

## 2020-08-25 DIAGNOSIS — M25.561 ACUTE PAIN OF RIGHT KNEE: Primary | ICD-10-CM

## 2020-08-25 PROCEDURE — 97110 THERAPEUTIC EXERCISES: CPT | Performed by: PHYSICAL THERAPIST

## 2020-08-25 NOTE — PROGRESS NOTES
Daily Note     Today's date: 2020  Patient name: Eulalia Johnson  : 1955  MRN: [id number]  Referring provider: Gurpreet Burnham  Dx:   Encounter Diagnosis     ICD-10-CM    1  Acute pain of right knee  M25 561    2  Quadriceps tendon rupture, right, subsequent encounter  S76 111D        Start Time: 1010  Stop Time: 1100  Total time in clinic (min): 50 minutes    Subjective: Pt reports his calf is a little sore for the last few days, he has returned to driving successfully over the past weekend as well  Pt denies knee pain  Objective: See treatment diary below  Progress NuStep resistance and Ankle weight progressed per protocol  R knee AAROM flexion 105 deg, AROM knee flexion 105 deg  Assessment: Tolerated treatment well  Patient acknowledges fatigue post treatment and denies pain, he demo good progress through est protocol  Plan: Continue per plan of care  Precautions: Standard  PROTOCOL Phase III 20, Contraindications: stairs, inclined walk, squat, lunge  Wean from brace  PROM to 110 deg flexion, AROM full         Manuals     Visit 11 Visit 12 Visit 3 Visit 4 Visit 5 Visit 6 Visit 7 Visit 8 Visit 9 Visit 10   PROM    KT KT KT Kt KT KT    Patellar mobs    KT KT KT KT KT KT KT                Neuro Re-Ed                                                                                           Active w/u NuStep L2 5' NuStep 10' L2-3        NuStep 5' L1   Ther Ex             QS 30x 20x 20x 30x 40x 30x 40x 40x 40x 30x   Ankle Pumps Heel raise 1lb 2x10 2lbs 20x rev rev Rev 20x  20x 20x 20x 30x   Hamstring str w SOS manual  5x 10s 5 x 10s 5x 10s manual manual manual manual manual   Glute sets  DC rev rev rev rev rev rev rev rev   Hip SLR 1lb 4 way standing 2lbs 4 way standing 2x10 ea Standing reviewed 10x ea 10x ea 10x2 e 10 x 2ea 10x2 ea Standing 2x10 ea Standing 3x10 Standing 3x10 1lb   Heel slides 30x 20x 20x supine with SOS, seated wihtout SOS 20x 30x ea 30x ea 30x ea 30x 30-40x ea 30x 30x   Heel prop HEP  5'    5' 5' 5' 5' HEP   SAQ 2x10 1lb 2x10        2x10   SLR Supine 2x5 10x        2x5   BKFO Green Tband x10  2x10            Wall squats             TG             Lunges             Prone Ham curls                                                    Ther Activity                                       Gait Training                // bars gait fwd/lat with 1 UE support 3 laps ea 3 laps [facility] throughout clinic with verbal cues // bar fwd/lat/bkwds 5x ea SPC GT throughout clinic Rev rev Rev                Modalities              Ice 5' 5'        Ice 5'

## 2020-08-27 ENCOUNTER — OFFICE VISIT (OUTPATIENT)
Dept: PHYSICAL THERAPY | Facility: CLINIC | Age: 65
End: 2020-08-27
Payer: MEDICARE

## 2020-08-27 DIAGNOSIS — M25.561 ACUTE PAIN OF RIGHT KNEE: Primary | ICD-10-CM

## 2020-08-27 DIAGNOSIS — S76.111D QUADRICEPS TENDON RUPTURE, RIGHT, SUBSEQUENT ENCOUNTER: ICD-10-CM

## 2020-08-27 PROCEDURE — 97110 THERAPEUTIC EXERCISES: CPT | Performed by: PHYSICAL THERAPIST

## 2020-08-27 NOTE — PROGRESS NOTES
Daily Note     Today's date: 2020  Patient name: Edilia Franklin  : 1955  MRN: [id number]  Referring provider: Leonardo Found  Dx:   Encounter Diagnosis     ICD-10-CM    1  Acute pain of right knee  M25 561    2  Quadriceps tendon rupture, right, subsequent encounter  S76 111D        Start Time: 1010  Stop Time: 1100  Total time in clinic (min): 50 minutes    Subjective: Pt reports fatigue post treatment last session however denies adverse effects  He inquires about ongoing swelling in knee  Objective: See treatment diary below  Pt ab without AD, continue to ab with brace unlocked  R knee flexion AAROM 105 deg      Assessment: Tolerated treatment well  Patient demo excllent progression and otivation through today's therex  ROm demo slow return however steayd roger protocol  Plan: Continue per plan of care  Precautions: Standard  PROTOCOL Phase III 20, Contraindications: stairs, inclined walk, squat, lunge  Wean from brace  PROM to 110 deg flexion, AROM full         Manuals            Visit 11 Visit 12 Visit 13          PROM   KT          Patellar mobs                          Neuro Re-Ed                                                                                           Active w/u NuStep L2 5' NuStep 10' L2-3 NuStep 10' L3          Ther Ex             QS 30x 20x 25x          Ankle Pumps Heel raise 1lb 2x10 2lbs 20x 2lbs 20x          Hamstring str w SOS manual  manual          Glute sets  DC           Hip SLR 1lb 4 way standing 2lbs 4 way standing 2x10 ea 2lbs 4 way 2x10 ea          Heel slides 30x 20x 20x          Heel prop HEP  With QS          SAQ 2x10 1lb 2x10 2lb 2x10          SLR Supine 2x5 10x 2x10          BKFO Green Tband x10  2x10  Blue 2x10          Wall squats             TG   NV          Lunges             Prone Ham curls   NV                                                 Ther Activity                                       Gait Training Modalities              Ice 5' 5' 5'

## 2020-09-01 ENCOUNTER — OFFICE VISIT (OUTPATIENT)
Dept: PHYSICAL THERAPY | Facility: CLINIC | Age: 65
End: 2020-09-01
Payer: MEDICARE

## 2020-09-01 DIAGNOSIS — M25.561 ACUTE PAIN OF RIGHT KNEE: Primary | ICD-10-CM

## 2020-09-01 DIAGNOSIS — S76.111D QUADRICEPS TENDON RUPTURE, RIGHT, SUBSEQUENT ENCOUNTER: ICD-10-CM

## 2020-09-01 PROCEDURE — 97110 THERAPEUTIC EXERCISES: CPT | Performed by: PHYSICAL THERAPIST

## 2020-09-01 NOTE — PROGRESS NOTES
Daily Note     Today's date: 2020  Patient name: Diony Saenz  : 1955  MRN: [id number]  Referring provider: Teagan Sierra  Dx:   Encounter Diagnosis     ICD-10-CM    1  Acute pain of right knee  M25 561    2  Quadriceps tendon rupture, right, subsequent encounter  S76 111D        Start Time: 1015  Stop Time: 1100  Total time in clinic (min): 45 minutes    Subjective: Pt repots he is generally doing well, he notices stiffness in hamstring and over the knee with flexion based activities  No Pain  Objective: See treatment diary below  R knee AAROM Flexion 108 deg  Assessment: Tolerated treatment well  Patient demo fatigue post treatment and despite dec activity tolerance he demo good progression rodney protocol at this time  Plan: Continue per plan of care  Precautions: Standard  PROTOCOL Phase III 20, Contraindications: stairs, inclined walk, squat, lunge  Wean from brace  PROM to 110 deg flexion, AROM full         Manuals           Visit 11 Visit 12 Visit 13 Visit14         PROM   KT KT         Patellar mobs                          Neuro Re-Ed                                                                                           Active w/u NuStep L2 5' NuStep 10' L2-3 NuStep 10' L3 L3 10'         Ther Ex             QS 30x 20x 25x 20x         Ankle Pumps Heel raise 1lb 2x10 2lbs 20x 2lbs 20x 3lbs 20x         Hamstring str w SOS manual  manual manual         Glute sets  DC           Hip SLR 1lb 4 way standing 2lbs 4 way standing 2x10 ea 2lbs 4 way 2x10 ea 3lbs 20x ea         Heel slides 30x 20x 20x 20x         Heel prop HEP  With QS With QS         SAQ 2x10 1lb 2x10 2lb 2x10 2lbs 2x10         SLR Supine 2x5 10x 2x10 2x10         BKFO Green Tband x10  2x10  Blue 2x10 2x10         Wall squats    10x         TG   NV          Lunges             Prone Ham curls   NV Standing AROM 10x                                                Ther Activity Gait Training                                       Modalities              Ice 5' 5' 5' 5'

## 2020-09-03 ENCOUNTER — OFFICE VISIT (OUTPATIENT)
Dept: PHYSICAL THERAPY | Facility: CLINIC | Age: 65
End: 2020-09-03
Payer: MEDICARE

## 2020-09-03 DIAGNOSIS — M25.561 ACUTE PAIN OF RIGHT KNEE: Primary | ICD-10-CM

## 2020-09-03 DIAGNOSIS — S76.111D QUADRICEPS TENDON RUPTURE, RIGHT, SUBSEQUENT ENCOUNTER: ICD-10-CM

## 2020-09-03 PROCEDURE — 97110 THERAPEUTIC EXERCISES: CPT | Performed by: PHYSICAL THERAPIST

## 2020-09-03 NOTE — PROGRESS NOTES
Daily Note     Today's date: 9/3/2020  Patient name: Alonzo Garvin  : 1955  MRN: [id number]  Referring provider: Cassidy Elizabeth  Dx:   Encounter Diagnosis     ICD-10-CM    1  Acute pain of right knee  M25 561    2  Quadriceps tendon rupture, right, subsequent encounter  S76 111D        Start Time: 1015  Stop Time: 1105  Total time in clinic (min): 50 minutes    Subjective: Pt is pleased he was able to get his sneakers on today!" He reports he felt fatigued after last session "but it was worth it!"      Objective: See treatment diary below  Gait deviations consistent with brace  Swelling in R knee remains moderate, warmth (+) after inc activity  R knee AAROM flexion: 108 deg      Assessment: Tolerated treatment well  Patient fatigues with exertion however is well motivated  Pt able to correct extensor lag with vc for quad set prior to SLR  Plan: Continue per plan of care  Progress therex per protocol     Precautions: Standard  PROTOCOL Phase III 20, Contraindications: stairs, inclined walk, squat, lunge  Wean from brace  PROM to 110 deg flexion, AROM full         Manuals 8/20 8/25 8/27 9/1 9/3         Visit 11 Visit 12 Visit 13 Visit14 Visit 15        PROM   KT KT KT        Patellar mobs                          Neuro Re-Ed                                                                                           Active w/u NuStep L2 5' NuStep 10' L2-3 NuStep 10' L3 L3 10' L3 10'        Ther Ex             QS 30x 20x 25x 20x 25x        Ankle Pumps Heel raise 1lb 2x10 2lbs 20x 2lbs 20x 3lbs 20x 3lbs 20x        Hamstring str w SOS manual  manual manual manual        Glute sets  DC           Hip SLR 1lb 4 way standing 2lbs 4 way standing 2x10 ea 2lbs 4 way 2x10 ea 3lbs 20x ea 3lbs 20x        Heel slides 30x 20x 20x 20x 20x        Heel prop HEP  With QS With QS 25x        SAQ 2x10 1lb 2x10 2lb 2x10 2lbs 2x10 2x10        SLR Supine 2x5 10x 2x10 2x10 2x10        BKFO Green Tband x10  2x10  Blue 2x10 2x10 2x10        Wall squats    10x 10x        TG   NV  NV        Lunges     NV        Prone Ham curls   NV Standing AROM 10x 2x10                                               Ther Activity                                       Gait Training                                       Modalities              Ice 5' 5' 5' 5'

## 2020-09-08 ENCOUNTER — OFFICE VISIT (OUTPATIENT)
Dept: PHYSICAL THERAPY | Facility: CLINIC | Age: 65
End: 2020-09-08
Payer: MEDICARE

## 2020-09-08 DIAGNOSIS — M25.561 ACUTE PAIN OF RIGHT KNEE: Primary | ICD-10-CM

## 2020-09-08 DIAGNOSIS — S76.111D QUADRICEPS TENDON RUPTURE, RIGHT, SUBSEQUENT ENCOUNTER: ICD-10-CM

## 2020-09-08 PROCEDURE — 97110 THERAPEUTIC EXERCISES: CPT | Performed by: PHYSICAL THERAPIST

## 2020-09-08 NOTE — PROGRESS NOTES
Daily Note     Today's date: 2020  Patient name: Erica Wood  : 1955  MRN: [id number]  Referring provider: Kaylie House  Dx:   Encounter Diagnosis     ICD-10-CM    1  Acute pain of right knee  M25 561    2  Quadriceps tendon rupture, right, subsequent encounter  S76 111D        Start Time: 1015  Stop Time: 1100  Total time in clinic (min): 45 minutes    Subjective: Pt reports slight fatigue in LE with inc activity  Denies pain  Pt reports he awoke with a Kishore Horse last night in R calf, "still slightly sore there today "       Objective: See treatment diary below  L knee AAROM flexion 116 deg  Assessment: Tolerated treatment well  Patient demo excellent progression at this time toward protocol est goals  Plan: Continue per plan of care  Precautions: Standard  PROTOCOL Phase III 20, Contraindications: stairs, inclined walk, squat, lunge  Wean from brace  PROM to 110 deg flexion, AROM full         Manuals 8/20 8/25 8/27 9/1 9/3 9/8        Visit 11 Visit 12 Visit 13 Visit14 Visit 15 Visit 16       PROM   KT KT KT KT       Patellar mobs                          Neuro Re-Ed                                                                                           Active w/u NuStep L2 5' NuStep 10' L2-3 NuStep 10' L3 L3 10' L3 10' 10'       Ther Ex             QS 30x 20x 25x 20x 25x 25x       Ankle Pumps Heel raise 1lb 2x10 2lbs 20x 2lbs 20x 3lbs 20x 3lbs 20x 4lbs 20x       Hamstring str w SOS manual  manual manual manual        Glute sets  DC           Hip SLR 1lb 4 way standing 2lbs 4 way standing 2x10 ea 2lbs 4 way 2x10 ea 3lbs 20x ea 3lbs 20x 4lbs 20x ea       Heel slides 30x 20x 20x 20x 20x 20x       Heel prop HEP  With QS With QS 25x With QS       SAQ 2x10 1lb 2x10 2lb 2x10 2lbs 2x10 2x10 3lbs 2x10       SLR Supine 2x5 10x 2x10 2x10 2x10 20x       BKFO Green Tband x10  2x10  Blue 2x10 2x10 2x10 Black 2x10       Wall squats    10x 10x 10x       TG   NV  NV NV       Lunges NV 10x       Prone Ham curls   NV Standing AROM 10x 2x10 20x                                              Ther Activity                                       Gait Training                                       Modalities              Ice 5' 5' 5' 5'

## 2020-09-10 ENCOUNTER — OFFICE VISIT (OUTPATIENT)
Dept: PHYSICAL THERAPY | Facility: CLINIC | Age: 65
End: 2020-09-10
Payer: MEDICARE

## 2020-09-10 DIAGNOSIS — S76.111D QUADRICEPS TENDON RUPTURE, RIGHT, SUBSEQUENT ENCOUNTER: ICD-10-CM

## 2020-09-10 DIAGNOSIS — M25.561 ACUTE PAIN OF RIGHT KNEE: Primary | ICD-10-CM

## 2020-09-10 PROCEDURE — 97110 THERAPEUTIC EXERCISES: CPT | Performed by: PHYSICAL THERAPIST

## 2020-09-10 NOTE — PROGRESS NOTES
Daily Note     Today's date: 9/10/2020  Patient name: Marino Padron  : 1955  MRN: [id number]  Referring provider: Glynn Smith  Dx:   Encounter Diagnosis     ICD-10-CM    1  Acute pain of right knee  M25 561    2  Quadriceps tendon rupture, right, subsequent encounter  S76 111D        Start Time: 1015  Stop Time: 1100  Total time in clinic (min): 45 minutes    Subjective: Pt reports he feels he is generally doing well and is increasing lightly the activities he is doing at home without pain provocation  He acknowledges slight tightness in supra patellar region denies any pain  Objective: See treatment diary below  R knee AAROm to 116 deg      Assessment: Tolerated treatment well  Patient demo demo ongoing swelling in supra patellar region however is progressing ROM snd strengtheing well without adverse effects  Gait deviations persist however related to use of brace  Plan: Continue per plan of care  Progress SLR 4 way to supine and Tband     Precautions: Standard  PROTOCOL Phase III 20, Contraindications: stairs, inclined walk, squat, lunge  Wean from brace  PROM to 110 deg flexion, AROM full         Manuals 8/20 8/25 8/27 9/1 9/3 9/8 9/10       Visit 11 Visit 12 Visit 13 Visit14 Visit 15 Visit 16 Visit 17      PROM   KT KT KT KT       Patellar mobs                          Neuro Re-Ed                                                                                           Active w/u NuStep L2 5' NuStep 10' L2-3 NuStep 10' L3 L3 10' L3 10' 10' Rec Bike 5' L1      Ther Ex             QS 30x 20x 25x 20x 25x 25x With SLR      Ankle Pumps Heel raise 1lb 2x10 2lbs 20x 2lbs 20x 3lbs 20x 3lbs 20x 4lbs 20x 20x      Hamstring str w SOS manual  manual manual manual        Glute sets  DC           Hip SLR 1lb 4 way standing 2lbs 4 way standing 2x10 ea 2lbs 4 way 2x10 ea 3lbs 20x ea 3lbs 20x 4lbs 20x ea 4lbs 20x ea TBand     Heel slides 30x 20x 20x 20x 20x 20x 20x      Heel prop HEP  With QS With QS 25x With QS       SAQ 2x10 1lb 2x10 2lb 2x10 2lbs 2x10 2x10 3lbs 2x10       SLR Supine 2x5 10x 2x10 2x10 2x10 20x 20x      BKFO Green Tband x10  2x10  Blue 2x10 2x10 2x10 Black 2x10 2x10      Wall squats    10x 10x 10x 15x      TG   NV  NV NV       Lunges     NV 10x 15x ea      Prone Ham curls   NV Standing AROM 10x 2x10 20x 4lbs 2x10                                             Ther Activity                                       Gait Training                                       Modalities              Ice 5' 5' 5' 5'   5'

## 2020-09-15 ENCOUNTER — OFFICE VISIT (OUTPATIENT)
Dept: PHYSICAL THERAPY | Facility: CLINIC | Age: 65
End: 2020-09-15
Payer: MEDICARE

## 2020-09-15 DIAGNOSIS — M25.561 ACUTE PAIN OF RIGHT KNEE: Primary | ICD-10-CM

## 2020-09-15 DIAGNOSIS — S76.111D QUADRICEPS TENDON RUPTURE, RIGHT, SUBSEQUENT ENCOUNTER: ICD-10-CM

## 2020-09-15 PROCEDURE — 97110 THERAPEUTIC EXERCISES: CPT | Performed by: PHYSICAL THERAPIST

## 2020-09-15 NOTE — PROGRESS NOTES
Daily Note     Today's date: 9/15/2020  Patient name: Eulalia Johnson  : 1955  MRN: [id number]  Referring provider: Gurpreet Burnham  Dx:   Encounter Diagnosis     ICD-10-CM    1  Acute pain of right knee  M25 561    2  Quadriceps tendon rupture, right, subsequent encounter  S76 111D        Start Time: 1015  Stop Time: 1100  Total time in clinic (min): 45 minutes    Subjective: Pt reports he is not having pain but he is progressing well mobility wise he feels  He reports his brace continues to fall down often and he is looking forward to it's DC  Objective: See treatment diary below      Assessment: Tolerated treatment well  Patient demo good ecc control with therex and good technique thorugh therex progressin  Plan: Continue per plan of care  Precautions: Standard  PROTOCOL Phase III 20, Contraindications: stairs, inclined walk, squat, lunge  Wean from brace  PROM to 110 deg flexion, AROM full         Manuals 8/20 8/25 8/27 9/1 9/3 9/8 9/10 9/15      Visit 11 Visit 12 Visit 15 Visit14 Visit 15 Visit 16 Visit 17 Visit 18     PROM   KT KT KT KT  KT     Patellar mobs                          Neuro Re-Ed                                                                                           Active w/u NuStep L2 5' NuStep 10' L2-3 NuStep 10' L3 L3 10' L3 10' 10' Rec Bike 5' L1 Upright bike 5'     Ther Ex             QS 30x 20x 25x 20x 25x 25x With SLR With SLR     Ankle Pumps Heel raise 1lb 2x10 2lbs 20x 2lbs 20x 3lbs 20x 3lbs 20x 4lbs 20x 20x 20x     Hamstring str w SOS manual  manual manual manual        Glute sets  DC           Hip SLR 1lb 4 way standing 2lbs 4 way standing 2x10 ea 2lbs 4 way 2x10 ea 3lbs 20x ea 3lbs 20x 4lbs 20x ea 4lbs 20x ea TBand Green 20x ea     Heel slides 30x 20x 20x 20x 20x 20x 20x      Heel prop HEP  With QS With QS 25x With QS       SAQ 2x10 1lb 2x10 2lb 2x10 2lbs 2x10 2x10 3lbs 2x10  4lbs 2x10     SLR Supine 2x5 10x 2x10 2x10 2x10 20x 20x 20x     BKFO Green Tband x10  2x10  Blue 2x10 2x10 2x10 Black 2x10 2x10      Wall squats    10x 10x 10x 15x 15x     TG   NV  NV NV       Lunges     NV 10x 15x ea 15x     Prone Ham curls   NV Standing AROM 10x 2x10 20x 4lbs 2x10 2x10                                            Ther Activity                                       Gait Training                                       Modalities              Ice 5' 5' 5' 5'   5' 5'

## 2020-09-17 ENCOUNTER — EVALUATION (OUTPATIENT)
Dept: PHYSICAL THERAPY | Facility: CLINIC | Age: 65
End: 2020-09-17
Payer: MEDICARE

## 2020-09-17 DIAGNOSIS — M25.561 ACUTE PAIN OF RIGHT KNEE: Primary | ICD-10-CM

## 2020-09-17 DIAGNOSIS — S76.111D QUADRICEPS TENDON RUPTURE, RIGHT, SUBSEQUENT ENCOUNTER: ICD-10-CM

## 2020-09-17 PROCEDURE — 97110 THERAPEUTIC EXERCISES: CPT | Performed by: PHYSICAL THERAPIST

## 2020-09-17 NOTE — PROGRESS NOTES
PT Re-Evaluation     Today's date: 2020  Patient name: Cesilia Griggs  : 1955  MRN: [id number]  Referring provider: Edmond Mackenzie  Dx:   Encounter Diagnosis     ICD-10-CM    1  Acute pain of right knee  M25 561    2  Quadriceps tendon rupture, right, subsequent encounter  S76 111D        Start Time: 1015  Stop Time: 1100  Total time in clinic (min): 45 minutes    Assessment  Assessment details: Cesilia Griggs is a 77 y o  male who has been regularly participating in skilled Pt since time of IE with respect to protocol and at this time he demo good potential and good progression toward Phase appropriate goals, similar to last reporting period  At this time pt remains well below his PLOF with relation to strength, activity tolerance and ROM as well as general community participation however is well motivated and is beyond that of his status last reporting period  He would benefit from ongoing skilled OPPT to maximize return to PLOF as he continues through phases of post op healing     Impairments: abnormal gait, abnormal or restricted ROM, activity intolerance, impaired physical strength, lacks appropriate home exercise program, pain with function, weight-bearing intolerance, poor posture  and poor body mechanics  Understanding of Dx/Px/POC: good   Prognosis: good    Goals  Short Term Goals to be accomplished in 6 weeks: - partially met/ongoing  STG1: Pt will be I with HEP  STG2: Pt will demo 50% inc in knee PROM  STG3: Pt will demo 1/2 MMT strength in knee   STG4: Pt will amb community distance with LRAD without pain increase     Long Term Goals to be accomplished in 12 weeks: -ongoing  LTG1: Pt will demo knee strength WFL to return to PLOF  LTG2: Pt will demo knee AROM WNL to minimize gait deviations  LTG3: Pt will return to household ADLs and law duties pain free as per PLOF      Plan  Plan details:  HEP development, stretching, strengthening, A/AA/PROM, joint mobilizations, posture education, STM/MI as needed to reduce muscle tension, muscle reeducation, PLOC discussed and agreed upon with patient  Patient would benefit from: PT eval and skilled physical therapy  Planned modality interventions: cryotherapy and thermotherapy: hydrocollator packs  Planned therapy interventions: manual therapy, neuromuscular re-education, self care, therapeutic activities, therapeutic exercise and home exercise program  Frequency: 2x week (2-3x/week)  Duration in weeks: 6  Treatment plan discussed with: patient        Subjective Evaluation    History of Present Illness  Mechanism of injury: At this time pt feels he is >75% of his PLOF however deficits remain with gait and balance without brace which he has not been performing  "Once I get moving I'm fine" Pt reports he continuing to use railing and crutch for FOS with brace for negotiating stairs however improving indep with less UE dependency  Quality of life: good    Pain  Current pain ratin  At best pain ratin  At worst pain ratin          Objective     Observations     Additional Observation Details  Scar healing well, swelling ongoing suprapatellar region    Active Range of Motion   Left Knee   Normal active range of motion    Right Knee   Flexion: 116 degrees   Extension: 0 degrees     Passive Range of Motion   Left Knee   Normal passive range of motion    Right Knee   Flexion: 120 degrees   Extension: 0 degrees     Strength/Myotome Testing     Additional Strength Details  L LE strength WFL  R Hip MMT 5/5 throughout  R knee MMT 3+/5 as seen through function and therex  R ankle MMT 5/5 throughout      General Comments:      Knee Comments  Function:    Xfers: I wihtout UE    Bed mob: I without assist R LE during sup/sit  Gait: Amb without AD, dec R stance phase slight, min-mod trunk sway    Stairs: Step to pattern with AC and rail, unlocked brace                 Precautions: Standard   PROTOCOL Phase III 20, Contraindications: stairs, inclined walk, squat, lunge  Wean from brace  PROM to 110 deg flexion, AROM full         Manuals 8/20 8/25 8/27 9/1 9/3 9/8 9/10 9/15 9/17     Visit 11 Visit 12 Visit 13 Visit14 Visit 15 Visit 16 Visit 17 Visit 18 Visit 19    PROM   KT KT KT KT  KT KT    Patellar mobs                          Neuro Re-Ed                                                                                           Active w/u NuStep L2 5' NuStep 10' L2-3 NuStep 10' L3 L3 10' L3 10' 10' Rec Bike 5' L1 Upright bike 5' 5'     Ther Ex             QS 30x 20x 25x 20x 25x 25x With SLR With SLR With SLR    Ankle Pumps Heel raise 1lb 2x10 2lbs 20x 2lbs 20x 3lbs 20x 3lbs 20x 4lbs 20x 20x 20x 20x    Hamstring str w SOS manual  manual manual manual        Glute sets  DC           Hip SLR 1lb 4 way standing 2lbs 4 way standing 2x10 ea 2lbs 4 way 2x10 ea 3lbs 20x ea 3lbs 20x 4lbs 20x ea 4lbs 20x ea TBand Green 20x ea Rev Tband, 4lbs 20x ea    Heel slides 30x 20x 20x 20x 20x 20x 20x      Heel prop HEP  With QS With QS 25x With QS       SAQ 2x10 1lb 2x10 2lb 2x10 2lbs 2x10 2x10 3lbs 2x10  4lbs 2x10 4lbs 2x10    SLR Supine 2x5 10x 2x10 2x10 2x10 20x 20x 20x 20x, Hip abd 2x10    BKFO Green Tband x10  2x10  Blue 2x10 2x10 2x10 Black 2x10 2x10  Black 2x10    Wall squats    10x 10x 10x 15x 15x     TG   NV  NV NV       Lunges     NV 10x 15x ea 15x     Prone Ham curls   NV Standing AROM 10x 2x10 20x 4lbs 2x10 2x10 4lbs 2x10                                           Ther Activity                                       Gait Training                                       Modalities              Ice 5' 5' 5' 5'   5' 5' 5'

## 2020-09-21 ENCOUNTER — OFFICE VISIT (OUTPATIENT)
Dept: PHYSICAL THERAPY | Facility: CLINIC | Age: 65
End: 2020-09-21
Payer: MEDICARE

## 2020-09-21 DIAGNOSIS — M25.561 ACUTE PAIN OF RIGHT KNEE: ICD-10-CM

## 2020-09-21 DIAGNOSIS — S76.111D QUADRICEPS TENDON RUPTURE, RIGHT, SUBSEQUENT ENCOUNTER: Primary | ICD-10-CM

## 2020-09-21 PROCEDURE — 97110 THERAPEUTIC EXERCISES: CPT | Performed by: PHYSICAL THERAPIST

## 2020-09-21 NOTE — PROGRESS NOTES
Daily Note     Today's date: 2020  Patient name: Rigoberto Hicks  : 1955  MRN: [id number]  Referring provider: Ayaan Virk  Dx:   Encounter Diagnosis     ICD-10-CM    1  Quadriceps tendon rupture, right, subsequent encounter  S76 111D    2  Acute pain of right knee  M25 561        Start Time: 1015  Stop Time: 1100  Total time in clinic (min): 45 minutes    Subjective: Pt has no new complaints at this time, he sees MD later this week  Nil pain reported  "It feels good!"      Objective: See treatment diary below  R knee AAROM 120 deg heel slide  Assessment: Tolerated treatment well  Patient demo steady ROM and strength progressions as well as activty toleracen with ongoing activities outside of therapy  Plan: Continue per plan of care  Precautions: Standard  PROTOCOL Phase III 20, Contraindications: stairs, inclined walk, squat, lunge  Wean from brace  PROM to 110 deg flexion, AROM full         Manuals 8/20 8/25 8/27 9/1 9/3 9/8 9/10 9/15 9/17 9/21    Visit 11 Visit 12 Visit 15 Visit14 Visit 15 Visit 16 Visit 17 Visit 18 Visit 19 Visit 20   PROM   KT KT KT KT  KT KT    Patellar mobs                          Neuro Re-Ed                                                                                           Active w/u NuStep L2 5' NuStep 10' L2-3 NuStep 10' L3 L3 10' L3 10' 10' Rec Bike 5' L1 Upright bike 5' 5'  7 5 upright L2   Ther Ex             QS 30x 20x 25x 20x 25x 25x With SLR With SLR With SLR    Ankle Pumps Heel raise 1lb 2x10 2lbs 20x 2lbs 20x 3lbs 20x 3lbs 20x 4lbs 20x 20x 20x 20x 20x   Hamstring str w SOS manual  manual manual manual        Glute sets  DC           Hip SLR 1lb 4 way standing 2lbs 4 way standing 2x10 ea 2lbs 4 way 2x10 ea 3lbs 20x ea 3lbs 20x 4lbs 20x ea 4lbs 20x ea TBand Green 20x ea Rev Tband, 4lbs 20x ea Tband side step Blue 10' x 3   Heel slides 30x 20x 20x 20x 20x 20x 20x   x20   Heel prop HEP  With QS With QS 25x With QS       SAQ 2x10 1lb 2x10 2lb 2x10 2lbs 2x10 2x10 3lbs 2x10  4lbs 2x10 4lbs 2x10 2x10   SLR Supine 2x5 10x 2x10 2x10 2x10 20x 20x 20x 20x, Hip abd 2x10 2x10   BKFO Green Tband x10  2x10  Blue 2x10 2x10 2x10 Black 2x10 2x10  Black 2x10 2x10   Wall squats    10x 10x 10x 15x 15x  x10   TG   NV  NV NV       Lunges     NV 10x 15x ea 15x  x10   Prone Ham curls   NV Standing AROM 10x 2x10 20x 4lbs 2x10 2x10 4lbs 2x10 2x10                                          Ther Activity                                       Gait Training                                       Modalities              Ice 5' 5' 5' 5'   5' 5' 5' 5'
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
## 2020-09-22 ENCOUNTER — OFFICE VISIT (OUTPATIENT)
Dept: UROLOGY | Facility: CLINIC | Age: 65
End: 2020-09-22
Payer: MEDICARE

## 2020-09-22 VITALS
DIASTOLIC BLOOD PRESSURE: 88 MMHG | RESPIRATION RATE: 18 BRPM | WEIGHT: 283 LBS | SYSTOLIC BLOOD PRESSURE: 130 MMHG | TEMPERATURE: 98.2 F | HEART RATE: 76 BPM | HEIGHT: 70 IN | BODY MASS INDEX: 40.52 KG/M2

## 2020-09-22 DIAGNOSIS — N20.0 KIDNEY STONES: Primary | ICD-10-CM

## 2020-09-22 PROCEDURE — 99203 OFFICE O/P NEW LOW 30 MIN: CPT | Performed by: PHYSICIAN ASSISTANT

## 2020-09-24 ENCOUNTER — OFFICE VISIT (OUTPATIENT)
Dept: PHYSICAL THERAPY | Facility: CLINIC | Age: 65
End: 2020-09-24
Payer: MEDICARE

## 2020-09-24 DIAGNOSIS — S76.111D QUADRICEPS TENDON RUPTURE, RIGHT, SUBSEQUENT ENCOUNTER: ICD-10-CM

## 2020-09-24 DIAGNOSIS — M25.561 ACUTE PAIN OF RIGHT KNEE: Primary | ICD-10-CM

## 2020-09-24 PROCEDURE — 97110 THERAPEUTIC EXERCISES: CPT | Performed by: PHYSICAL THERAPIST

## 2020-09-24 NOTE — PROGRESS NOTES
Daily Note     Today's date: 2020  Patient name: Edilia Franklin  : 1955  MRN: [id number]  Referring provider: Leonardo Found  Dx:   Encounter Diagnosis     ICD-10-CM    1  Acute pain of right knee  M25 561    2  Quadriceps tendon rupture, right, subsequent encounter  S76 111D        Start Time: 1015  Stop Time: 1100  Total time in clinic (min): 45 minutes    Subjective: Pt reports he is walking more and more and feeling great  Pt denies pain  Pt is pleased he has been able to sit with leg crossed over the L        Objective: See treatment diary below  R knee AAROM to 120 deg  Assessment: Tolerated treatment well  Patient able to complete SLR wihtout extensor lag and good strength and full motion with good eccentric control  Gait deviations with trunk swy perisst with relation to dec stance time on R while in brace  Plan: Continue per plan of care  Progress protocol next visit with MD clearance  Precautions: Standard  PROTOCOL Phase III 20, Contraindications: stairs, inclined walk, squat, lunge  Wean from brace  PROM to 110 deg flexion, AROM full         Manuals 9/24 8/25 8/27 9/1 9/3 9/8 9/10 9/15 9/17 9/21    Visit 21 Visit 12 Visit 13 Visit14 Visit 15 Visit 16 Visit 17 Visit 18 Visit 19 Visit 20   PROM KT  KT KT KT KT  KT KT    Patellar mobs KT                         Neuro Re-Ed                                                                                           Active w/u Upright bike 7' L3 NuStep 10' L2-3 NuStep 10' L3 L3 10' L3 10' 10' Rec Bike 5' L1 Upright bike 5' 5'  7 5 upright L2   Ther Ex             QS With SLR 20x 25x 20x 25x 25x With SLR With SLR With SLR    Ankle Pumps 20x 2lbs 20x 2lbs 20x 3lbs 20x 3lbs 20x 4lbs 20x 20x 20x 20x 20x   Hamstring str w SOS manual  manual manual manual        Glute sets  DC           Hip SLR 4lbs 20x ea 2lbs 4 way standing 2x10 ea 2lbs 4 way 2x10 ea 3lbs 20x ea 3lbs 20x 4lbs 20x ea 4lbs 20x ea TBand Green 20x ea Rev Tband, 4lbs 20x ea Tband side step Blue 10' x 3   Heel slides 20x 20x 20x 20x 20x 20x 20x   x20   Heel prop   With QS With QS 25x With QS       SAQ 2x10 5lbs 1lb 2x10 2lb 2x10 2lbs 2x10 2x10 3lbs 2x10  4lbs 2x10 4lbs 2x10 2x10   SLR Supine 2x10 10x 2x10 2x10 2x10 20x 20x 20x 20x, Hip abd 2x10 2x10   BKFO Black 3x10 2x10  Blue 2x10 2x10 2x10 Black 2x10 2x10  Black 2x10 2x10   Wall squats 10x   10x 10x 10x 15x 15x  x10   TG NV  NV  NV NV       Lunges 10x ea    NV 10x 15x ea 15x  x10   Prone Ham curls 2x10  NV Standing AROM 10x 2x10 20x 4lbs 2x10 2x10 4lbs 2x10 2x10   Side stepping 5laps 10'                                      Ther Activity                                       Gait Training                                       Modalities              Ice 5' 5' 5' 5'   5' 5' 5' 5'

## 2020-09-25 ENCOUNTER — OFFICE VISIT (OUTPATIENT)
Dept: OBGYN CLINIC | Facility: CLINIC | Age: 65
End: 2020-09-25

## 2020-09-25 VITALS — TEMPERATURE: 97.5 F | HEIGHT: 70 IN | BODY MASS INDEX: 40.61 KG/M2

## 2020-09-25 DIAGNOSIS — S76.111D QUADRICEPS TENDON RUPTURE, RIGHT, SUBSEQUENT ENCOUNTER: Primary | ICD-10-CM

## 2020-09-25 PROCEDURE — 99024 POSTOP FOLLOW-UP VISIT: CPT | Performed by: ORTHOPAEDIC SURGERY
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
## 2020-09-28 ENCOUNTER — OFFICE VISIT (OUTPATIENT)
Dept: PHYSICAL THERAPY | Facility: CLINIC | Age: 65
End: 2020-09-28
Payer: MEDICARE

## 2020-09-28 ENCOUNTER — HOSPITAL ENCOUNTER (OUTPATIENT)
Dept: RADIOLOGY | Facility: HOSPITAL | Age: 65
Discharge: HOME | End: 2020-09-28
Payer: MEDICARE

## 2020-09-28 DIAGNOSIS — N20.0 KIDNEY STONES: ICD-10-CM

## 2020-09-28 DIAGNOSIS — S76.111D QUADRICEPS TENDON RUPTURE, RIGHT, SUBSEQUENT ENCOUNTER: ICD-10-CM

## 2020-09-28 DIAGNOSIS — M25.561 ACUTE PAIN OF RIGHT KNEE: Primary | ICD-10-CM

## 2020-09-28 PROCEDURE — 97110 THERAPEUTIC EXERCISES: CPT | Performed by: PHYSICAL THERAPIST

## 2020-09-28 PROCEDURE — 74176 CT ABD & PELVIS W/O CONTRAST: CPT

## 2020-09-28 PROCEDURE — G1004 CDSM NDSC: HCPCS

## 2020-09-28 NOTE — PROGRESS NOTES
Daily Note     Today's date: 2020  Patient name: Unruly Anderson  : 1955  MRN: [id number]  Referring provider: Iza Servin  Dx:   Encounter Diagnosis     ICD-10-CM    1  Acute pain of right knee  M25 561    2  Quadriceps tendon rupture, right, subsequent encounter  S76 111D        Start Time: 1055  Stop Time: 1130  Total time in clinic (min): 35 minutes    Subjective: Pt reports he is feeling fatigued with progression of exercise however denies any pain  Pt is pleased to not have to wear brace anymore and is acknowledging he feels he is walking "more normal "      Objective: See treatment diary below  R knee AAROM 121 deg flexion  Slight gait deviation persists with dec R stance time, vc to correct during gait training exercises  Slightly abbrev session today, pt has CT scan scheduled following this appt  Assessment: Tolerated treatment well  Patient demo good technique with progression through protocol at this time  Emphasis on normalizing gait pattern and progressin funcitonal omb and activity tolerane  Plan: Continue per plan of care  Precautions: Standard  PROTOCOL Phase III 20, Contraindications: stairs, inclined walk, squat, lunge  Wean from brace  PROM to 110 deg flexion, AROM full         Manuals             Visit 21 Visit 22           PROM KT            Patellar mobs KT                         Neuro Re-Ed                                                                                           Active w/u Upright bike 7' L3 5' L3           Ther Ex             QS With SLR 20x QS           Ankle Pumps 20x            Hamstring str w SOS manual            Glute sets             Hip SLR 4lbs 20x ea            Heel slides 20x 20x           Heel prop             SAQ 2x10 5lbs            SLR Supine 2x10 2lbs 10x           BKFO Black 3x10            Wall squats 10x            TG NV            Lunges 10x ea            Prone Ham curls 2x10 Sup resisted  Blue 10x Side stepping 5laps 10' 5 laps           SLS  5-10s  x5                        Ther Activity                                       Gait Training             Hurdles  6 x 5laps lowest setting fwd/lat ea           Step up  Fwd/Lat L1 10x ea           Step Down progression  TBI                                     Modalities              Ice 5' 5'

## 2020-09-29 ENCOUNTER — TELEPHONE (OUTPATIENT)
Dept: NEPHROLOGY | Facility: CLINIC | Age: 65
End: 2020-09-29

## 2020-09-29 ENCOUNTER — TELEPHONE (OUTPATIENT)
Dept: OBGYN CLINIC | Facility: CLINIC | Age: 65
End: 2020-09-29

## 2020-10-01 ENCOUNTER — OFFICE VISIT (OUTPATIENT)
Dept: PHYSICAL THERAPY | Facility: CLINIC | Age: 65
End: 2020-10-01
Payer: MEDICARE

## 2020-10-01 DIAGNOSIS — S76.111D QUADRICEPS TENDON RUPTURE, RIGHT, SUBSEQUENT ENCOUNTER: Primary | ICD-10-CM

## 2020-10-01 DIAGNOSIS — M25.561 ACUTE PAIN OF RIGHT KNEE: ICD-10-CM

## 2020-10-01 PROCEDURE — 97110 THERAPEUTIC EXERCISES: CPT

## 2020-10-06 ENCOUNTER — OFFICE VISIT (OUTPATIENT)
Dept: PHYSICAL THERAPY | Facility: CLINIC | Age: 65
End: 2020-10-06
Payer: MEDICARE

## 2020-10-06 DIAGNOSIS — S76.111D QUADRICEPS TENDON RUPTURE, RIGHT, SUBSEQUENT ENCOUNTER: Primary | ICD-10-CM

## 2020-10-06 PROCEDURE — 97110 THERAPEUTIC EXERCISES: CPT

## 2020-10-08 ENCOUNTER — APPOINTMENT (OUTPATIENT)
Dept: PHYSICAL THERAPY | Facility: CLINIC | Age: 65
End: 2020-10-08
Payer: MEDICARE

## 2020-10-13 ENCOUNTER — OFFICE VISIT (OUTPATIENT)
Dept: PHYSICAL THERAPY | Facility: CLINIC | Age: 65
End: 2020-10-13
Payer: MEDICARE

## 2020-10-13 DIAGNOSIS — M25.561 ACUTE PAIN OF RIGHT KNEE: Primary | ICD-10-CM

## 2020-10-13 DIAGNOSIS — S76.111D QUADRICEPS TENDON RUPTURE, RIGHT, SUBSEQUENT ENCOUNTER: ICD-10-CM

## 2020-10-13 PROCEDURE — 97110 THERAPEUTIC EXERCISES: CPT | Performed by: PHYSICAL THERAPIST

## 2020-10-13 NOTE — TELEPHONE ENCOUNTER
Patient seen by Mily Bowden PA-C for kidney stones 9/22/20  On previously done US, there was 10mm non obstructing right kidney stone  Plan was for CT scan and then phone call with results and discussion of options for stone management  Please review CT done 9/28/20 at advise

## 2020-10-13 NOTE — TELEPHONE ENCOUNTER
Patients spouse Moshe called in asking what is the next steps for care  Moshe stated patient has a large stone which he can not pass   Moshe can be reached at [phone]

## 2020-10-15 ENCOUNTER — EVALUATION (OUTPATIENT)
Dept: PHYSICAL THERAPY | Facility: CLINIC | Age: 65
End: 2020-10-15
Payer: MEDICARE

## 2020-10-15 DIAGNOSIS — S76.111D QUADRICEPS TENDON RUPTURE, RIGHT, SUBSEQUENT ENCOUNTER: ICD-10-CM

## 2020-10-15 DIAGNOSIS — M25.561 ACUTE PAIN OF RIGHT KNEE: Primary | ICD-10-CM

## 2020-10-15 PROCEDURE — 97110 THERAPEUTIC EXERCISES: CPT | Performed by: PHYSICAL THERAPIST

## 2020-10-20 ENCOUNTER — OFFICE VISIT (OUTPATIENT)
Dept: PHYSICAL THERAPY | Facility: CLINIC | Age: 65
End: 2020-10-20
Payer: MEDICARE

## 2020-10-20 DIAGNOSIS — S76.111D QUADRICEPS TENDON RUPTURE, RIGHT, SUBSEQUENT ENCOUNTER: ICD-10-CM

## 2020-10-20 DIAGNOSIS — M25.561 ACUTE PAIN OF RIGHT KNEE: Primary | ICD-10-CM

## 2020-10-20 PROCEDURE — 97110 THERAPEUTIC EXERCISES: CPT | Performed by: PHYSICAL THERAPIST

## 2020-10-22 ENCOUNTER — OFFICE VISIT (OUTPATIENT)
Dept: PHYSICAL THERAPY | Facility: CLINIC | Age: 65
End: 2020-10-22
Payer: MEDICARE

## 2020-10-22 DIAGNOSIS — M25.561 ACUTE PAIN OF RIGHT KNEE: Primary | ICD-10-CM

## 2020-10-22 DIAGNOSIS — S76.111D QUADRICEPS TENDON RUPTURE, RIGHT, SUBSEQUENT ENCOUNTER: ICD-10-CM

## 2020-10-22 PROCEDURE — 97110 THERAPEUTIC EXERCISES: CPT

## 2020-10-22 PROCEDURE — 97110 THERAPEUTIC EXERCISES: CPT | Performed by: PHYSICAL THERAPIST

## 2020-10-27 ENCOUNTER — OFFICE VISIT (OUTPATIENT)
Dept: PHYSICAL THERAPY | Facility: CLINIC | Age: 65
End: 2020-10-27
Payer: MEDICARE

## 2020-10-27 DIAGNOSIS — M25.561 ACUTE PAIN OF RIGHT KNEE: Primary | ICD-10-CM

## 2020-10-27 DIAGNOSIS — S76.111D QUADRICEPS TENDON RUPTURE, RIGHT, SUBSEQUENT ENCOUNTER: ICD-10-CM

## 2020-10-27 PROCEDURE — 97110 THERAPEUTIC EXERCISES: CPT | Performed by: PHYSICAL THERAPIST

## 2020-10-28 NOTE — TELEPHONE ENCOUNTER
Call received from wife, Chrissie Stacy  This in regard to the current kidney stone  They are inquiring if this should be observed or needs to be removed  At this time, the patient is not experiencing pain    Please call to discuss at [phone]  Thank you

## 2020-10-28 NOTE — TELEPHONE ENCOUNTER
A nonobstructing stone in the lower pole of the kidney is very stable  Patient can be seen as the next available new patient  If he becomes symptomatic, he should contact our office or go to the emergency department  I think that this is unlikely

## 2020-10-28 NOTE — TELEPHONE ENCOUNTER
SPOKE TO WIFE CASSIDY AND SHE SAID IF IT BECOMES SYMPTOMATIC THEN SHE WILL MAKE AN APPT  AT THIS TIME SHE DIDN'T WANT TO MAKE ONE

## 2020-10-29 ENCOUNTER — OFFICE VISIT (OUTPATIENT)
Dept: PHYSICAL THERAPY | Facility: CLINIC | Age: 65
End: 2020-10-29
Payer: MEDICARE

## 2020-10-29 DIAGNOSIS — S76.111D QUADRICEPS TENDON RUPTURE, RIGHT, SUBSEQUENT ENCOUNTER: ICD-10-CM

## 2020-10-29 DIAGNOSIS — M25.561 ACUTE PAIN OF RIGHT KNEE: Primary | ICD-10-CM

## 2020-10-29 PROCEDURE — 97110 THERAPEUTIC EXERCISES: CPT | Performed by: PHYSICAL THERAPIST

## 2020-11-02 ENCOUNTER — HOSPITAL ENCOUNTER (OUTPATIENT)
Dept: RADIOLOGY | Facility: HOSPITAL | Age: 65
Discharge: HOME | End: 2020-11-02
Payer: MEDICARE

## 2020-11-02 DIAGNOSIS — I72.4 POPLITEAL ARTERY ANEURYSM, BILATERAL (HCC): ICD-10-CM

## 2020-11-02 DIAGNOSIS — I72.4 FEMORAL ARTERY ANEURYSM, BILATERAL (HCC): ICD-10-CM

## 2020-11-02 DIAGNOSIS — I71.2 THORACIC AORTIC ANEURYSM WITHOUT RUPTURE (HCC): ICD-10-CM

## 2020-11-02 DIAGNOSIS — I10 ESSENTIAL HYPERTENSION: ICD-10-CM

## 2020-11-02 DIAGNOSIS — E78.5 HYPERLIPIDEMIA, MILD: ICD-10-CM

## 2020-11-02 PROCEDURE — 93925 LOWER EXTREMITY STUDY: CPT | Performed by: SURGERY

## 2020-11-02 PROCEDURE — 93922 UPR/L XTREMITY ART 2 LEVELS: CPT | Performed by: SURGERY

## 2020-11-02 PROCEDURE — 93923 UPR/LXTR ART STDY 3+ LVLS: CPT

## 2020-11-02 PROCEDURE — 93925 LOWER EXTREMITY STUDY: CPT

## 2020-11-03 ENCOUNTER — OFFICE VISIT (OUTPATIENT)
Dept: PHYSICAL THERAPY | Facility: CLINIC | Age: 65
End: 2020-11-03
Payer: MEDICARE

## 2020-11-03 DIAGNOSIS — M25.561 ACUTE PAIN OF RIGHT KNEE: Primary | ICD-10-CM

## 2020-11-03 DIAGNOSIS — S76.111D QUADRICEPS TENDON RUPTURE, RIGHT, SUBSEQUENT ENCOUNTER: ICD-10-CM

## 2020-11-03 PROCEDURE — 97110 THERAPEUTIC EXERCISES: CPT | Performed by: PHYSICAL THERAPIST

## 2020-11-06 ENCOUNTER — OFFICE VISIT (OUTPATIENT)
Dept: OBGYN CLINIC | Facility: CLINIC | Age: 65
End: 2020-11-06
Payer: MEDICARE

## 2020-11-06 VITALS
SYSTOLIC BLOOD PRESSURE: 130 MMHG | HEART RATE: 52 BPM | WEIGHT: 290 LBS | BODY MASS INDEX: 41.52 KG/M2 | DIASTOLIC BLOOD PRESSURE: 80 MMHG | HEIGHT: 70 IN

## 2020-11-06 DIAGNOSIS — S76.111D QUADRICEPS TENDON RUPTURE, RIGHT, SUBSEQUENT ENCOUNTER: Primary | ICD-10-CM

## 2020-11-06 PROCEDURE — 99213 OFFICE O/P EST LOW 20 MIN: CPT | Performed by: ORTHOPAEDIC SURGERY

## 2020-11-08 DIAGNOSIS — I10 ESSENTIAL HYPERTENSION: ICD-10-CM

## 2020-11-09 RX ORDER — LISINOPRIL 5 MG/1
5 TABLET ORAL DAILY
Qty: 90 TABLET | Refills: 0 | Status: SHIPPED | OUTPATIENT
Start: 2020-11-09 | End: 2021-02-08 | Stop reason: SDUPTHER

## 2020-11-09 RX ORDER — METOPROLOL SUCCINATE 50 MG/1
75 TABLET, EXTENDED RELEASE ORAL DAILY
Qty: 135 TABLET | Refills: 0 | Status: SHIPPED | OUTPATIENT
Start: 2020-11-09 | End: 2021-02-22 | Stop reason: SDUPTHER

## 2020-11-10 ENCOUNTER — OFFICE VISIT (OUTPATIENT)
Dept: PHYSICAL THERAPY | Facility: CLINIC | Age: 65
End: 2020-11-10
Payer: MEDICARE

## 2020-11-10 DIAGNOSIS — M25.561 ACUTE PAIN OF RIGHT KNEE: Primary | ICD-10-CM

## 2020-11-10 DIAGNOSIS — S76.111D QUADRICEPS TENDON RUPTURE, RIGHT, SUBSEQUENT ENCOUNTER: ICD-10-CM

## 2020-11-10 PROCEDURE — 97110 THERAPEUTIC EXERCISES: CPT | Performed by: PHYSICAL THERAPIST

## 2020-11-12 ENCOUNTER — APPOINTMENT (OUTPATIENT)
Dept: PHYSICAL THERAPY | Facility: CLINIC | Age: 65
End: 2020-11-12
Payer: MEDICARE

## 2020-11-17 ENCOUNTER — OFFICE VISIT (OUTPATIENT)
Dept: PHYSICAL THERAPY | Facility: CLINIC | Age: 65
End: 2020-11-17
Payer: MEDICARE

## 2020-11-17 DIAGNOSIS — M25.561 ACUTE PAIN OF RIGHT KNEE: Primary | ICD-10-CM

## 2020-11-17 DIAGNOSIS — S76.111D QUADRICEPS TENDON RUPTURE, RIGHT, SUBSEQUENT ENCOUNTER: ICD-10-CM

## 2020-11-17 PROCEDURE — 97110 THERAPEUTIC EXERCISES: CPT | Performed by: PHYSICAL THERAPIST

## 2020-11-18 ENCOUNTER — CONSULT (OUTPATIENT)
Dept: CARDIOLOGY CLINIC | Facility: CLINIC | Age: 65
End: 2020-11-18
Payer: MEDICARE

## 2020-11-18 VITALS
DIASTOLIC BLOOD PRESSURE: 84 MMHG | TEMPERATURE: 98.6 F | HEART RATE: 56 BPM | OXYGEN SATURATION: 99 % | SYSTOLIC BLOOD PRESSURE: 122 MMHG | BODY MASS INDEX: 41.32 KG/M2 | HEIGHT: 70 IN | WEIGHT: 288.6 LBS

## 2020-11-18 DIAGNOSIS — I71.2 THORACIC AORTIC ANEURYSM WITHOUT RUPTURE (HCC): ICD-10-CM

## 2020-11-18 DIAGNOSIS — I72.4 FEMORAL ARTERY ANEURYSM, BILATERAL (HCC): ICD-10-CM

## 2020-11-18 DIAGNOSIS — I10 ESSENTIAL HYPERTENSION: Primary | ICD-10-CM

## 2020-11-18 DIAGNOSIS — I72.4 POPLITEAL ARTERY ANEURYSM, BILATERAL (HCC): ICD-10-CM

## 2020-11-18 DIAGNOSIS — E78.5 HYPERLIPIDEMIA, MILD: ICD-10-CM

## 2020-11-18 PROCEDURE — 93000 ELECTROCARDIOGRAM COMPLETE: CPT | Performed by: INTERNAL MEDICINE

## 2020-11-18 PROCEDURE — 99204 OFFICE O/P NEW MOD 45 MIN: CPT | Performed by: INTERNAL MEDICINE

## 2020-12-15 ENCOUNTER — OFFICE VISIT (OUTPATIENT)
Dept: FAMILY MEDICINE CLINIC | Facility: CLINIC | Age: 65
End: 2020-12-15
Payer: MEDICARE

## 2020-12-15 VITALS
RESPIRATION RATE: 20 BRPM | HEART RATE: 56 BPM | DIASTOLIC BLOOD PRESSURE: 68 MMHG | WEIGHT: 288 LBS | BODY MASS INDEX: 42.65 KG/M2 | SYSTOLIC BLOOD PRESSURE: 110 MMHG | OXYGEN SATURATION: 97 % | HEIGHT: 69 IN | TEMPERATURE: 97.9 F

## 2020-12-15 DIAGNOSIS — Z23 NEED FOR VACCINATION: ICD-10-CM

## 2020-12-15 DIAGNOSIS — Z00.00 MEDICARE ANNUAL WELLNESS VISIT, SUBSEQUENT: Primary | ICD-10-CM

## 2020-12-15 DIAGNOSIS — E78.5 HYPERLIPIDEMIA, MILD: ICD-10-CM

## 2020-12-15 DIAGNOSIS — I10 ESSENTIAL HYPERTENSION: ICD-10-CM

## 2020-12-15 DIAGNOSIS — Z12.5 PROSTATE CANCER SCREENING: ICD-10-CM

## 2020-12-15 PROCEDURE — 90732 PPSV23 VACC 2 YRS+ SUBQ/IM: CPT | Performed by: FAMILY MEDICINE

## 2020-12-15 PROCEDURE — G0009 ADMIN PNEUMOCOCCAL VACCINE: HCPCS | Performed by: FAMILY MEDICINE

## 2020-12-15 PROCEDURE — G0439 PPPS, SUBSEQ VISIT: HCPCS | Performed by: FAMILY MEDICINE

## 2021-01-04 ENCOUNTER — TRANSCRIBE ORDERS (OUTPATIENT)
Dept: ADMINISTRATIVE | Facility: HOSPITAL | Age: 66
End: 2021-01-04

## 2021-01-04 ENCOUNTER — LAB (OUTPATIENT)
Dept: LAB | Facility: HOSPITAL | Age: 66
End: 2021-01-04
Attending: INTERNAL MEDICINE
Payer: MEDICARE

## 2021-01-04 DIAGNOSIS — Q61.3 POLYCYSTIC KIDNEY DISEASE: ICD-10-CM

## 2021-01-04 DIAGNOSIS — Z12.5 PROSTATE CANCER SCREENING: ICD-10-CM

## 2021-01-04 DIAGNOSIS — I71.2 THORACIC AORTIC ANEURYSM WITHOUT RUPTURE (HCC): ICD-10-CM

## 2021-01-04 DIAGNOSIS — E78.5 HYPERLIPIDEMIA, MILD: ICD-10-CM

## 2021-01-04 DIAGNOSIS — I10 ESSENTIAL HYPERTENSION: ICD-10-CM

## 2021-01-04 LAB
ALBUMIN SERPL BCP-MCNC: 3.4 G/DL (ref 3.5–5)
ALP SERPL-CCNC: 47 U/L (ref 46–116)
ALT SERPL W P-5'-P-CCNC: 26 U/L (ref 12–78)
ANION GAP SERPL CALCULATED.3IONS-SCNC: 5 MMOL/L (ref 4–13)
AST SERPL W P-5'-P-CCNC: 19 U/L (ref 5–45)
BASOPHILS # BLD AUTO: 0.04 THOUSANDS/ΜL (ref 0–0.1)
BASOPHILS NFR BLD AUTO: 1 % (ref 0–1)
BILIRUB SERPL-MCNC: 0.4 MG/DL (ref 0.2–1)
BUN SERPL-MCNC: 21 MG/DL (ref 5–25)
CALCIUM ALBUM COR SERPL-MCNC: 9.3 MG/DL (ref 8.3–10.1)
CALCIUM SERPL-MCNC: 8.8 MG/DL (ref 8.3–10.1)
CHLORIDE SERPL-SCNC: 106 MMOL/L (ref 100–108)
CHOLEST SERPL-MCNC: 159 MG/DL (ref 50–200)
CO2 SERPL-SCNC: 27 MMOL/L (ref 21–32)
CREAT SERPL-MCNC: 1.11 MG/DL (ref 0.6–1.3)
CREAT UR-MCNC: 66.1 MG/DL
EOSINOPHIL # BLD AUTO: 0.18 THOUSAND/ΜL (ref 0–0.61)
EOSINOPHIL NFR BLD AUTO: 3 % (ref 0–6)
ERYTHROCYTE [DISTWIDTH] IN BLOOD BY AUTOMATED COUNT: 13.4 % (ref 11.6–15.1)
FERRITIN SERPL-MCNC: 11 NG/ML (ref 8–388)
GFR SERPL CREATININE-BSD FRML MDRD: 68 ML/MIN/1.73SQ M
GLUCOSE P FAST SERPL-MCNC: 102 MG/DL (ref 65–99)
HCT VFR BLD AUTO: 47.6 % (ref 36.5–49.3)
HDLC SERPL-MCNC: 42 MG/DL
HGB BLD-MCNC: 14.9 G/DL (ref 12–17)
IMM GRANULOCYTES # BLD AUTO: 0.04 THOUSAND/UL (ref 0–0.2)
IMM GRANULOCYTES NFR BLD AUTO: 1 % (ref 0–2)
IRON SATN MFR SERPL: 13 %
IRON SERPL-MCNC: 63 UG/DL (ref 65–175)
LDLC SERPL CALC-MCNC: 102 MG/DL (ref 0–100)
LYMPHOCYTES # BLD AUTO: 1.04 THOUSANDS/ΜL (ref 0.6–4.47)
LYMPHOCYTES NFR BLD AUTO: 20 % (ref 14–44)
MCH RBC QN AUTO: 26.5 PG (ref 26.8–34.3)
MCHC RBC AUTO-ENTMCNC: 31.3 G/DL (ref 31.4–37.4)
MCV RBC AUTO: 85 FL (ref 82–98)
MONOCYTES # BLD AUTO: 0.64 THOUSAND/ΜL (ref 0.17–1.22)
MONOCYTES NFR BLD AUTO: 12 % (ref 4–12)
NEUTROPHILS # BLD AUTO: 3.35 THOUSANDS/ΜL (ref 1.85–7.62)
NEUTS SEG NFR BLD AUTO: 63 % (ref 43–75)
NRBC BLD AUTO-RTO: 0 /100 WBCS
PLATELET # BLD AUTO: 201 THOUSANDS/UL (ref 149–390)
PMV BLD AUTO: 9.6 FL (ref 8.9–12.7)
POTASSIUM SERPL-SCNC: 4.2 MMOL/L (ref 3.5–5.3)
PROT SERPL-MCNC: 7.1 G/DL (ref 6.4–8.2)
PROT UR-MCNC: 6 MG/DL
PROT/CREAT UR: 0.09 MG/G{CREAT} (ref 0–0.1)
PSA SERPL-MCNC: 3.1 NG/ML (ref 0–4)
RBC # BLD AUTO: 5.62 MILLION/UL (ref 3.88–5.62)
SODIUM SERPL-SCNC: 138 MMOL/L (ref 136–145)
TIBC SERPL-MCNC: 491 UG/DL (ref 250–450)
TRIGL SERPL-MCNC: 75 MG/DL
WBC # BLD AUTO: 5.29 THOUSAND/UL (ref 4.31–10.16)

## 2021-01-04 PROCEDURE — 82728 ASSAY OF FERRITIN: CPT

## 2021-01-04 PROCEDURE — 85025 COMPLETE CBC W/AUTO DIFF WBC: CPT

## 2021-01-04 PROCEDURE — 36415 COLL VENOUS BLD VENIPUNCTURE: CPT

## 2021-01-04 PROCEDURE — 82570 ASSAY OF URINE CREATININE: CPT

## 2021-01-04 PROCEDURE — 84156 ASSAY OF PROTEIN URINE: CPT

## 2021-01-04 PROCEDURE — 80061 LIPID PANEL: CPT

## 2021-01-04 PROCEDURE — 83550 IRON BINDING TEST: CPT

## 2021-01-04 PROCEDURE — G0103 PSA SCREENING: HCPCS

## 2021-01-04 PROCEDURE — 80053 COMPREHEN METABOLIC PANEL: CPT

## 2021-01-04 PROCEDURE — 83540 ASSAY OF IRON: CPT

## 2021-01-18 ENCOUNTER — OFFICE VISIT (OUTPATIENT)
Dept: NEPHROLOGY | Facility: CLINIC | Age: 66
End: 2021-01-18
Payer: MEDICARE

## 2021-01-18 VITALS
WEIGHT: 294 LBS | SYSTOLIC BLOOD PRESSURE: 128 MMHG | DIASTOLIC BLOOD PRESSURE: 80 MMHG | BODY MASS INDEX: 43.55 KG/M2 | HEIGHT: 69 IN

## 2021-01-18 DIAGNOSIS — I10 ESSENTIAL HYPERTENSION: ICD-10-CM

## 2021-01-18 DIAGNOSIS — D50.9 IRON DEFICIENCY ANEMIA, UNSPECIFIED IRON DEFICIENCY ANEMIA TYPE: ICD-10-CM

## 2021-01-18 DIAGNOSIS — Q61.3 POLYCYSTIC KIDNEY DISEASE: Primary | ICD-10-CM

## 2021-01-18 PROCEDURE — 99214 OFFICE O/P EST MOD 30 MIN: CPT | Performed by: INTERNAL MEDICINE

## 2021-01-18 RX ORDER — FERROUS ASPARTO GLYCINATE, IRON, ASCORBIC ACID, FOLIC ACID, CYANOCOBALAMIN, ZINC, SUCCINIC ACID, AND INTRINSIC FACTOR 50; 100; 60; 750; 250; 25; 15; 50; 100 MG/1; MG/1; MG/1; UG/1; UG/1; UG/1; MG/1; MG/1; MG/1
1 TABLET ORAL DAILY
Qty: 30 TABLET | Refills: 5 | Status: SHIPPED | OUTPATIENT
Start: 2021-01-18 | End: 2021-03-22

## 2021-01-18 NOTE — PROGRESS NOTES
NEPHROLOGY OFFICE VISIT   Sallie Goddard 77 y o  male MRN: [id number]  1/18/2021    Reason for Visit:  Polycystic kidney disease    History of Present Illness (HPI):    I had the pleasure of seeing Sharmin Gibson today in the renal clinic for the continued management of polycystic kidney disease  Since our last visit, there has been no ER visits or hospitilizations  He currently has no complaints at this time and is feeling well  Patient denies any chest pain, shortness of breath and swelling  The last blood work was done on January, which we have reviewed together  ASSESSMENT and PLAN:    1 ) Polycystic kidney disease  - Strong family history with evidence of cysts on both kidneys that are multiple and cyst on the liver  - CTA of the head and neck was negative for aneurysm  No repeat is needed  - Creatinine baseline is around 1-1 2 mg/dL  - Urine analysis is bland with no blood and no protein  -urine protein creatinine ratio is 0 00  -renal function is stable, creatinine 0 999 mg/dL  -reports no hematuria, no flank pain  -renal function stable and at baseline     2 ) Hypertension  - Has a history of a 5 cm thoracic abdominal aortic aneurysm and ideally would aim to keep his systolic blood pressure less than 120 as tolerated  -target blood pressure less than 120/80  - continue metoprolol XL 75 mg daily  -Continue lisinopril 5 mg daily  -blood pressure is under excellent control   - Educated on sodium restriction diet     3 ) Family history of strokes  - CTA was negative for aneurysm  - Old left thalamic infarct      4 ) Iron deficiency  --normal hemoglobin  --start Niferex  --iron saturation 13, ferritin 11    PATIENT INSTRUCTIONS:    Patient Instructions   1 )  Low 2 g sodium diet    2 )  Monitor weights at home    3 )  Avoid NSAIDs (ibuprofen, Motrin, Advil, Aleve, naproxen)    4 )  Monitor blood pressure at home, call if blood pressure greater than 150/90 persistently    5 ) Start Niferex          Orders Placed This Encounter   Procedures    CBC     Standing Status:   Future     Standing Expiration Date:   1/18/2022    Comprehensive metabolic panel     This is a patient instruction: Patient fasting for 8 hours or longer recommended  Standing Status:   Future     Standing Expiration Date:   1/18/2022       OBJECTIVE:  Current Weight: Weight - Scale: 133 kg (294 lb)  Vitals:    01/18/21 1355 01/18/21 1404   BP:  128/80   Weight: 133 kg (294 lb)    Height: 5' 9 17" (1 757 m)     Body mass index is 43 2 kg/m²  REVIEW OF SYSTEMS:    Review of Systems   Constitutional: Negative for activity change and fever  Respiratory: Negative for cough, chest tightness, shortness of breath and wheezing  Cardiovascular: Negative for chest pain and leg swelling  Gastrointestinal: Negative for abdominal pain, diarrhea, nausea and vomiting  Endocrine: Negative for polyuria  Genitourinary: Negative for difficulty urinating, dysuria, flank pain, frequency and urgency  Skin: Negative for rash  Neurological: Negative for dizziness, syncope, light-headedness and headaches  PHYSICAL EXAM:      Physical Exam  Exam conducted with a chaperone present  Constitutional:       General: He is not in acute distress  Appearance: He is well-developed  HENT:      Head: Normocephalic and atraumatic  Eyes:      General: No scleral icterus  Conjunctiva/sclera: Conjunctivae normal       Pupils: Pupils are equal, round, and reactive to light  Neck:      Musculoskeletal: Normal range of motion and neck supple  Cardiovascular:      Rate and Rhythm: Normal rate and regular rhythm  Heart sounds: S1 normal and S2 normal  No murmur  No friction rub  No gallop  Pulmonary:      Effort: Pulmonary effort is normal  No respiratory distress  Breath sounds: Normal breath sounds  No wheezing or rales  Abdominal:      General: Bowel sounds are normal       Palpations: Abdomen is soft  Tenderness:  There is no abdominal tenderness  There is no rebound  Musculoskeletal: Normal range of motion  Skin:     Findings: No rash  Neurological:      Mental Status: He is alert and oriented to person, place, and time     Psychiatric:         Behavior: Behavior normal          Medications:    Current Outpatient Medications:     EPINEPHrine (EPIPEN 2-STEPHANI) 0 3 mg/0 3 mL SOAJ, Inject 0 3 mL (0 3 mg total) into a muscle once for 1 dose Then go to to the ER, Disp: 0 6 mL, Rfl: 0    lisinopril (ZESTRIL) 5 mg tablet, Take 1 tablet (5 mg total) by mouth daily, Disp: 90 tablet, Rfl: 0    metoprolol succinate (TOPROL-XL) 50 mg 24 hr tablet, Take 1 5 tablets (75 mg total) by mouth daily, Disp: 135 tablet, Rfl: 0    Iron Combinations (Niferex) TABS, Take 1 tablet by mouth daily, Disp: 30 tablet, Rfl: 5    Laboratory Results:        Invalid input(s): ALBUMIN    Results for orders placed or performed in visit on 01/04/21   Protein / creatinine ratio, urine   Result Value Ref Range    Creatinine, Ur 66 1 mg/dL    Protein Urine Random 6 mg/dL    Prot/Creat Ratio, Ur 0 09 0 00 - 0 10   Iron Saturation %   Result Value Ref Range    Iron Saturation 13 %    TIBC 491 (H) 250 - 450 ug/dL    Iron 63 (L) 65 - 175 ug/dL   Ferritin   Result Value Ref Range    Ferritin 11 8 - 388 ng/mL   Comprehensive metabolic panel   Result Value Ref Range    Sodium 138 136 - 145 mmol/L    Potassium 4 2 3 5 - 5 3 mmol/L    Chloride 106 100 - 108 mmol/L    CO2 27 21 - 32 mmol/L    ANION GAP 5 4 - 13 mmol/L    BUN 21 5 - 25 mg/dL    Creatinine 1 11 0 60 - 1 30 mg/dL    Glucose, Fasting 102 (H) 65 - 99 mg/dL    Calcium 8 8 8 3 - 10 1 mg/dL    Corrected Calcium 9 3 8 3 - 10 1 mg/dL    AST 19 5 - 45 U/L    ALT 26 12 - 78 U/L    Alkaline Phosphatase 47 46 - 116 U/L    Total Protein 7 1 6 4 - 8 2 g/dL    Albumin 3 4 (L) 3 5 - 5 0 g/dL    Total Bilirubin 0 40 0 20 - 1 00 mg/dL    eGFR 68 ml/min/1 73sq m   CBC and differential   Result Value Ref Range    WBC 5 29 4 31 - 10 16 Thousand/uL    RBC 5 62 3 88 - 5 62 Million/uL    Hemoglobin 14 9 12 0 - 17 0 g/dL    Hematocrit 47 6 36 5 - 49 3 %    MCV 85 82 - 98 fL    MCH 26 5 (L) 26 8 - 34 3 pg    MCHC 31 3 (L) 31 4 - 37 4 g/dL    RDW 13 4 11 6 - 15 1 %    MPV 9 6 8 9 - 12 7 fL    Platelets 013 998 - 380 Thousands/uL    nRBC 0 /100 WBCs    Neutrophils Relative 63 43 - 75 %    Immat GRANS % 1 0 - 2 %    Lymphocytes Relative 20 14 - 44 %    Monocytes Relative 12 4 - 12 %    Eosinophils Relative 3 0 - 6 %    Basophils Relative 1 0 - 1 %    Neutrophils Absolute 3 35 1 85 - 7 62 Thousands/µL    Immature Grans Absolute 0 04 0 00 - 0 20 Thousand/uL    Lymphocytes Absolute 1 04 0 60 - 4 47 Thousands/µL    Monocytes Absolute 0 64 0 17 - 1 22 Thousand/µL    Eosinophils Absolute 0 18 0 00 - 0 61 Thousand/µL    Basophils Absolute 0 04 0 00 - 0 10 Thousands/µL   Lipid Panel with Direct LDL reflex   Result Value Ref Range    Cholesterol 159 50 - 200 mg/dL    Triglycerides 75 <=150 mg/dL    HDL, Direct 42 >=40 mg/dL    LDL Calculated 102 (H) 0 - 100 mg/dL   PSA, Total Screen   Result Value Ref Range    PSA 3 1 0 0 - 4 0 ng/mL

## 2021-01-18 NOTE — PATIENT INSTRUCTIONS
1  )  Low 2 g sodium diet    2 )  Monitor weights at home    3 )  Avoid NSAIDs (ibuprofen, Motrin, Advil, Aleve, naproxen)    4 )  Monitor blood pressure at home, call if blood pressure greater than 150/90 persistently    5 ) Start Niferex

## 2021-02-08 DIAGNOSIS — I10 ESSENTIAL HYPERTENSION: ICD-10-CM

## 2021-02-09 RX ORDER — LISINOPRIL 5 MG/1
5 TABLET ORAL DAILY
Qty: 90 TABLET | Refills: 0 | Status: SHIPPED | OUTPATIENT
Start: 2021-02-09 | End: 2021-05-05 | Stop reason: SDUPTHER

## 2021-02-22 DIAGNOSIS — I10 ESSENTIAL HYPERTENSION: ICD-10-CM

## 2021-02-23 RX ORDER — METOPROLOL SUCCINATE 50 MG/1
75 TABLET, EXTENDED RELEASE ORAL DAILY
Qty: 135 TABLET | Refills: 0 | Status: SHIPPED | OUTPATIENT
Start: 2021-02-23 | End: 2021-05-19

## 2021-02-23 RX ORDER — METOPROLOL SUCCINATE 50 MG/1
75 TABLET, EXTENDED RELEASE ORAL DAILY
Qty: 135 TABLET | Refills: 3 | Status: SHIPPED | OUTPATIENT
Start: 2021-02-23 | End: 2021-05-19

## 2021-03-03 ENCOUNTER — IMMUNIZATIONS (OUTPATIENT)
Dept: FAMILY MEDICINE CLINIC | Facility: HOSPITAL | Age: 66
End: 2021-03-03

## 2021-03-03 DIAGNOSIS — Z23 ENCOUNTER FOR IMMUNIZATION: Primary | ICD-10-CM

## 2021-03-03 PROCEDURE — 91301 SARS-COV-2 / COVID-19 MRNA VACCINE (MODERNA) 100 MCG: CPT

## 2021-03-03 PROCEDURE — 0011A SARS-COV-2 / COVID-19 MRNA VACCINE (MODERNA) 100 MCG: CPT

## 2021-03-22 DIAGNOSIS — D50.9 IRON DEFICIENCY ANEMIA, UNSPECIFIED IRON DEFICIENCY ANEMIA TYPE: Primary | ICD-10-CM

## 2021-03-22 RX ORDER — FERROUS SULFATE 325(65) MG
325 TABLET ORAL
COMMUNITY
End: 2021-03-22 | Stop reason: SDUPTHER

## 2021-03-22 RX ORDER — FERROUS SULFATE 325(65) MG
325 TABLET ORAL
Qty: 90 TABLET | Refills: 3 | Status: SHIPPED | OUTPATIENT
Start: 2021-03-22

## 2021-04-01 ENCOUNTER — IMMUNIZATIONS (OUTPATIENT)
Dept: FAMILY MEDICINE CLINIC | Facility: HOSPITAL | Age: 66
End: 2021-04-01

## 2021-04-01 DIAGNOSIS — Z23 ENCOUNTER FOR IMMUNIZATION: Primary | ICD-10-CM

## 2021-04-01 PROCEDURE — 0012A SARS-COV-2 / COVID-19 MRNA VACCINE (MODERNA) 100 MCG: CPT

## 2021-04-01 PROCEDURE — 91301 SARS-COV-2 / COVID-19 MRNA VACCINE (MODERNA) 100 MCG: CPT

## 2021-05-05 ENCOUNTER — TELEPHONE (OUTPATIENT)
Dept: ADMINISTRATIVE | Facility: HOSPITAL | Age: 66
End: 2021-05-05

## 2021-05-05 DIAGNOSIS — I10 ESSENTIAL HYPERTENSION: ICD-10-CM

## 2021-05-05 RX ORDER — LISINOPRIL 5 MG/1
5 TABLET ORAL DAILY
Qty: 90 TABLET | Refills: 2 | Status: SHIPPED | OUTPATIENT
Start: 2021-05-05 | End: 2021-11-05 | Stop reason: SDUPTHER

## 2021-05-05 NOTE — TELEPHONE ENCOUNTER
Patient is due for an STEPHEN in November  Spoke with patient and he stated he would call us closer to the date  Pt is due for STEPHEN in November  Please call him to schedule  Eleanor Kirkpatrick  to Angela Lei MD        5/5/21 11:39 AM  This message is being sent by Marianela Benjamin on behalf of [address]     Please advise next steps in my care  I was last seen by your PA Jaguar Childress  I do not have a followup appointment scheduled, nor do I have any diagnostic testing scheduled for the popliteal aneurysms in my legs  I called to ask about this, but was transferred by Moisés Willoughby to scheduling before I could ask

## 2021-05-17 DIAGNOSIS — I71.2 THORACIC AORTIC ANEURYSM WITHOUT RUPTURE (HCC): ICD-10-CM

## 2021-05-17 DIAGNOSIS — I72.4 FEMORAL ARTERY ANEURYSM (HCC): Primary | ICD-10-CM

## 2021-05-17 DIAGNOSIS — E78.5 HYPERLIPIDEMIA, UNSPECIFIED HYPERLIPIDEMIA TYPE: ICD-10-CM

## 2021-05-17 DIAGNOSIS — I10 ESSENTIAL HYPERTENSION: ICD-10-CM

## 2021-05-19 ENCOUNTER — OFFICE VISIT (OUTPATIENT)
Dept: CARDIOLOGY CLINIC | Facility: CLINIC | Age: 66
End: 2021-05-19
Payer: MEDICARE

## 2021-05-19 VITALS
WEIGHT: 283.5 LBS | DIASTOLIC BLOOD PRESSURE: 84 MMHG | SYSTOLIC BLOOD PRESSURE: 116 MMHG | HEIGHT: 69 IN | OXYGEN SATURATION: 96 % | BODY MASS INDEX: 41.99 KG/M2 | TEMPERATURE: 97.7 F | HEART RATE: 52 BPM

## 2021-05-19 DIAGNOSIS — I10 ESSENTIAL HYPERTENSION: Primary | ICD-10-CM

## 2021-05-19 DIAGNOSIS — E78.5 HYPERLIPIDEMIA, MILD: ICD-10-CM

## 2021-05-19 DIAGNOSIS — I71.2 THORACIC AORTIC ANEURYSM WITHOUT RUPTURE (HCC): ICD-10-CM

## 2021-05-19 PROCEDURE — 93000 ELECTROCARDIOGRAM COMPLETE: CPT | Performed by: INTERNAL MEDICINE

## 2021-05-19 PROCEDURE — 99214 OFFICE O/P EST MOD 30 MIN: CPT | Performed by: INTERNAL MEDICINE

## 2021-05-19 RX ORDER — METOPROLOL SUCCINATE 50 MG/1
50 TABLET, EXTENDED RELEASE ORAL DAILY
Qty: 90 TABLET | Refills: 2 | Status: SHIPPED | OUTPATIENT
Start: 2021-05-19 | End: 2021-11-05 | Stop reason: SDUPTHER
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
## 2021-07-16 DIAGNOSIS — I71.2 THORACIC AORTIC ANEURYSM WITHOUT RUPTURE (HCC): Primary | ICD-10-CM

## 2021-08-26 ENCOUNTER — OFFICE VISIT (OUTPATIENT)
Dept: CARDIOLOGY CLINIC | Facility: CLINIC | Age: 66
End: 2021-08-26
Payer: MEDICARE

## 2021-08-26 VITALS
SYSTOLIC BLOOD PRESSURE: 130 MMHG | HEART RATE: 60 BPM | WEIGHT: 287 LBS | OXYGEN SATURATION: 95 % | DIASTOLIC BLOOD PRESSURE: 80 MMHG | TEMPERATURE: 97 F | HEIGHT: 69 IN | BODY MASS INDEX: 42.51 KG/M2

## 2021-08-26 DIAGNOSIS — I10 ESSENTIAL HYPERTENSION: Primary | ICD-10-CM

## 2021-08-26 DIAGNOSIS — R42 VERTIGO: ICD-10-CM

## 2021-08-26 DIAGNOSIS — I71.2 THORACIC AORTIC ANEURYSM WITHOUT RUPTURE (HCC): ICD-10-CM

## 2021-08-26 DIAGNOSIS — I72.4 FEMORAL ARTERY ANEURYSM, BILATERAL (HCC): ICD-10-CM

## 2021-08-26 DIAGNOSIS — E78.5 HYPERLIPIDEMIA, MILD: ICD-10-CM

## 2021-08-26 DIAGNOSIS — I67.2 CEREBRAL ATHEROSCLEROSIS: ICD-10-CM

## 2021-08-26 DIAGNOSIS — I72.4 POPLITEAL ARTERY ANEURYSM, BILATERAL (HCC): ICD-10-CM

## 2021-08-26 PROCEDURE — 93000 ELECTROCARDIOGRAM COMPLETE: CPT | Performed by: INTERNAL MEDICINE

## 2021-08-26 PROCEDURE — 99214 OFFICE O/P EST MOD 30 MIN: CPT | Performed by: INTERNAL MEDICINE
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
## 2021-08-30 ENCOUNTER — HOSPITAL ENCOUNTER (OUTPATIENT)
Dept: RADIOLOGY | Facility: HOSPITAL | Age: 66
Discharge: HOME | End: 2021-08-30
Payer: MEDICARE

## 2021-08-30 DIAGNOSIS — I71.2 THORACIC AORTIC ANEURYSM WITHOUT RUPTURE (HCC): ICD-10-CM

## 2021-08-30 PROCEDURE — G1004 CDSM NDSC: HCPCS

## 2021-08-30 PROCEDURE — 71250 CT THORAX DX C-: CPT

## 2021-09-08 ENCOUNTER — OFFICE VISIT (OUTPATIENT)
Dept: CARDIAC SURGERY | Facility: CLINIC | Age: 66
End: 2021-09-08
Payer: MEDICARE

## 2021-09-08 VITALS
WEIGHT: 290.7 LBS | SYSTOLIC BLOOD PRESSURE: 136 MMHG | HEART RATE: 54 BPM | TEMPERATURE: 97.6 F | BODY MASS INDEX: 43.06 KG/M2 | HEIGHT: 69 IN | DIASTOLIC BLOOD PRESSURE: 88 MMHG | RESPIRATION RATE: 18 BRPM | OXYGEN SATURATION: 96 %

## 2021-09-08 DIAGNOSIS — I71.2 THORACIC AORTIC ANEURYSM WITHOUT RUPTURE (HCC): Primary | ICD-10-CM

## 2021-09-08 PROCEDURE — 99213 OFFICE O/P EST LOW 20 MIN: CPT | Performed by: PHYSICIAN ASSISTANT
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
## 2021-10-18 ENCOUNTER — TELEPHONE (OUTPATIENT)
Dept: CARDIOLOGY CLINIC | Facility: CLINIC | Age: 66
End: 2021-10-18

## 2021-10-26 ENCOUNTER — HOSPITAL ENCOUNTER (OUTPATIENT)
Dept: RADIOLOGY | Facility: HOSPITAL | Age: 66
Discharge: HOME | End: 2021-10-26
Attending: INTERNAL MEDICINE
Payer: MEDICARE

## 2021-10-26 ENCOUNTER — IMMUNIZATIONS (OUTPATIENT)
Dept: FAMILY MEDICINE CLINIC | Facility: HOSPITAL | Age: 66
End: 2021-10-26
Payer: MEDICARE

## 2021-10-26 DIAGNOSIS — I67.2 CEREBRAL ATHEROSCLEROSIS: ICD-10-CM

## 2021-10-26 DIAGNOSIS — R42 VERTIGO: ICD-10-CM

## 2021-10-26 DIAGNOSIS — Z23 ENCOUNTER FOR IMMUNIZATION: Primary | ICD-10-CM

## 2021-10-26 PROCEDURE — 93880 EXTRACRANIAL BILAT STUDY: CPT | Performed by: SURGERY

## 2021-10-26 PROCEDURE — 93880 EXTRACRANIAL BILAT STUDY: CPT

## 2021-10-29 ENCOUNTER — TELEPHONE (OUTPATIENT)
Dept: CARDIOLOGY CLINIC | Facility: CLINIC | Age: 66
End: 2021-10-29

## 2021-11-01 ENCOUNTER — APPOINTMENT (OUTPATIENT)
Dept: LAB | Facility: HOSPITAL | Age: 66
End: 2021-11-01
Attending: INTERNAL MEDICINE
Payer: MEDICARE

## 2021-11-01 DIAGNOSIS — Q61.3 POLYCYSTIC KIDNEY DISEASE: ICD-10-CM

## 2021-11-01 DIAGNOSIS — D50.9 IRON DEFICIENCY ANEMIA, UNSPECIFIED IRON DEFICIENCY ANEMIA TYPE: ICD-10-CM

## 2021-11-01 LAB
ALBUMIN SERPL BCP-MCNC: 3.3 G/DL (ref 3.5–5)
ALP SERPL-CCNC: 44 U/L (ref 46–116)
ALT SERPL W P-5'-P-CCNC: 25 U/L (ref 12–78)
ANION GAP SERPL CALCULATED.3IONS-SCNC: 7 MMOL/L (ref 4–13)
AST SERPL W P-5'-P-CCNC: 19 U/L (ref 5–45)
BILIRUB SERPL-MCNC: 0.55 MG/DL (ref 0.2–1)
BUN SERPL-MCNC: 24 MG/DL (ref 5–25)
CALCIUM ALBUM COR SERPL-MCNC: 9.5 MG/DL (ref 8.3–10.1)
CALCIUM SERPL-MCNC: 8.9 MG/DL (ref 8.3–10.1)
CHLORIDE SERPL-SCNC: 107 MMOL/L (ref 100–108)
CO2 SERPL-SCNC: 28 MMOL/L (ref 21–32)
CREAT SERPL-MCNC: 1.13 MG/DL (ref 0.6–1.3)
ERYTHROCYTE [DISTWIDTH] IN BLOOD BY AUTOMATED COUNT: 13.4 % (ref 11.6–15.1)
FERRITIN SERPL-MCNC: 31 NG/ML (ref 8–388)
GFR SERPL CREATININE-BSD FRML MDRD: 66 ML/MIN/1.73SQ M
GLUCOSE P FAST SERPL-MCNC: 110 MG/DL (ref 65–99)
HCT VFR BLD AUTO: 46.1 % (ref 36.5–49.3)
HGB BLD-MCNC: 14.9 G/DL (ref 12–17)
IRON SATN MFR SERPL: 27 % (ref 20–50)
IRON SERPL-MCNC: 108 UG/DL (ref 65–175)
MCH RBC QN AUTO: 28.1 PG (ref 26.8–34.3)
MCHC RBC AUTO-ENTMCNC: 32.3 G/DL (ref 31.4–37.4)
MCV RBC AUTO: 87 FL (ref 82–98)
PLATELET # BLD AUTO: 166 THOUSANDS/UL (ref 149–390)
PMV BLD AUTO: 9.4 FL (ref 8.9–12.7)
POTASSIUM SERPL-SCNC: 4.4 MMOL/L (ref 3.5–5.3)
PROT SERPL-MCNC: 6.8 G/DL (ref 6.4–8.2)
RBC # BLD AUTO: 5.31 MILLION/UL (ref 3.88–5.62)
SODIUM SERPL-SCNC: 142 MMOL/L (ref 136–145)
TIBC SERPL-MCNC: 401 UG/DL (ref 250–450)
WBC # BLD AUTO: 5.24 THOUSAND/UL (ref 4.31–10.16)

## 2021-11-01 PROCEDURE — 36415 COLL VENOUS BLD VENIPUNCTURE: CPT

## 2021-11-01 PROCEDURE — 83540 ASSAY OF IRON: CPT

## 2021-11-01 PROCEDURE — 83550 IRON BINDING TEST: CPT

## 2021-11-01 PROCEDURE — 85027 COMPLETE CBC AUTOMATED: CPT

## 2021-11-01 PROCEDURE — 80053 COMPREHEN METABOLIC PANEL: CPT

## 2021-11-01 PROCEDURE — 82728 ASSAY OF FERRITIN: CPT

## 2021-11-05 ENCOUNTER — HOSPITAL ENCOUNTER (OUTPATIENT)
Dept: RADIOLOGY | Facility: HOSPITAL | Age: 66
Discharge: HOME | End: 2021-11-05
Attending: SURGERY
Payer: MEDICARE

## 2021-11-05 DIAGNOSIS — I72.4 FEMORAL ARTERY ANEURYSM (HCC): ICD-10-CM

## 2021-11-05 DIAGNOSIS — I10 ESSENTIAL HYPERTENSION: ICD-10-CM

## 2021-11-05 DIAGNOSIS — I71.2 THORACIC AORTIC ANEURYSM WITHOUT RUPTURE (HCC): ICD-10-CM

## 2021-11-05 DIAGNOSIS — E78.5 HYPERLIPIDEMIA, UNSPECIFIED HYPERLIPIDEMIA TYPE: ICD-10-CM

## 2021-11-05 PROCEDURE — 93923 UPR/LXTR ART STDY 3+ LVLS: CPT

## 2021-11-05 PROCEDURE — 93925 LOWER EXTREMITY STUDY: CPT

## 2021-11-08 PROCEDURE — 93925 LOWER EXTREMITY STUDY: CPT | Performed by: SURGERY

## 2021-11-08 PROCEDURE — 93922 UPR/L XTREMITY ART 2 LEVELS: CPT | Performed by: SURGERY

## 2021-11-08 RX ORDER — METOPROLOL SUCCINATE 50 MG/1
50 TABLET, EXTENDED RELEASE ORAL DAILY
Qty: 90 TABLET | Refills: 3 | Status: SHIPPED | OUTPATIENT
Start: 2021-11-08

## 2021-11-08 RX ORDER — LISINOPRIL 5 MG/1
5 TABLET ORAL DAILY
Qty: 90 TABLET | Refills: 2 | Status: SHIPPED | OUTPATIENT
Start: 2021-11-08

## 2021-11-24 ENCOUNTER — TELEPHONE (OUTPATIENT)
Dept: NEPHROLOGY | Facility: CLINIC | Age: 66
End: 2021-11-24

## 2021-12-16 ENCOUNTER — OFFICE VISIT (OUTPATIENT)
Dept: FAMILY MEDICINE CLINIC | Facility: CLINIC | Age: 66
End: 2021-12-16
Payer: MEDICARE

## 2021-12-16 VITALS
HEIGHT: 69 IN | HEART RATE: 67 BPM | DIASTOLIC BLOOD PRESSURE: 82 MMHG | RESPIRATION RATE: 18 BRPM | OXYGEN SATURATION: 100 % | WEIGHT: 287.8 LBS | SYSTOLIC BLOOD PRESSURE: 138 MMHG | BODY MASS INDEX: 42.63 KG/M2 | TEMPERATURE: 97.7 F

## 2021-12-16 DIAGNOSIS — I10 ESSENTIAL HYPERTENSION: ICD-10-CM

## 2021-12-16 DIAGNOSIS — Z12.5 SCREENING PSA (PROSTATE SPECIFIC ANTIGEN): ICD-10-CM

## 2021-12-16 DIAGNOSIS — E78.5 HYPERLIPIDEMIA, MILD: ICD-10-CM

## 2021-12-16 DIAGNOSIS — Z00.00 MEDICARE ANNUAL WELLNESS VISIT, SUBSEQUENT: Primary | ICD-10-CM

## 2021-12-16 PROCEDURE — 1123F ACP DISCUSS/DSCN MKR DOCD: CPT | Performed by: FAMILY MEDICINE

## 2021-12-16 PROCEDURE — G0438 PPPS, INITIAL VISIT: HCPCS | Performed by: FAMILY MEDICINE

## 2022-01-10 ENCOUNTER — APPOINTMENT (OUTPATIENT)
Dept: LAB | Facility: HOSPITAL | Age: 67
End: 2022-01-10
Payer: MEDICARE

## 2022-01-10 DIAGNOSIS — E78.5 HYPERLIPIDEMIA, MILD: ICD-10-CM

## 2022-01-10 DIAGNOSIS — I10 ESSENTIAL HYPERTENSION: ICD-10-CM

## 2022-01-10 DIAGNOSIS — Z12.5 SCREENING PSA (PROSTATE SPECIFIC ANTIGEN): ICD-10-CM

## 2022-01-10 LAB
ALBUMIN SERPL BCP-MCNC: 3.4 G/DL (ref 3.5–5)
ALP SERPL-CCNC: 41 U/L (ref 46–116)
ALT SERPL W P-5'-P-CCNC: 29 U/L (ref 12–78)
ANION GAP SERPL CALCULATED.3IONS-SCNC: 9 MMOL/L (ref 4–13)
AST SERPL W P-5'-P-CCNC: 17 U/L (ref 5–45)
BASOPHILS # BLD AUTO: 0.02 THOUSANDS/ΜL (ref 0–0.1)
BASOPHILS NFR BLD AUTO: 0 % (ref 0–1)
BILIRUB SERPL-MCNC: 0.58 MG/DL (ref 0.2–1)
BUN SERPL-MCNC: 19 MG/DL (ref 5–25)
CALCIUM ALBUM COR SERPL-MCNC: 9.4 MG/DL (ref 8.3–10.1)
CALCIUM SERPL-MCNC: 8.9 MG/DL (ref 8.3–10.1)
CHLORIDE SERPL-SCNC: 109 MMOL/L (ref 100–108)
CHOLEST SERPL-MCNC: 154 MG/DL
CO2 SERPL-SCNC: 26 MMOL/L (ref 21–32)
CREAT SERPL-MCNC: 1.12 MG/DL (ref 0.6–1.3)
EOSINOPHIL # BLD AUTO: 0.11 THOUSAND/ΜL (ref 0–0.61)
EOSINOPHIL NFR BLD AUTO: 2 % (ref 0–6)
ERYTHROCYTE [DISTWIDTH] IN BLOOD BY AUTOMATED COUNT: 13.2 % (ref 11.6–15.1)
GFR SERPL CREATININE-BSD FRML MDRD: 67 ML/MIN/1.73SQ M
GLUCOSE P FAST SERPL-MCNC: 104 MG/DL (ref 65–99)
HCT VFR BLD AUTO: 46.7 % (ref 36.5–49.3)
HDLC SERPL-MCNC: 52 MG/DL
HGB BLD-MCNC: 15.2 G/DL (ref 12–17)
IMM GRANULOCYTES # BLD AUTO: 0.03 THOUSAND/UL (ref 0–0.2)
IMM GRANULOCYTES NFR BLD AUTO: 1 % (ref 0–2)
LDLC SERPL CALC-MCNC: 88 MG/DL (ref 0–100)
LYMPHOCYTES # BLD AUTO: 0.75 THOUSANDS/ΜL (ref 0.6–4.47)
LYMPHOCYTES NFR BLD AUTO: 14 % (ref 14–44)
MCH RBC QN AUTO: 28 PG (ref 26.8–34.3)
MCHC RBC AUTO-ENTMCNC: 32.5 G/DL (ref 31.4–37.4)
MCV RBC AUTO: 86 FL (ref 82–98)
MONOCYTES # BLD AUTO: 0.54 THOUSAND/ΜL (ref 0.17–1.22)
MONOCYTES NFR BLD AUTO: 10 % (ref 4–12)
NEUTROPHILS # BLD AUTO: 4.01 THOUSANDS/ΜL (ref 1.85–7.62)
NEUTS SEG NFR BLD AUTO: 73 % (ref 43–75)
NRBC BLD AUTO-RTO: 0 /100 WBCS
PLATELET # BLD AUTO: 186 THOUSANDS/UL (ref 149–390)
PMV BLD AUTO: 9.6 FL (ref 8.9–12.7)
POTASSIUM SERPL-SCNC: 4.5 MMOL/L (ref 3.5–5.3)
PROT SERPL-MCNC: 7 G/DL (ref 6.4–8.2)
PSA SERPL-MCNC: 4.4 NG/ML (ref 0–4)
RBC # BLD AUTO: 5.43 MILLION/UL (ref 3.88–5.62)
SODIUM SERPL-SCNC: 144 MMOL/L (ref 136–145)
TRIGL SERPL-MCNC: 70 MG/DL
WBC # BLD AUTO: 5.46 THOUSAND/UL (ref 4.31–10.16)

## 2022-01-10 PROCEDURE — G0103 PSA SCREENING: HCPCS

## 2022-01-10 PROCEDURE — 80053 COMPREHEN METABOLIC PANEL: CPT

## 2022-01-10 PROCEDURE — 80061 LIPID PANEL: CPT

## 2022-01-10 PROCEDURE — 85025 COMPLETE CBC W/AUTO DIFF WBC: CPT

## 2022-01-10 PROCEDURE — 36415 COLL VENOUS BLD VENIPUNCTURE: CPT

## 2022-01-13 ENCOUNTER — OFFICE VISIT (OUTPATIENT)
Dept: NEPHROLOGY | Facility: CLINIC | Age: 67
End: 2022-01-13
Payer: MEDICARE

## 2022-01-13 VITALS
WEIGHT: 286 LBS | DIASTOLIC BLOOD PRESSURE: 80 MMHG | BODY MASS INDEX: 42.36 KG/M2 | HEIGHT: 69 IN | SYSTOLIC BLOOD PRESSURE: 126 MMHG

## 2022-01-13 DIAGNOSIS — I10 ESSENTIAL HYPERTENSION: ICD-10-CM

## 2022-01-13 DIAGNOSIS — Z86.73 HX OF ISCHEMIC VERTEBROBASILAR ARTERY THALAMIC STROKE: ICD-10-CM

## 2022-01-13 DIAGNOSIS — Q61.3 POLYCYSTIC KIDNEY DISEASE: Primary | ICD-10-CM

## 2022-01-13 PROBLEM — N18.2 BENIGN HYPERTENSION WITH CKD (CHRONIC KIDNEY DISEASE), STAGE II: Status: ACTIVE | Noted: 2022-01-13

## 2022-01-13 PROBLEM — I12.9 BENIGN HYPERTENSION WITH CKD (CHRONIC KIDNEY DISEASE), STAGE II: Status: ACTIVE | Noted: 2022-01-13

## 2022-01-13 PROCEDURE — 99213 OFFICE O/P EST LOW 20 MIN: CPT | Performed by: INTERNAL MEDICINE

## 2022-01-13 NOTE — PROGRESS NOTES
NEPHROLOGY OFFICE VISIT   Itz Goddard 74 y o  male MRN: [id number]  1/13/2022    Reason for Visit:  Polycystic kidney disease, chronic kidney disease    History of Present Illness (HPI):    I had the pleasure of seeing Bri Harper today in the renal clinic for the continued management of polycystic kidney disease  Since our last visit, there has been no ER visits or hospitilizations  He currently has no complaints at this time and is feeling well  Patient denies any chest pain, shortness of breath and swelling  The last blood work was done on January 10th, which we have reviewed together  ASSESSMENT and PLAN:    1 ) Polycystic kidney disease  - Strong family history with evidence of cysts on both kidneys that are multiple and cyst on the liver  - CTA of the head and neck was negative for aneurysm   No repeat is needed  - Creatinine baseline is around 1-1 2 mg/dL  - Urine analysis is bland with no blood and no protein  -evidence of proteinuria  -renal function stable, creatinine 1 1 mg/dL  -reports no hematuria, no flank pain  -renal function stable and at baseline     2 ) Hypertension  - Has a history of a 5 cm thoracic abdominal aortic aneurysm and ideally would aim to keep his systolic blood pressure less than 120 as tolerated  -target blood pressure less than 120/80  - continue metoprolol XL 75 mg daily  -Continue lisinopril 5 mg daily  -blood pressure is under excellent control   - Educated on sodium restriction diet     3 ) Family history of strokes  - CTA was negative for aneurysm  - Old left thalamic infarct      4 ) Iron deficiency  --normal hemoglobin  --ferrous sulfate  --iron saturation 13, ferritin 11    5 ) Chronic kidney disease stage II  --baseline creatinine low 1s, 0 9-1 2 mg/dL  --setting of polycystic kidney disease and hypertension  --renal function has been quite stable for 5 years    6 ) Elevated PSA  --asymptomatic  --follow-up with Urology      PATIENT INSTRUCTIONS:    Patient Instructions 1  )  Low 2 g sodium diet    2 )  Monitor weights at home    3 )  Avoid NSAIDs (ibuprofen, Motrin, Advil, Aleve, naproxen)    4 )  Monitor blood pressure at home, call if blood pressure greater than 150/90 persistently    5 ) I will plan to discuss all results including blood work, and/or imaging at our next visit, unless there is an urgent indication, in which case I will call you earlier  If you have any questions or concerns about your results, please feel free to call our office  Orders Placed This Encounter   Procedures    Comprehensive metabolic panel     This is a patient instruction: Patient fasting for 8 hours or longer recommended  Standing Status:   Future     Standing Expiration Date:   1/13/2023    CBC     Standing Status:   Future     Standing Expiration Date:   1/13/2023    Urinalysis with microscopic     Standing Status:   Future     Standing Expiration Date:   1/13/2023       OBJECTIVE:  Current Weight: Weight - Scale: 130 kg (286 lb)  Vitals:    01/13/22 1119 01/13/22 1132   BP:  126/80   Weight: 130 kg (286 lb)    Height: 5' 9" (1 753 m)     Body mass index is 42 23 kg/m²  REVIEW OF SYSTEMS:    Review of Systems   Constitutional: Negative for activity change and fever  Respiratory: Negative for cough, chest tightness, shortness of breath and wheezing  Cardiovascular: Negative for chest pain and leg swelling  Gastrointestinal: Negative for abdominal pain, diarrhea, nausea and vomiting  Endocrine: Negative for polyuria  Genitourinary: Negative for difficulty urinating, dysuria, flank pain, frequency and urgency  Skin: Negative for rash  Neurological: Negative for dizziness, syncope, light-headedness and headaches  PHYSICAL EXAM:      Physical Exam  Vitals and nursing note reviewed  Constitutional:       General: He is not in acute distress  Appearance: He is well-developed  HENT:      Head: Normocephalic and atraumatic     Eyes:      General: No scleral icterus  Conjunctiva/sclera: Conjunctivae normal       Pupils: Pupils are equal, round, and reactive to light  Cardiovascular:      Rate and Rhythm: Normal rate and regular rhythm  Heart sounds: S1 normal and S2 normal  No murmur heard  No friction rub  No gallop  Pulmonary:      Effort: Pulmonary effort is normal  No respiratory distress  Breath sounds: Normal breath sounds  No wheezing or rales  Abdominal:      General: Bowel sounds are normal       Palpations: Abdomen is soft  Tenderness: There is no abdominal tenderness  There is no rebound  Musculoskeletal:         General: Normal range of motion  Cervical back: Normal range of motion and neck supple  Skin:     Findings: No rash  Neurological:      Mental Status: He is alert and oriented to person, place, and time     Psychiatric:         Behavior: Behavior normal          Medications:    Current Outpatient Medications:     ferrous sulfate 325 (65 Fe) mg tablet, Take 1 tablet (325 mg total) by mouth daily with breakfast, Disp: 90 tablet, Rfl: 3    lisinopril (ZESTRIL) 5 mg tablet, Take 1 tablet (5 mg total) by mouth daily, Disp: 90 tablet, Rfl: 2    metoprolol succinate (TOPROL-XL) 50 mg 24 hr tablet, Take 1 tablet (50 mg total) by mouth daily, Disp: 90 tablet, Rfl: 3    EPINEPHrine (EPIPEN 2-STEPHANI) 0 3 mg/0 3 mL SOAJ, Inject 0 3 mL (0 3 mg total) into a muscle once for 1 dose Then go to to the ER, Disp: 0 6 mL, Rfl: 0    Laboratory Results:  Results from last 7 days   Lab Units 01/10/22  1048   WBC Thousand/uL 5 46   HEMOGLOBIN g/dL 15 2   HEMATOCRIT % 46 7   PLATELETS Thousands/uL 186   POTASSIUM mmol/L 4 5   CHLORIDE mmol/L 109*   CO2 mmol/L 26   BUN mg/dL 19   CREATININE mg/dL 1 12   CALCIUM mg/dL 8 9       Results for orders placed or performed in visit on 01/10/22   Comprehensive metabolic panel   Result Value Ref Range    Sodium 144 136 - 145 mmol/L    Potassium 4 5 3 5 - 5 3 mmol/L    Chloride 109 (H) 100 - 108 mmol/L    CO2 26 21 - 32 mmol/L    ANION GAP 9 4 - 13 mmol/L    BUN 19 5 - 25 mg/dL    Creatinine 1 12 0 60 - 1 30 mg/dL    Glucose, Fasting 104 (H) 65 - 99 mg/dL    Calcium 8 9 8 3 - 10 1 mg/dL    Corrected Calcium 9 4 8 3 - 10 1 mg/dL    AST 17 5 - 45 U/L    ALT 29 12 - 78 U/L    Alkaline Phosphatase 41 (L) 46 - 116 U/L    Total Protein 7 0 6 4 - 8 2 g/dL    Albumin 3 4 (L) 3 5 - 5 0 g/dL    Total Bilirubin 0 58 0 20 - 1 00 mg/dL    eGFR 67 ml/min/1 73sq m   Lipid Panel with Direct LDL reflex   Result Value Ref Range    Cholesterol 154 See Comment mg/dL    Triglycerides 70 See Comment mg/dL    HDL, Direct 52 >=40 mg/dL    LDL Calculated 88 0 - 100 mg/dL   CBC and differential   Result Value Ref Range    WBC 5 46 4 31 - 10 16 Thousand/uL    RBC 5 43 3 88 - 5 62 Million/uL    Hemoglobin 15 2 12 0 - 17 0 g/dL    Hematocrit 46 7 36 5 - 49 3 %    MCV 86 82 - 98 fL    MCH 28 0 26 8 - 34 3 pg    MCHC 32 5 31 4 - 37 4 g/dL    RDW 13 2 11 6 - 15 1 %    MPV 9 6 8 9 - 12 7 fL    Platelets 907 705 - 338 Thousands/uL    nRBC 0 /100 WBCs    Neutrophils Relative 73 43 - 75 %    Immat GRANS % 1 0 - 2 %    Lymphocytes Relative 14 14 - 44 %    Monocytes Relative 10 4 - 12 %    Eosinophils Relative 2 0 - 6 %    Basophils Relative 0 0 - 1 %    Neutrophils Absolute 4 01 1 85 - 7 62 Thousands/µL    Immature Grans Absolute 0 03 0 00 - 0 20 Thousand/uL    Lymphocytes Absolute 0 75 0 60 - 4 47 Thousands/µL    Monocytes Absolute 0 54 0 17 - 1 22 Thousand/µL    Eosinophils Absolute 0 11 0 00 - 0 61 Thousand/µL    Basophils Absolute 0 02 0 00 - 0 10 Thousands/µL   PSA, Total Screen   Result Value Ref Range    PSA 4 4 (H) 0 0 - 4 0 ng/mL

## 2022-02-07 NOTE — TELEPHONE ENCOUNTER
Error What Type Of Note Output Would You Prefer (Optional)?: Standard Output How Severe Is Your Rash?: moderate Is This A New Presentation, Or A Follow-Up?: Rash Additional History: Patient presents today for a rash on her vagina and inner thighs. She stated this has been going on for about 4 years, she has been to previous dermatologist who diagnosed her with “Hidradenitis supperativa” and was prescribed Doxycycline,clindamycin and change of Birth Control. She stated none of these helped and it’s getting to a point it hurts to sit sometimes.

## 2022-02-16 ENCOUNTER — OFFICE VISIT (OUTPATIENT)
Dept: UROLOGY | Facility: AMBULATORY SURGERY CENTER | Age: 67
End: 2022-02-16
Payer: MEDICARE

## 2022-02-16 ENCOUNTER — TELEPHONE (OUTPATIENT)
Dept: UROLOGY | Facility: AMBULATORY SURGERY CENTER | Age: 67
End: 2022-02-16

## 2022-02-16 VITALS
SYSTOLIC BLOOD PRESSURE: 134 MMHG | DIASTOLIC BLOOD PRESSURE: 86 MMHG | BODY MASS INDEX: 42.51 KG/M2 | WEIGHT: 287 LBS | HEIGHT: 69 IN | HEART RATE: 77 BPM

## 2022-02-16 DIAGNOSIS — Z12.5 SCREENING FOR PROSTATE CANCER: ICD-10-CM

## 2022-02-16 DIAGNOSIS — R97.20 ELEVATED PSA: Primary | ICD-10-CM

## 2022-02-16 DIAGNOSIS — N20.0 CALCULUS OF KIDNEY: ICD-10-CM

## 2022-02-16 PROCEDURE — 99213 OFFICE O/P EST LOW 20 MIN: CPT | Performed by: UROLOGY
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
## 2022-02-16 NOTE — TELEPHONE ENCOUNTER
Pt seen by Eliazar Tabor today 2/16,    Return in about 1 month (around 3/16/2022) for 1 month with Eliazar Tabor, ok to overbook  Pt needs an AM appt  & said any day of the week would work, thanks!

## 2022-02-16 NOTE — ASSESSMENT & PLAN NOTE
The patient has an elevated PSA  We discussed PSA as a screening test for prostate cancer and its limitations  We also discussed that we look at the PSA number and also its trend over time as well as its number in relation to the size of the prostate to better assess the risk it confers  Based on our findings thus far our options are to repeat a PSA in a short interval versus performing biopsy of prostate or getting an MRI of the prostate (althought he cannot get this because of his prior medical history)  The patient elects to recheck a PSA and will follow-up with us after  I will try to call him when the results come in but asked him to call us if does not hear from me     Also have set up appointment in 1 month as a fallback plan

## 2022-02-21 ENCOUNTER — APPOINTMENT (OUTPATIENT)
Dept: LAB | Facility: HOSPITAL | Age: 67
End: 2022-02-21
Payer: MEDICARE

## 2022-02-21 DIAGNOSIS — R97.20 ELEVATED PSA: ICD-10-CM

## 2022-02-21 LAB — PSA SERPL-MCNC: 5.5 NG/ML (ref 0–4)

## 2022-02-21 PROCEDURE — 84153 ASSAY OF PSA TOTAL: CPT

## 2022-03-21 ENCOUNTER — TELEPHONE (OUTPATIENT)
Dept: UROLOGY | Facility: CLINIC | Age: 67
End: 2022-03-21

## 2022-03-21 ENCOUNTER — OFFICE VISIT (OUTPATIENT)
Dept: UROLOGY | Facility: CLINIC | Age: 67
End: 2022-03-21
Payer: MEDICARE

## 2022-03-21 VITALS
HEIGHT: 69 IN | WEIGHT: 291 LBS | HEART RATE: 55 BPM | OXYGEN SATURATION: 97 % | RESPIRATION RATE: 18 BRPM | SYSTOLIC BLOOD PRESSURE: 134 MMHG | BODY MASS INDEX: 43.1 KG/M2 | DIASTOLIC BLOOD PRESSURE: 78 MMHG

## 2022-03-21 DIAGNOSIS — R97.20 ELEVATED PSA: Primary | ICD-10-CM

## 2022-03-21 PROCEDURE — 99214 OFFICE O/P EST MOD 30 MIN: CPT | Performed by: UROLOGY

## 2022-03-21 RX ORDER — DIAZEPAM 5 MG/1
5 TABLET ORAL EVERY 6 HOURS PRN
Qty: 1 TABLET | Refills: 0 | Status: SHIPPED | OUTPATIENT
Start: 2022-03-21
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
## 2022-03-21 NOTE — TELEPHONE ENCOUNTER
Patient last seen Dr Dakotah Meza, patient is scheduled for MRI on 4/28/22 and would need a bun creatinine ordered

## 2022-03-24 NOTE — ASSESSMENT & PLAN NOTE
We discussed options for persistently elevated PSA including observation vs biopsy vs MRI and agreed on plan for MRI  He should be safe for this based on card he has regarding embolization of spleen which says he can get 3T MRI

## 2022-04-28 ENCOUNTER — HOSPITAL ENCOUNTER (OUTPATIENT)
Dept: RADIOLOGY | Facility: HOSPITAL | Age: 67
Discharge: HOME | End: 2022-04-28
Payer: MEDICARE

## 2022-04-28 DIAGNOSIS — R97.20 ELEVATED PSA: ICD-10-CM

## 2022-04-28 PROCEDURE — 76377 3D RENDER W/INTRP POSTPROCES: CPT

## 2022-04-28 PROCEDURE — 72197 MRI PELVIS W/O & W/DYE: CPT

## 2022-04-28 PROCEDURE — A9585 GADOBUTROL INJECTION: HCPCS | Performed by: UROLOGY

## 2022-04-28 RX ADMIN — GADOBUTROL 13 ML: 604.72 INJECTION INTRAVENOUS at 08:56

## 2022-05-06 ENCOUNTER — OFFICE VISIT (OUTPATIENT)
Dept: UROLOGY | Facility: CLINIC | Age: 67
End: 2022-05-06
Payer: MEDICARE

## 2022-05-06 VITALS
BODY MASS INDEX: 42.8 KG/M2 | WEIGHT: 289 LBS | HEIGHT: 69 IN | DIASTOLIC BLOOD PRESSURE: 90 MMHG | SYSTOLIC BLOOD PRESSURE: 130 MMHG | HEART RATE: 59 BPM

## 2022-05-06 DIAGNOSIS — N20.0 KIDNEY STONES: Primary | ICD-10-CM

## 2022-05-06 DIAGNOSIS — R97.20 ELEVATED PSA: ICD-10-CM

## 2022-05-06 LAB
SL AMB  POCT GLUCOSE, UA: ABNORMAL
SL AMB LEUKOCYTE ESTERASE,UA: ABNORMAL
SL AMB POCT BILIRUBIN,UA: ABNORMAL
SL AMB POCT BLOOD,UA: ABNORMAL
SL AMB POCT CLARITY,UA: CLEAR
SL AMB POCT COLOR,UA: YELLOW
SL AMB POCT KETONES,UA: ABNORMAL
SL AMB POCT NITRITE,UA: ABNORMAL
SL AMB POCT PH,UA: 5
SL AMB POCT SPECIFIC GRAVITY,UA: 1.03
SL AMB POCT URINE PROTEIN: ABNORMAL
SL AMB POCT UROBILINOGEN: 0.2

## 2022-05-06 PROCEDURE — 99213 OFFICE O/P EST LOW 20 MIN: CPT | Performed by: PHYSICIAN ASSISTANT

## 2022-05-06 PROCEDURE — 81002 URINALYSIS NONAUTO W/O SCOPE: CPT | Performed by: PHYSICIAN ASSISTANT
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
## 2022-05-20 DIAGNOSIS — Z91.030 ALLERGIC TO BEES: ICD-10-CM

## 2022-05-20 RX ORDER — EPINEPHRINE 0.3 MG/.3ML
0.3 INJECTION SUBCUTANEOUS ONCE
Qty: 0.6 ML | Refills: 0 | Status: SHIPPED | OUTPATIENT
Start: 2022-05-20 | End: 2022-05-20

## 2022-05-20 NOTE — TELEPHONE ENCOUNTER
Medication refill requested: Epinephrine  Last office visit: 12/16/2021  Next office visit: 12/19/2022  Last refilled: 10/03/2019  Labs: No  Ordering Provider: [facility] (select pharmacy send RX to):   [address]) [place] [address]  [address] [address]  Phone: [phone] Fax: [phone]

## 2022-08-15 ENCOUNTER — APPOINTMENT (OUTPATIENT)
Dept: LAB | Facility: HOSPITAL | Age: 67
End: 2022-08-15
Payer: MEDICARE

## 2022-08-15 DIAGNOSIS — R97.20 ELEVATED PSA: ICD-10-CM

## 2022-08-15 LAB
ANION GAP SERPL CALCULATED.3IONS-SCNC: 8 MMOL/L (ref 4–13)
BUN SERPL-MCNC: 17 MG/DL (ref 5–25)
CALCIUM SERPL-MCNC: 8.9 MG/DL (ref 8.3–10.1)
CHLORIDE SERPL-SCNC: 105 MMOL/L (ref 96–108)
CO2 SERPL-SCNC: 29 MMOL/L (ref 21–32)
CREAT SERPL-MCNC: 1.03 MG/DL (ref 0.6–1.3)
GFR SERPL CREATININE-BSD FRML MDRD: 73 ML/MIN/1.73SQ M
GLUCOSE P FAST SERPL-MCNC: 94 MG/DL (ref 65–99)
POTASSIUM SERPL-SCNC: 4.6 MMOL/L (ref 3.5–5.3)
SODIUM SERPL-SCNC: 142 MMOL/L (ref 135–147)

## 2022-08-15 PROCEDURE — 36415 COLL VENOUS BLD VENIPUNCTURE: CPT

## 2022-08-15 PROCEDURE — 80048 BASIC METABOLIC PNL TOTAL CA: CPT

## 2022-08-15 PROCEDURE — 84153 ASSAY OF PSA TOTAL: CPT

## 2022-08-15 PROCEDURE — 84154 ASSAY OF PSA FREE: CPT

## 2022-08-18 LAB
PSA FREE MFR SERPL: 29 %
PSA FREE SERPL-MCNC: 1.45 NG/ML
PSA SERPL-MCNC: 5 NG/ML (ref 0–4)

## 2022-08-30 ENCOUNTER — OFFICE VISIT (OUTPATIENT)
Dept: UROLOGY | Facility: CLINIC | Age: 67
End: 2022-08-30
Payer: MEDICARE

## 2022-08-30 VITALS
DIASTOLIC BLOOD PRESSURE: 80 MMHG | OXYGEN SATURATION: 98 % | SYSTOLIC BLOOD PRESSURE: 132 MMHG | HEIGHT: 69 IN | BODY MASS INDEX: 44.43 KG/M2 | WEIGHT: 300 LBS | HEART RATE: 61 BPM

## 2022-08-30 DIAGNOSIS — R97.20 ELEVATED PSA: Primary | ICD-10-CM

## 2022-08-30 DIAGNOSIS — N20.0 KIDNEY STONES: ICD-10-CM

## 2022-08-30 PROCEDURE — 99213 OFFICE O/P EST LOW 20 MIN: CPT | Performed by: PHYSICIAN ASSISTANT
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
## 2022-09-15 ENCOUNTER — OFFICE VISIT (OUTPATIENT)
Dept: CARDIOLOGY CLINIC | Facility: CLINIC | Age: 67
End: 2022-09-15
Payer: MEDICARE

## 2022-09-15 VITALS
BODY MASS INDEX: 44.33 KG/M2 | DIASTOLIC BLOOD PRESSURE: 80 MMHG | SYSTOLIC BLOOD PRESSURE: 128 MMHG | WEIGHT: 300.2 LBS | HEART RATE: 58 BPM

## 2022-09-15 DIAGNOSIS — I67.2 CEREBRAL ATHEROSCLEROSIS: ICD-10-CM

## 2022-09-15 DIAGNOSIS — I10 ESSENTIAL HYPERTENSION: Primary | ICD-10-CM

## 2022-09-15 DIAGNOSIS — E78.5 HYPERLIPIDEMIA, MILD: ICD-10-CM

## 2022-09-15 DIAGNOSIS — I71.20 THORACIC AORTIC ANEURYSM WITHOUT RUPTURE: ICD-10-CM

## 2022-09-15 PROCEDURE — 93000 ELECTROCARDIOGRAM COMPLETE: CPT | Performed by: INTERNAL MEDICINE

## 2022-09-15 PROCEDURE — 99214 OFFICE O/P EST MOD 30 MIN: CPT | Performed by: INTERNAL MEDICINE
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
## 2022-09-26 DIAGNOSIS — I10 ESSENTIAL HYPERTENSION: ICD-10-CM

## 2022-09-26 RX ORDER — METOPROLOL SUCCINATE 50 MG/1
TABLET, EXTENDED RELEASE ORAL
Qty: 90 TABLET | Refills: 3 | Status: SHIPPED | OUTPATIENT
Start: 2022-09-26

## 2022-09-28 DIAGNOSIS — I10 ESSENTIAL HYPERTENSION: ICD-10-CM

## 2022-09-28 RX ORDER — LISINOPRIL 5 MG/1
TABLET ORAL
Qty: 90 TABLET | Refills: 3 | Status: SHIPPED | OUTPATIENT
Start: 2022-09-28

## 2022-12-17 ENCOUNTER — APPOINTMENT (OUTPATIENT)
Dept: LAB | Facility: HOSPITAL | Age: 67
End: 2022-12-17

## 2022-12-17 DIAGNOSIS — Q61.3 POLYCYSTIC KIDNEY DISEASE: ICD-10-CM

## 2022-12-17 LAB
ALBUMIN SERPL BCP-MCNC: 3.4 G/DL (ref 3.5–5)
ALP SERPL-CCNC: 40 U/L (ref 46–116)
ALT SERPL W P-5'-P-CCNC: 31 U/L (ref 12–78)
ANION GAP SERPL CALCULATED.3IONS-SCNC: 4 MMOL/L (ref 4–13)
AST SERPL W P-5'-P-CCNC: 22 U/L (ref 5–45)
BACTERIA UR QL AUTO: ABNORMAL /HPF
BILIRUB SERPL-MCNC: 0.66 MG/DL (ref 0.2–1)
BILIRUB UR QL STRIP: NEGATIVE
BUN SERPL-MCNC: 22 MG/DL (ref 5–25)
CALCIUM ALBUM COR SERPL-MCNC: 9.6 MG/DL (ref 8.3–10.1)
CALCIUM SERPL-MCNC: 9.1 MG/DL (ref 8.3–10.1)
CHLORIDE SERPL-SCNC: 110 MMOL/L (ref 96–108)
CLARITY UR: CLEAR
CO2 SERPL-SCNC: 27 MMOL/L (ref 21–32)
COLOR UR: YELLOW
CREAT SERPL-MCNC: 1.05 MG/DL (ref 0.6–1.3)
ERYTHROCYTE [DISTWIDTH] IN BLOOD BY AUTOMATED COUNT: 13.1 % (ref 11.6–15.1)
GFR SERPL CREATININE-BSD FRML MDRD: 72 ML/MIN/1.73SQ M
GLUCOSE P FAST SERPL-MCNC: 113 MG/DL (ref 65–99)
GLUCOSE UR STRIP-MCNC: NEGATIVE MG/DL
HCT VFR BLD AUTO: 47.2 % (ref 36.5–49.3)
HGB BLD-MCNC: 15.3 G/DL (ref 12–17)
HGB UR QL STRIP.AUTO: ABNORMAL
KETONES UR STRIP-MCNC: NEGATIVE MG/DL
LEUKOCYTE ESTERASE UR QL STRIP: NEGATIVE
MCH RBC QN AUTO: 27.9 PG (ref 26.8–34.3)
MCHC RBC AUTO-ENTMCNC: 32.4 G/DL (ref 31.4–37.4)
MCV RBC AUTO: 86 FL (ref 82–98)
NITRITE UR QL STRIP: NEGATIVE
NON-SQ EPI CELLS URNS QL MICRO: ABNORMAL /HPF
PH UR STRIP.AUTO: 6 [PH]
PLATELET # BLD AUTO: 144 THOUSANDS/UL (ref 149–390)
PMV BLD AUTO: 9.5 FL (ref 8.9–12.7)
POTASSIUM SERPL-SCNC: 4.8 MMOL/L (ref 3.5–5.3)
PROT SERPL-MCNC: 6.9 G/DL (ref 6.4–8.4)
PROT UR STRIP-MCNC: NEGATIVE MG/DL
RBC # BLD AUTO: 5.49 MILLION/UL (ref 3.88–5.62)
RBC #/AREA URNS AUTO: ABNORMAL /HPF
SODIUM SERPL-SCNC: 141 MMOL/L (ref 135–147)
SP GR UR STRIP.AUTO: 1.02 (ref 1–1.03)
UROBILINOGEN UR QL STRIP.AUTO: 0.2 E.U./DL
WBC # BLD AUTO: 6.22 THOUSAND/UL (ref 4.31–10.16)
WBC #/AREA URNS AUTO: ABNORMAL /HPF

## 2022-12-19 ENCOUNTER — OFFICE VISIT (OUTPATIENT)
Dept: FAMILY MEDICINE CLINIC | Facility: CLINIC | Age: 67
End: 2022-12-19

## 2022-12-19 VITALS
HEART RATE: 56 BPM | TEMPERATURE: 97.2 F | WEIGHT: 302 LBS | BODY MASS INDEX: 44.73 KG/M2 | SYSTOLIC BLOOD PRESSURE: 124 MMHG | DIASTOLIC BLOOD PRESSURE: 82 MMHG | HEIGHT: 69 IN | OXYGEN SATURATION: 96 %

## 2022-12-19 DIAGNOSIS — E78.5 HYPERLIPIDEMIA, MILD: Primary | ICD-10-CM

## 2022-12-19 DIAGNOSIS — Z23 ENCOUNTER FOR IMMUNIZATION: ICD-10-CM

## 2022-12-19 DIAGNOSIS — Z12.11 SCREEN FOR COLON CANCER: ICD-10-CM
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
## 2023-01-05 ENCOUNTER — TELEPHONE (OUTPATIENT)
Dept: NEPHROLOGY | Facility: CLINIC | Age: 68
End: 2023-01-05

## 2023-01-05 ENCOUNTER — OFFICE VISIT (OUTPATIENT)
Dept: NEPHROLOGY | Facility: CLINIC | Age: 68
End: 2023-01-05

## 2023-01-05 VITALS
BODY MASS INDEX: 44.73 KG/M2 | HEIGHT: 69 IN | SYSTOLIC BLOOD PRESSURE: 126 MMHG | WEIGHT: 302 LBS | DIASTOLIC BLOOD PRESSURE: 82 MMHG | HEART RATE: 56 BPM

## 2023-01-05 DIAGNOSIS — I10 ESSENTIAL HYPERTENSION: ICD-10-CM

## 2023-01-05 DIAGNOSIS — N18.2 BENIGN HYPERTENSION WITH CKD (CHRONIC KIDNEY DISEASE), STAGE II: ICD-10-CM

## 2023-01-05 DIAGNOSIS — I12.9 BENIGN HYPERTENSION WITH CKD (CHRONIC KIDNEY DISEASE), STAGE II: ICD-10-CM

## 2023-01-05 DIAGNOSIS — Q61.3 POLYCYSTIC KIDNEY DISEASE: Primary | ICD-10-CM

## 2023-01-05 NOTE — TELEPHONE ENCOUNTER
Patient's wife called because patient is scheduled to have an MRI w w/o contrast   She said that [address] had been previously told her can't have contrast due to his kidneys so she wanted to make sure that the right test was ordered

## 2023-01-05 NOTE — PROGRESS NOTES
NEPHROLOGY OFFICE VISIT   Areli Goddard 69 y o  male MRN: [id number]  1/5/2023    Reason for Visit: Chronic kidney disease    History of Present Illness (HPI):    I had the pleasure of seeing Sony Cabezas today in the renal clinic for the continued management of chronic kidney disease  Since our last visit, there has been no ER visits or hospitilizations  He currently has no complaints at this time and is feeling well  Patient denies any chest pain, shortness of breath and swelling  The last blood work was done on December, which we have reviewed together  He has gained a good amount of weight due to dietary indiscretion  He also admits to eating a lot more salt  No new complaints  We discussed about obtaining an MRI of this year to reevaluate his polycystic kidney disease and calculate total kidney volume  ASSESSMENT and PLAN:    1 ) Polycystic kidney disease  - Strong family history with evidence of cysts on both kidneys that are multiple and cyst on the liver  - CTA of the head and neck was negative for aneurysm  No repeat is needed  - Creatinine baseline is around 1-1 2 mg/dL  - Urine analysis is bland with no blood and no protein  -evidence of proteinuria  -renal function stable, creatinine 1 05 mg/dL  -reports no hematuria, no flank pain  -renal function stable and at baseline  -Check RI this year to calculate total kidney volume and reevaluate the renal cyst     2 ) Hypertension  - Has a history of a 5 cm thoracic abdominal aortic aneurysm and ideally would aim to keep his systolic blood pressure less than 120 as tolerated  -target blood pressure less than 120/80  - continue metoprolol XL 75 mg daily  -Continue lisinopril 5 mg daily  -blood pressure is under excellent control   - Educated on sodium restriction diet     3 ) Family history of strokes  - CTA was negative for aneurysm  - Old left thalamic infarct      4  ) Iron deficiency--resolved     5 ) Chronic kidney disease stage II  --baseline creatinine low 1s, 0 9-1 2 mg/dL  --setting of polycystic kidney disease and hypertension  -- No function remained stable     6 ) Elevated PSA  --asymptomatic  --follow-up with Urology  -- MRI of the prostate: PI-RADSv2 1 category 2-low significance for cancer  Calculated prostate volume 73 cc      PATIENT INSTRUCTIONS:    Patient Instructions   1 )  Low 2 g sodium diet    2 )  Monitor weights at home    3 )  Avoid NSAIDs (ibuprofen, Motrin, Advil, Aleve, naproxen)    4 )  Monitor blood pressure at home, call if blood pressure greater than 150/90 persistently    5 ) I will plan to discuss all results including blood work, and/or imaging at our next visit, unless there is an urgent indication, in which case I will call you earlier  If you have any questions or concerns about your results, please feel free to call our office  6 ) Plan for MRI this year, call me if you need something for anxiety when you have it planned          Orders Placed This Encounter   Procedures   • MRI abdomen w wo contrast     Standing Status:   Future     Standing Expiration Date:   1/5/2027     Scheduling Instructions: There is no preparation for this test  Please leave your jewelry and valuables at home, wedding rings are the exception  All patients will be required to change into a hospital gown and pants  Street clothes are not permitted in the MRI  Magnetic nail polish must be removed prior to arrival for your test  Please bring your insurance cards, a form of photo ID and a list of your medications with you  Arrive 15 minutes prior to your appointment time in order to register  Please bring any prior CT or MRI studies of this area that were not performed at a [facility]  To schedule this appointment, please contact Central Scheduling at [phone]  Prior to your appointment, please make sure you complete the MRI Screening Form when you e-Check in for your appointment   This will be available starting 7 days before your appointment in [address]  You may receive an e-mail with an activation code if you do not have a [employer] account  If you do not have access to a device, we will complete your screening at your appointment  Order Specific Question:   What is the patient's sedation requirement? Answer:   No Sedation     Order Specific Question:   Does the patient have metallic implants? Answer:   No     Order Specific Question:   Release to patient through [employer]     Answer:   Immediate     Order Specific Question:   Is order priority selected as STAT? Answer:   No     Order Specific Question:   Reason for Exam (FREE TEXT)     Answer:   Polycystic kidney disease  Please calculate TKV     Order Specific Question:   When should the test be performed? Answer:   Elective- non urgent   • Cystatin C With eGFR     Standing Status:   Future     Standing Expiration Date:   1/5/2024   • Comprehensive metabolic panel     This is a patient instruction: Patient fasting for 8 hours or longer recommended  Standing Status:   Future     Standing Expiration Date:   1/5/2024   • CBC     Standing Status:   Future     Standing Expiration Date:   1/5/2024   • Urinalysis with microscopic     Standing Status:   Future     Standing Expiration Date:   1/5/2024       OBJECTIVE:  Current Weight: Weight - Scale: (!) 137 kg (302 lb)  Vitals:    01/05/23 0948   BP: 126/82   BP Location: Left arm   Patient Position: Sitting   Cuff Size: Large   Pulse: 56   Weight: (!) 137 kg (302 lb)   Height: 5' 9" (1 753 m)    Body mass index is 44 6 kg/m²  REVIEW OF SYSTEMS:    Review of Systems   Constitutional: Negative for activity change and fever  Respiratory: Negative for cough, chest tightness, shortness of breath and wheezing  Cardiovascular: Negative for chest pain and leg swelling  Gastrointestinal: Negative for abdominal pain, diarrhea, nausea and vomiting  Endocrine: Negative for polyuria  Genitourinary: Negative for difficulty urinating, dysuria, flank pain, frequency and urgency  Skin: Negative for rash  Neurological: Negative for dizziness, syncope, light-headedness and headaches  PHYSICAL EXAM:      Physical Exam  Vitals and nursing note reviewed  Constitutional:       General: He is not in acute distress  Appearance: He is well-developed  He is obese  HENT:      Head: Normocephalic and atraumatic  Eyes:      General: No scleral icterus  Conjunctiva/sclera: Conjunctivae normal       Pupils: Pupils are equal, round, and reactive to light  Cardiovascular:      Rate and Rhythm: Normal rate and regular rhythm  Heart sounds: S1 normal and S2 normal  No murmur heard  No friction rub  No gallop  Pulmonary:      Effort: Pulmonary effort is normal  No respiratory distress  Breath sounds: Normal breath sounds  No wheezing or rales  Abdominal:      General: Bowel sounds are normal       Palpations: Abdomen is soft  Tenderness: There is no abdominal tenderness  There is no rebound  Musculoskeletal:         General: Normal range of motion  Cervical back: Normal range of motion and neck supple  Right lower leg: Edema present  Left lower leg: Edema present  Skin:     Findings: No rash  Neurological:      Mental Status: He is alert and oriented to person, place, and time     Psychiatric:         Behavior: Behavior normal          Medications:    Current Outpatient Medications:   •  ferrous sulfate 325 (65 Fe) mg tablet, Take 1 tablet (325 mg total) by mouth daily with breakfast, Disp: 90 tablet, Rfl: 3  •  lisinopril (ZESTRIL) 5 mg tablet, TAKE 1 TABLET DAILY, Disp: 90 tablet, Rfl: 3  •  metoprolol succinate (TOPROL-XL) 50 mg 24 hr tablet, TAKE 1 TABLET DAILY, Disp: 90 tablet, Rfl: 3  •  EPINEPHrine (EpiPen 2-Mani) 0 3 mg/0 3 mL SOAJ, Inject 0 3 mL (0 3 mg total) into a muscle once for 1 dose Then go to to the ER, Disp: 0 6 mL, Rfl: 0    Laboratory Results:        Invalid input(s): ALBUMIN    Results for orders placed or performed in visit on 12/17/22   Comprehensive metabolic panel   Result Value Ref Range    Sodium 141 135 - 147 mmol/L    Potassium 4 8 3 5 - 5 3 mmol/L    Chloride 110 (H) 96 - 108 mmol/L    CO2 27 21 - 32 mmol/L    ANION GAP 4 4 - 13 mmol/L    BUN 22 5 - 25 mg/dL    Creatinine 1 05 0 60 - 1 30 mg/dL    Glucose, Fasting 113 (H) 65 - 99 mg/dL    Calcium 9 1 8 3 - 10 1 mg/dL    Corrected Calcium 9 6 8 3 - 10 1 mg/dL    AST 22 5 - 45 U/L    ALT 31 12 - 78 U/L    Alkaline Phosphatase 40 (L) 46 - 116 U/L    Total Protein 6 9 6 4 - 8 4 g/dL    Albumin 3 4 (L) 3 5 - 5 0 g/dL    Total Bilirubin 0 66 0 20 - 1 00 mg/dL    eGFR 72 ml/min/1 73sq m   CBC   Result Value Ref Range    WBC 6 22 4 31 - 10 16 Thousand/uL    RBC 5 49 3 88 - 5 62 Million/uL    Hemoglobin 15 3 12 0 - 17 0 g/dL    Hematocrit 47 2 36 5 - 49 3 %    MCV 86 82 - 98 fL    MCH 27 9 26 8 - 34 3 pg    MCHC 32 4 31 4 - 37 4 g/dL    RDW 13 1 11 6 - 15 1 %    Platelets 277 (L) 452 - 390 Thousands/uL    MPV 9 5 8 9 - 12 7 fL   Urinalysis with microscopic   Result Value Ref Range    Color, UA Yellow     Clarity, UA Clear     Specific Gravity, UA 1 020 1 000 - 1 030    pH, UA 6 0 5 0, 5 5, 6 0, 6 5, 7 0, 7 5, 8 0, 8 5, 9 0    Leukocytes, UA Negative Negative    Nitrite, UA Negative Negative    Protein, UA Negative Negative mg/dl    Glucose, UA Negative Negative mg/dl    Ketones, UA Negative Negative mg/dl    Urobilinogen, UA 0 2 0 2, 1 0 E U /dl E U /dl    Bilirubin, UA Negative Negative    Occult Blood, UA Small (A) Negative    RBC, UA 0-1 (A) None Seen, 2-4 /hpf    WBC, UA 0-1 (A) None Seen, 2-4, 5-60 /hpf    Epithelial Cells Occasional None Seen, Occasional /hpf    Bacteria, UA Occasional None Seen, Occasional /hpf

## 2023-01-05 NOTE — PATIENT INSTRUCTIONS
1  )  Low 2 g sodium diet    2 )  Monitor weights at home    3 )  Avoid NSAIDs (ibuprofen, Motrin, Advil, Aleve, naproxen)    4 )  Monitor blood pressure at home, call if blood pressure greater than 150/90 persistently    5 ) I will plan to discuss all results including blood work, and/or imaging at our next visit, unless there is an urgent indication, in which case I will call you earlier  If you have any questions or concerns about your results, please feel free to call our office      6 ) Plan for MRI this year, call me if you need something for anxiety when you have it planned

## 2023-02-07 ENCOUNTER — TELEPHONE (OUTPATIENT)
Dept: NEPHROLOGY | Facility: CLINIC | Age: 68
End: 2023-02-07

## 2023-02-07 DIAGNOSIS — F41.9 ANXIETY: Primary | ICD-10-CM

## 2023-02-07 RX ORDER — LORAZEPAM 1 MG/1
1 TABLET ORAL
Qty: 2 TABLET | Refills: 0 | Status: SHIPPED | OUTPATIENT
Start: 2023-02-07

## 2023-02-07 NOTE — TELEPHONE ENCOUNTER
Pt's wife states pt scheduled for MRI on 2/9 and would need an anxiety med called into [address]  Please call pt when done so they know to go pick it up

## 2023-02-09 ENCOUNTER — HOSPITAL ENCOUNTER (OUTPATIENT)
Dept: RADIOLOGY | Facility: HOSPITAL | Age: 68
Discharge: HOME | End: 2023-02-09
Attending: INTERNAL MEDICINE

## 2023-02-09 DIAGNOSIS — I10 ESSENTIAL HYPERTENSION: ICD-10-CM

## 2023-02-09 DIAGNOSIS — Q61.3 POLYCYSTIC KIDNEY DISEASE: ICD-10-CM

## 2023-02-09 DIAGNOSIS — N18.2 BENIGN HYPERTENSION WITH CKD (CHRONIC KIDNEY DISEASE), STAGE II: ICD-10-CM

## 2023-02-09 DIAGNOSIS — I12.9 BENIGN HYPERTENSION WITH CKD (CHRONIC KIDNEY DISEASE), STAGE II: ICD-10-CM

## 2023-02-09 RX ADMIN — GADOBUTROL 14 ML: 604.72 INJECTION INTRAVENOUS at 11:46

## 2023-02-16 ENCOUNTER — TELEPHONE (OUTPATIENT)
Dept: NEPHROLOGY | Facility: CLINIC | Age: 68
End: 2023-02-16

## 2023-02-16 NOTE — TELEPHONE ENCOUNTER
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

## 2023-02-20 ENCOUNTER — LAB (OUTPATIENT)
Dept: LAB | Facility: HOSPITAL | Age: 68
End: 2023-02-20

## 2023-02-20 DIAGNOSIS — R97.20 ELEVATED PSA: ICD-10-CM

## 2023-02-20 DIAGNOSIS — E78.5 HYPERLIPIDEMIA, MILD: ICD-10-CM

## 2023-02-20 LAB
CHOLEST SERPL-MCNC: 160 MG/DL
HDLC SERPL-MCNC: 45 MG/DL
LDLC SERPL CALC-MCNC: 94 MG/DL (ref 0–100)
PSA SERPL-MCNC: 5.7 NG/ML (ref 0–4)
TRIGL SERPL-MCNC: 103 MG/DL

## 2023-02-21 NOTE — RESULT ENCOUNTER NOTE
Wendy Kendall,    Dr Danielson Sites is currently out of the office  Your LDL (bad) cholesterol is 94 and above goal of less than 70 for patients who have had a history of stroke  A statin (cholesterol-lowering medicine) is recommended to help prevent additional stroke or heart attack  You may wish to discuss this further with your Cardiologist Dr Payan Covert  Please exercise as tolerated to increase your HDL (good) cholesterol  Your other cholesterol values were stable        Take care,    Dr Eloisa Sommers    Message sent to patient via [employer] patient Sarata

## 2023-03-01 ENCOUNTER — OFFICE VISIT (OUTPATIENT)
Dept: UROLOGY | Facility: CLINIC | Age: 68
End: 2023-03-01

## 2023-03-01 VITALS
OXYGEN SATURATION: 97 % | WEIGHT: 291.2 LBS | HEIGHT: 69 IN | DIASTOLIC BLOOD PRESSURE: 74 MMHG | HEART RATE: 55 BPM | SYSTOLIC BLOOD PRESSURE: 132 MMHG | BODY MASS INDEX: 43.13 KG/M2

## 2023-03-01 DIAGNOSIS — R97.20 ELEVATED PSA: ICD-10-CM

## 2023-03-01 DIAGNOSIS — N20.0 KIDNEY STONES: Primary | ICD-10-CM
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
## 2023-04-06 ENCOUNTER — TRANSCRIBE ORDERS (OUTPATIENT)
Dept: VASCULAR SURGERY | Facility: CLINIC | Age: 68
End: 2023-04-06

## 2023-04-06 DIAGNOSIS — Z86.73 HX OF ISCHEMIC VERTEBROBASILAR ARTERY THALAMIC STROKE: Primary | ICD-10-CM

## 2023-04-06 DIAGNOSIS — I72.4 FEMORAL ARTERY ANEURYSM (HCC): ICD-10-CM

## 2023-05-31 ENCOUNTER — HOSPITAL ENCOUNTER (OUTPATIENT)
Dept: RADIOLOGY | Facility: HOSPITAL | Age: 68
Discharge: HOME | End: 2023-05-31
Attending: SURGERY

## 2023-05-31 DIAGNOSIS — Z86.73 HX OF ISCHEMIC VERTEBROBASILAR ARTERY THALAMIC STROKE: ICD-10-CM

## 2023-07-05 ENCOUNTER — TRANSCRIBE ORDERS (OUTPATIENT)
Dept: CARDIAC SURGERY | Facility: CLINIC | Age: 68
End: 2023-07-05

## 2023-07-05 DIAGNOSIS — I71.20 THORACIC AORTIC ANEURYSM WITHOUT RUPTURE, UNSPECIFIED PART (HCC): Primary | ICD-10-CM

## 2023-07-13 ENCOUNTER — OFFICE VISIT (OUTPATIENT)
Dept: VASCULAR SURGERY | Facility: CLINIC | Age: 68
End: 2023-07-13
Payer: MEDICARE

## 2023-07-13 VITALS
SYSTOLIC BLOOD PRESSURE: 118 MMHG | WEIGHT: 289.8 LBS | HEART RATE: 46 BPM | DIASTOLIC BLOOD PRESSURE: 70 MMHG | BODY MASS INDEX: 42.92 KG/M2 | HEIGHT: 69 IN | OXYGEN SATURATION: 94 %

## 2023-07-13 DIAGNOSIS — R00.1 BRADYCARDIA WITH 41-50 BEATS PER MINUTE: ICD-10-CM

## 2023-07-13 DIAGNOSIS — I72.4 POPLITEAL ARTERY ANEURYSM, BILATERAL (HCC): ICD-10-CM

## 2023-07-13 DIAGNOSIS — I10 ESSENTIAL HYPERTENSION: ICD-10-CM

## 2023-07-13 DIAGNOSIS — I72.4 FEMORAL ARTERY ANEURYSM, BILATERAL (HCC): Primary | ICD-10-CM

## 2023-07-13 DIAGNOSIS — I71.21 ANEURYSM OF ASCENDING AORTA WITHOUT RUPTURE (HCC): ICD-10-CM

## 2023-07-13 DIAGNOSIS — I77.89 ARTERIAL ECTASIA (HCC): ICD-10-CM

## 2023-07-13 PROCEDURE — 99204 OFFICE O/P NEW MOD 45 MIN: CPT | Performed by: SURGERY

## 2023-07-13 NOTE — ASSESSMENT & PLAN NOTE
0.8 and 1 cm popliteal ectasia. Continue every other year surveillance. Arterial duplex reviewed. Remains unchanged. BP control with troprol and zestril.

## 2023-07-13 NOTE — PATIENT INSTRUCTIONS
Popliteal artery ectasias, bilateral ([facility])  0.8 and 1 cm popliteal ectasia. Continue every other year surveillance. Arterial duplex reviewed. Remains unchanged. BP control with troprol and zestril. Femoral artery aneurysm, bilateral (HCC)  bilateral femoral artery ectasia. H/o splenic artery aneurysm coiling in 2015  Ascending aortic aneurysm being followed by CT surgery with CT scans. Hypertension on 2 meds. Yearly doppler reviewed. There is no change in size of ectasia. So we can increase the follow-up to every other year. Body mass index (BMI) 45.0-49.9, adult ([facility])  We discussed the importance of weight loss. Set realistic goal of losing  0.5 lb per week for about 2-3 lb per month, 25 lb or so per year. Regular exercise and diet modification is important. Bradycardia with 41-50 beats per minute  Patient on long-term Toprol 50. Today office heart rate was 46. Normally runs in the 55 range. He is asymptomatic with normal blood pressure and no neurological symptoms. No intervention recommended at this time. Follow-up with cardiologist.  May need to reduce the dose of Toprol if necessary.

## 2023-07-13 NOTE — ASSESSMENT & PLAN NOTE
We discussed the importance of weight loss. Set realistic goal of losing  0.5 lb per week for about 2-3 lb per month, 25 lb or so per year. Regular exercise and diet modification is important.

## 2023-07-13 NOTE — ASSESSMENT & PLAN NOTE
bilateral femoral artery ectasia. H/o splenic artery aneurysm coiling in 2015  Ascending aortic aneurysm being followed by CT surgery with CT scans. Hypertension on 2 meds. Yearly doppler reviewed. There is no change in size of ectasia. So we can increase the follow-up to every other year.

## 2023-07-13 NOTE — ASSESSMENT & PLAN NOTE
Patient on long-term Toprol 50. Today office heart rate was 46. Normally runs in the 55 range. He is asymptomatic with normal blood pressure and no neurological symptoms. No intervention recommended at this time. Follow-up with cardiologist.  May need to reduce the dose of Toprol if necessary.

## 2023-07-13 NOTE — PROGRESS NOTES
Assessment/Plan:    Popliteal artery ectasias, bilateral (HCC)  0.8 and 1 cm popliteal ectasia. Continue every other year surveillance. Arterial duplex reviewed. Remains unchanged. BP control with troprol and zestril. Femoral artery aneurysm, bilateral (HCC)  bilateral femoral artery ectasia. H/o splenic artery aneurysm coiling in 2015  Ascending aortic aneurysm being followed by CT surgery with CT scans. Hypertension on 2 meds. Yearly doppler reviewed. There is no change in size of ectasia. So we can increase the follow-up to every other year. Body mass index (BMI) 45.0-49.9, adult (HCC)  We discussed the importance of weight loss. Set realistic goal of losing  0.5 lb per week for about 2-3 lb per month, 25 lb or so per year. Regular exercise and diet modification is important. Bradycardia with 41-50 beats per minute  Patient on long-term Toprol 50. Today office heart rate was 46. Normally runs in the 55 range. He is asymptomatic with normal blood pressure and no neurological symptoms. No intervention recommended at this time. Follow-up with cardiologist.  May need to reduce the dose of Toprol if necessary. Diagnoses and all orders for this visit:    Femoral artery aneurysm, bilateral (HCC)  -     VAS lower limb arterial duplex, complete bilateral; Future    Aneurysm of ascending aorta without rupture (HCC)  -     VAS lower limb arterial duplex, complete bilateral; Future    Arterial ectasia (HCC)  -     VAS lower limb arterial duplex, complete bilateral; Future    Popliteal artery ectasias, bilateral (HCC)  -     VAS lower limb arterial duplex, complete bilateral; Future    Body mass index (BMI) 45.0-49.9, adult (HCC)    Essential hypertension    Bradycardia with 41-50 beats per minute          Subjective:      Patient ID: Parveen Marin is a 77 y.o. male. Patient presents to review the STEPHEN that was performed on 5/31/23. Angeline Cosby     HPI  At the end of the day his feet feel tired and legs get swollen. He has put on weight. Blood pressure is very well controlled. The following portions of the patient's history were reviewed and updated as appropriate: allergies, current medications, past family history, past medical history, past social history, past surgical history and problem list.    Review of Systems   All other systems reviewed and are negative. I have reviewed the review of systems as entered and made appropriate changes as necessary    Objective:      /70 (BP Location: Left arm, Patient Position: Sitting, Cuff Size: Standard)   Pulse (!) 46   Ht 5' 9" (1.753 m)   Wt 131 kg (289 lb 12.8 oz)   SpO2 94%   BMI 42.80 kg/m²          Physical Exam  Vitals and nursing note reviewed. Constitutional:       Appearance: Normal appearance. He is obese. Cardiovascular:      Rate and Rhythm: Normal rate and regular rhythm. Pulses:           Dorsalis pedis pulses are 2+ on the right side and 2+ on the left side. Pulmonary:      Effort: Pulmonary effort is normal.   Abdominal:      Palpations: Abdomen is soft. Musculoskeletal:      Right lower leg: Edema present. Left lower leg: Edema present. Skin:     General: Skin is warm and dry. Capillary Refill: Capillary refill takes less than 2 seconds. Comments: changes of venous stasis discoloration in bilateral lower extremities   Neurological:      Mental Status: He is alert.    Psychiatric:         Mood and Affect: Mood normal.         Behavior: Behavior normal.

## 2023-07-18 ENCOUNTER — TELEPHONE (OUTPATIENT)
Dept: CARDIOLOGY CLINIC | Facility: CLINIC | Age: 68
End: 2023-07-18

## 2023-07-18 DIAGNOSIS — I10 ESSENTIAL HYPERTENSION: ICD-10-CM

## 2023-07-18 RX ORDER — METOPROLOL SUCCINATE 50 MG/1
25 TABLET, EXTENDED RELEASE ORAL DAILY
Qty: 90 TABLET | Refills: 3
Start: 2023-07-18

## 2023-07-18 NOTE — TELEPHONE ENCOUNTER
----- Message from [facility] on behalf of Zaki Colon sent at 7/17/2023  7:55 PM EDT -----  Regarding: Dosage of Metoprolol Succinate  Contact: [phone]  This message is being sent by Izaiah Fermin on behalf of Zaki Colon. Thanks, Dr. Maretta Libman. I would appreciate it if you could update EPIC to show 25 mg of metoprolol daily. Thanks for all you do!

## 2023-08-16 ENCOUNTER — TELEPHONE (OUTPATIENT)
Dept: NEPHROLOGY | Facility: CLINIC | Age: 68
End: 2023-08-16

## 2023-08-21 ENCOUNTER — APPOINTMENT (OUTPATIENT)
Dept: LAB | Facility: HOSPITAL | Age: 68
End: 2023-08-21
Payer: MEDICARE

## 2023-08-21 DIAGNOSIS — R97.20 ELEVATED PSA: ICD-10-CM

## 2023-08-21 LAB — PSA SERPL-MCNC: 4.87 NG/ML (ref 0–4)

## 2023-08-21 PROCEDURE — 36415 COLL VENOUS BLD VENIPUNCTURE: CPT

## 2023-08-21 PROCEDURE — 84153 ASSAY OF PSA TOTAL: CPT

## 2023-08-29 ENCOUNTER — TELEPHONE (OUTPATIENT)
Dept: UROLOGY | Facility: CLINIC | Age: 68
End: 2023-08-29

## 2023-08-29 NOTE — TELEPHONE ENCOUNTER
Jarek Purvis, spoke with wife pt did PSA for upcoming visit but didn't get KUB done. Wife stats he had an MRI in february and that you said that was sufficient enough. Please advise.

## 2023-08-30 ENCOUNTER — HOSPITAL ENCOUNTER (OUTPATIENT)
Dept: RADIOLOGY | Facility: HOSPITAL | Age: 68
Discharge: HOME | End: 2023-08-30
Payer: MEDICARE

## 2023-08-30 DIAGNOSIS — N20.0 KIDNEY STONES: ICD-10-CM

## 2023-08-30 PROCEDURE — 74018 RADEX ABDOMEN 1 VIEW: CPT

## 2023-09-01 ENCOUNTER — OFFICE VISIT (OUTPATIENT)
Dept: UROLOGY | Facility: CLINIC | Age: 68
End: 2023-09-01
Payer: MEDICARE

## 2023-09-01 VITALS
HEIGHT: 70 IN | DIASTOLIC BLOOD PRESSURE: 82 MMHG | OXYGEN SATURATION: 97 % | WEIGHT: 290 LBS | SYSTOLIC BLOOD PRESSURE: 126 MMHG | HEART RATE: 56 BPM | RESPIRATION RATE: 16 BRPM | BODY MASS INDEX: 41.52 KG/M2

## 2023-09-01 DIAGNOSIS — R97.20 ELEVATED PSA: Primary | ICD-10-CM

## 2023-09-01 PROCEDURE — 99213 OFFICE O/P EST LOW 20 MIN: CPT | Performed by: PHYSICIAN ASSISTANT
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
## 2023-09-05 ENCOUNTER — HOSPITAL ENCOUNTER (OUTPATIENT)
Dept: RADIOLOGY | Facility: HOSPITAL | Age: 68
Discharge: HOME | End: 2023-09-05
Payer: MEDICARE

## 2023-09-05 DIAGNOSIS — I71.20 THORACIC AORTIC ANEURYSM WITHOUT RUPTURE, UNSPECIFIED PART (HCC): ICD-10-CM

## 2023-09-05 PROCEDURE — G1004 CDSM NDSC: HCPCS

## 2023-09-05 PROCEDURE — 71250 CT THORAX DX C-: CPT

## 2023-09-15 ENCOUNTER — APPOINTMENT (OUTPATIENT)
Dept: LAB | Facility: HOSPITAL | Age: 68
End: 2023-09-15
Payer: MEDICARE

## 2023-09-15 DIAGNOSIS — M10.071 IDIOPATHIC GOUT OF RIGHT FOOT, UNSPECIFIED CHRONICITY: ICD-10-CM

## 2023-09-15 LAB
ERYTHROCYTE [DISTWIDTH] IN BLOOD BY AUTOMATED COUNT: 13.6 % (ref 11.6–15.1)
HCT VFR BLD AUTO: 46 % (ref 36.5–49.3)
HGB BLD-MCNC: 15.5 G/DL (ref 12–17)
MCH RBC QN AUTO: 28.5 PG (ref 26.8–34.3)
MCHC RBC AUTO-ENTMCNC: 33.7 G/DL (ref 31.4–37.4)
MCV RBC AUTO: 85 FL (ref 82–98)
PLATELET # BLD AUTO: 183 THOUSANDS/UL (ref 149–390)
PMV BLD AUTO: 9.3 FL (ref 8.9–12.7)
RBC # BLD AUTO: 5.43 MILLION/UL (ref 3.88–5.62)
URATE SERPL-MCNC: 6.6 MG/DL (ref 3.5–8.5)
WBC # BLD AUTO: 7.68 THOUSAND/UL (ref 4.31–10.16)

## 2023-09-15 PROCEDURE — 84550 ASSAY OF BLOOD/URIC ACID: CPT

## 2023-09-15 PROCEDURE — 85027 COMPLETE CBC AUTOMATED: CPT

## 2023-09-15 PROCEDURE — 36415 COLL VENOUS BLD VENIPUNCTURE: CPT

## 2023-09-18 ENCOUNTER — OFFICE VISIT (OUTPATIENT)
Dept: CARDIOLOGY CLINIC | Facility: CLINIC | Age: 68
End: 2023-09-18
Payer: MEDICARE

## 2023-09-18 VITALS
HEIGHT: 70 IN | WEIGHT: 291 LBS | SYSTOLIC BLOOD PRESSURE: 142 MMHG | BODY MASS INDEX: 41.66 KG/M2 | OXYGEN SATURATION: 94 % | DIASTOLIC BLOOD PRESSURE: 80 MMHG | HEART RATE: 64 BPM

## 2023-09-18 DIAGNOSIS — I71.20 THORACIC AORTIC ANEURYSM (TAA), UNSPECIFIED PART, UNSPECIFIED WHETHER RUPTURED (HCC): ICD-10-CM

## 2023-09-18 DIAGNOSIS — N18.2 BENIGN HYPERTENSION WITH CKD (CHRONIC KIDNEY DISEASE), STAGE II: Primary | ICD-10-CM

## 2023-09-18 DIAGNOSIS — I12.9 BENIGN HYPERTENSION WITH CKD (CHRONIC KIDNEY DISEASE), STAGE II: Primary | ICD-10-CM

## 2023-09-18 DIAGNOSIS — E78.5 HYPERLIPIDEMIA, MILD: ICD-10-CM

## 2023-09-18 PROCEDURE — 93000 ELECTROCARDIOGRAM COMPLETE: CPT | Performed by: INTERNAL MEDICINE

## 2023-09-18 PROCEDURE — 99214 OFFICE O/P EST MOD 30 MIN: CPT | Performed by: INTERNAL MEDICINE

## 2023-09-18 RX ORDER — METOPROLOL SUCCINATE 25 MG/1
25 TABLET, EXTENDED RELEASE ORAL DAILY
Qty: 90 TABLET | Refills: 3 | Status: SHIPPED | OUTPATIENT
Start: 2023-09-18
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
## 2023-09-25 DIAGNOSIS — I10 ESSENTIAL HYPERTENSION: ICD-10-CM

## 2023-09-25 RX ORDER — LISINOPRIL 5 MG/1
TABLET ORAL
Qty: 90 TABLET | Refills: 3 | Status: SHIPPED | OUTPATIENT
Start: 2023-09-25

## 2023-10-11 ENCOUNTER — OFFICE VISIT (OUTPATIENT)
Dept: CARDIAC SURGERY | Facility: CLINIC | Age: 68
End: 2023-10-11
Payer: MEDICARE

## 2023-10-11 VITALS
SYSTOLIC BLOOD PRESSURE: 131 MMHG | HEIGHT: 70 IN | WEIGHT: 292.9 LBS | BODY MASS INDEX: 41.93 KG/M2 | HEART RATE: 58 BPM | OXYGEN SATURATION: 96 % | DIASTOLIC BLOOD PRESSURE: 73 MMHG

## 2023-10-11 DIAGNOSIS — I71.21 ANEURYSM OF ASCENDING AORTA WITHOUT RUPTURE (HCC): Primary | ICD-10-CM

## 2023-10-11 PROCEDURE — 99214 OFFICE O/P EST MOD 30 MIN: CPT | Performed by: THORACIC SURGERY (CARDIOTHORACIC VASCULAR SURGERY)
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
## 2023-12-13 ENCOUNTER — OFFICE VISIT (OUTPATIENT)
Dept: CARDIOLOGY CLINIC | Facility: CLINIC | Age: 68
End: 2023-12-13
Payer: MEDICARE

## 2023-12-13 VITALS
BODY MASS INDEX: 42.09 KG/M2 | DIASTOLIC BLOOD PRESSURE: 80 MMHG | WEIGHT: 294 LBS | OXYGEN SATURATION: 94 % | HEART RATE: 59 BPM | SYSTOLIC BLOOD PRESSURE: 130 MMHG | HEIGHT: 70 IN

## 2023-12-13 DIAGNOSIS — I71.21 ANEURYSM OF ASCENDING AORTA WITHOUT RUPTURE (HCC): ICD-10-CM

## 2023-12-13 DIAGNOSIS — E78.5 HYPERLIPIDEMIA, MILD: ICD-10-CM

## 2023-12-13 DIAGNOSIS — N18.2 BENIGN HYPERTENSION WITH CKD (CHRONIC KIDNEY DISEASE), STAGE II: Primary | ICD-10-CM

## 2023-12-13 DIAGNOSIS — I67.2 CEREBRAL ATHEROSCLEROSIS: ICD-10-CM

## 2023-12-13 DIAGNOSIS — I12.9 BENIGN HYPERTENSION WITH CKD (CHRONIC KIDNEY DISEASE), STAGE II: Primary | ICD-10-CM

## 2023-12-13 PROCEDURE — 99214 OFFICE O/P EST MOD 30 MIN: CPT | Performed by: INTERNAL MEDICINE
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
## 2023-12-14 ENCOUNTER — APPOINTMENT (OUTPATIENT)
Dept: LAB | Facility: HOSPITAL | Age: 68
End: 2023-12-14
Attending: INTERNAL MEDICINE
Payer: MEDICARE

## 2023-12-14 LAB
ALBUMIN SERPL BCP-MCNC: 4.1 G/DL (ref 3.5–5)
ALP SERPL-CCNC: 34 U/L (ref 34–104)
ALT SERPL W P-5'-P-CCNC: 19 U/L (ref 7–52)
ANION GAP SERPL CALCULATED.3IONS-SCNC: 6 MMOL/L
AST SERPL W P-5'-P-CCNC: 19 U/L (ref 13–39)
BILIRUB SERPL-MCNC: 0.78 MG/DL (ref 0.2–1)
BUN SERPL-MCNC: 19 MG/DL (ref 5–25)
CALCIUM SERPL-MCNC: 9.5 MG/DL (ref 8.4–10.2)
CHLORIDE SERPL-SCNC: 107 MMOL/L (ref 96–108)
CHOLEST SERPL-MCNC: 172 MG/DL
CO2 SERPL-SCNC: 25 MMOL/L (ref 21–32)
CREAT SERPL-MCNC: 0.89 MG/DL (ref 0.6–1.3)
ERYTHROCYTE [DISTWIDTH] IN BLOOD BY AUTOMATED COUNT: 13.3 % (ref 11.6–15.1)
GFR SERPL CREATININE-BSD FRML MDRD: 86 ML/MIN/1.73SQ M
GLUCOSE P FAST SERPL-MCNC: 104 MG/DL (ref 65–99)
HCT VFR BLD AUTO: 46.3 % (ref 36.5–49.3)
HDLC SERPL-MCNC: 45 MG/DL
HGB BLD-MCNC: 15.3 G/DL (ref 12–17)
LDLC SERPL CALC-MCNC: 107 MG/DL (ref 0–100)
MCH RBC QN AUTO: 27.9 PG (ref 26.8–34.3)
MCHC RBC AUTO-ENTMCNC: 33 G/DL (ref 31.4–37.4)
MCV RBC AUTO: 85 FL (ref 82–98)
NONHDLC SERPL-MCNC: 127 MG/DL
PLATELET # BLD AUTO: 184 THOUSANDS/UL (ref 149–390)
PMV BLD AUTO: 9.3 FL (ref 8.9–12.7)
POTASSIUM SERPL-SCNC: 4.3 MMOL/L (ref 3.5–5.3)
PROT SERPL-MCNC: 6.3 G/DL (ref 6.4–8.4)
RBC # BLD AUTO: 5.48 MILLION/UL (ref 3.88–5.62)
SODIUM SERPL-SCNC: 138 MMOL/L (ref 135–147)
TRIGL SERPL-MCNC: 101 MG/DL
WBC # BLD AUTO: 5.79 THOUSAND/UL (ref 4.31–10.16)

## 2023-12-14 PROCEDURE — 80061 LIPID PANEL: CPT | Performed by: INTERNAL MEDICINE

## 2023-12-14 PROCEDURE — 36415 COLL VENOUS BLD VENIPUNCTURE: CPT | Performed by: INTERNAL MEDICINE

## 2023-12-14 PROCEDURE — 85027 COMPLETE CBC AUTOMATED: CPT | Performed by: INTERNAL MEDICINE

## 2023-12-14 PROCEDURE — 80053 COMPREHEN METABOLIC PANEL: CPT | Performed by: INTERNAL MEDICINE

## 2023-12-20 ENCOUNTER — TELEPHONE (OUTPATIENT)
Dept: CARDIOLOGY CLINIC | Facility: CLINIC | Age: 68
End: 2023-12-20

## 2023-12-20 NOTE — TELEPHONE ENCOUNTER
----- Message from Betty Salgado DO sent at 12/20/2023 10:18 AM EST -----  Can you please let the patient know blood work overall was normal  -cholesterol levels did increase a little.  Please work on diet.

## 2024-01-19 ENCOUNTER — APPOINTMENT (OUTPATIENT)
Dept: LAB | Facility: HOSPITAL | Age: 69
End: 2024-01-19
Payer: MEDICARE

## 2024-01-19 ENCOUNTER — TELEPHONE (OUTPATIENT)
Dept: NEPHROLOGY | Facility: CLINIC | Age: 69
End: 2024-01-19

## 2024-01-19 DIAGNOSIS — I10 ESSENTIAL HYPERTENSION: Primary | ICD-10-CM

## 2024-01-19 DIAGNOSIS — Q61.3 POLYCYSTIC KIDNEY DISEASE: ICD-10-CM

## 2024-01-19 DIAGNOSIS — R97.20 ELEVATED PSA: ICD-10-CM

## 2024-01-19 DIAGNOSIS — I10 ESSENTIAL HYPERTENSION: ICD-10-CM

## 2024-01-19 LAB
ALBUMIN SERPL BCP-MCNC: 4 G/DL (ref 3.5–5)
ALP SERPL-CCNC: 33 U/L (ref 34–104)
ALT SERPL W P-5'-P-CCNC: 18 U/L (ref 7–52)
ANION GAP SERPL CALCULATED.3IONS-SCNC: 6 MMOL/L
AST SERPL W P-5'-P-CCNC: 20 U/L (ref 13–39)
BACTERIA UR QL AUTO: ABNORMAL /HPF
BILIRUB SERPL-MCNC: 0.7 MG/DL (ref 0.2–1)
BILIRUB UR QL STRIP: NEGATIVE
BUN SERPL-MCNC: 20 MG/DL (ref 5–25)
CALCIUM SERPL-MCNC: 9.5 MG/DL (ref 8.4–10.2)
CHLORIDE SERPL-SCNC: 107 MMOL/L (ref 96–108)
CLARITY UR: CLEAR
CO2 SERPL-SCNC: 26 MMOL/L (ref 21–32)
COLOR UR: YELLOW
CREAT SERPL-MCNC: 1.01 MG/DL (ref 0.6–1.3)
ERYTHROCYTE [DISTWIDTH] IN BLOOD BY AUTOMATED COUNT: 13.3 % (ref 11.6–15.1)
GFR SERPL CREATININE-BSD FRML MDRD: 75 ML/MIN/1.73SQ M
GLUCOSE P FAST SERPL-MCNC: 103 MG/DL (ref 65–99)
GLUCOSE UR STRIP-MCNC: NEGATIVE MG/DL
HCT VFR BLD AUTO: 46.4 % (ref 36.5–49.3)
HGB BLD-MCNC: 15.7 G/DL (ref 12–17)
HGB UR QL STRIP.AUTO: ABNORMAL
KETONES UR STRIP-MCNC: NEGATIVE MG/DL
LEUKOCYTE ESTERASE UR QL STRIP: NEGATIVE
MCH RBC QN AUTO: 28.7 PG (ref 26.8–34.3)
MCHC RBC AUTO-ENTMCNC: 33.8 G/DL (ref 31.4–37.4)
MCV RBC AUTO: 85 FL (ref 82–98)
NITRITE UR QL STRIP: NEGATIVE
NON-SQ EPI CELLS URNS QL MICRO: ABNORMAL /HPF
PH UR STRIP.AUTO: 5.5 [PH]
PLATELET # BLD AUTO: 201 THOUSANDS/UL (ref 149–390)
PMV BLD AUTO: 9.6 FL (ref 8.9–12.7)
POTASSIUM SERPL-SCNC: 4.3 MMOL/L (ref 3.5–5.3)
PROT SERPL-MCNC: 6.9 G/DL (ref 6.4–8.4)
PROT UR STRIP-MCNC: NEGATIVE MG/DL
RBC # BLD AUTO: 5.47 MILLION/UL (ref 3.88–5.62)
RBC #/AREA URNS AUTO: ABNORMAL /HPF
SODIUM SERPL-SCNC: 139 MMOL/L (ref 135–147)
SP GR UR STRIP.AUTO: >=1.03 (ref 1–1.03)
UROBILINOGEN UR QL STRIP.AUTO: 0.2 E.U./DL
WBC # BLD AUTO: 5.77 THOUSAND/UL (ref 4.31–10.16)
WBC #/AREA URNS AUTO: ABNORMAL /HPF

## 2024-01-19 PROCEDURE — 36415 COLL VENOUS BLD VENIPUNCTURE: CPT

## 2024-01-19 PROCEDURE — 81001 URINALYSIS AUTO W/SCOPE: CPT

## 2024-01-19 PROCEDURE — 85027 COMPLETE CBC AUTOMATED: CPT

## 2024-01-19 PROCEDURE — 80053 COMPREHEN METABOLIC PANEL: CPT

## 2024-01-19 NOTE — TELEPHONE ENCOUNTER
Received call from patient stating his labs are . Please reorder labs. He will be going to lab again around 9-9:30

## 2024-01-29 ENCOUNTER — OFFICE VISIT (OUTPATIENT)
Dept: NEPHROLOGY | Facility: CLINIC | Age: 69
End: 2024-01-29
Payer: MEDICARE

## 2024-01-29 VITALS
DIASTOLIC BLOOD PRESSURE: 92 MMHG | OXYGEN SATURATION: 94 % | HEART RATE: 64 BPM | WEIGHT: 298 LBS | BODY MASS INDEX: 42.66 KG/M2 | HEIGHT: 70 IN | SYSTOLIC BLOOD PRESSURE: 138 MMHG

## 2024-01-29 DIAGNOSIS — I12.9 BENIGN HYPERTENSION WITH CKD (CHRONIC KIDNEY DISEASE), STAGE II: Primary | ICD-10-CM

## 2024-01-29 DIAGNOSIS — I10 ESSENTIAL HYPERTENSION: ICD-10-CM

## 2024-01-29 DIAGNOSIS — R97.20 ELEVATED PSA: ICD-10-CM

## 2024-01-29 DIAGNOSIS — N18.2 BENIGN HYPERTENSION WITH CKD (CHRONIC KIDNEY DISEASE), STAGE II: Primary | ICD-10-CM

## 2024-01-29 DIAGNOSIS — Q61.3 POLYCYSTIC KIDNEY DISEASE: ICD-10-CM

## 2024-01-29 PROCEDURE — 99213 OFFICE O/P EST LOW 20 MIN: CPT | Performed by: INTERNAL MEDICINE

## 2024-01-29 NOTE — PATIENT INSTRUCTIONS
1.)  Low 2 g sodium diet    2.)  Monitor weights at home    3.)  Avoid NSAIDs (ibuprofen, Motrin, Advil, Aleve, naproxen)    4.)  Monitor blood pressure at home, call if blood pressure greater than 150/90 persistently    5.) I will plan to discuss all results including blood work, and/or imaging at our next visit, unless there is an urgent indication, in which case I will call you earlier. If you have any questions or concerns about your results, please feel free to call our office.    6.)  Your kidney function continues to remain stable and at target.  Continue current management.    7.)  Follow-up in 1 year.  Call me if there are any questions or concerns.

## 2024-01-29 NOTE — PROGRESS NOTES
NEPHROLOGY OFFICE VISIT   Les Goddard 68 y.o. male MRN: [id number]  1/29/2024    Reason for Visit: Chronic kidney disease stage II, polycystic kidney disease    History of Present Illness (HPI):    I had the pleasure of seeing Les today in the renal clinic for the continued management of chronic kidney disease stage II secondary to polycystic kidney disease. Since our last visit, there has been no ER visits or hospitilizations. He currently has no complaints at this time and is feeling well. Patient denies any chest pain, shortness of breath and swelling. The last blood work was done on January 19 which included a CMP and CBC, which we have reviewed together.    He has no complaints.  No flank pain no gross hematuria.  He is asymptomatic.    Renal function remained stable and at baseline.    ASSESSMENT and PLAN:    1.) Polycystic kidney disease  - Strong family history with evidence of cysts on both kidneys that are multiple and cyst on the liver  - CTA of the head and neck was negative for aneurysm. No repeat is needed  - Creatinine baseline is around 1-1.2 mg/dL  - Urine analysis is bland with no blood and no protein  -evidence of proteinuria  -Renal function remained stable, creatinine at 1.01 mg/dL with a GFR greater than 70 mL/min  -Currently asymptomatic no gross hematuria no flank pain  --MRI from 2023 reviewed.  Simple renal and hepatic cysts.  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  No suspicious renal lesions.  -Continue current management  -I will plan to order an MRI next visit to calculate total kidney volume     2.) Hypertension  - Has a history of a 5 cm thoracic abdominal aortic aneurysm and ideally would aim to keep his systolic blood pressure less than 120 as tolerated  -target blood pressure less than 120/80  -continue metoprolol XL 75 mg daily, and lisinopril  -Blood pressure is usually under excellent control.  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  -Continue blood pressure control     4.) Iron deficiency--resolved  -Hemoglobin is normal     5.) Chronic kidney disease stage II  --baseline creatinine low 1s, 0.9-1.2 mg/dL  --setting of polycystic kidney disease and hypertension  -- Urine analysis is bland.  No microscopic hematuria or proteinuria  --Continue lisinopril for blood pressure control  --Weight loss exercise diet strongly recommended     6.) Elevated PSA  --asymptomatic  --follow-up with Urology  -- MRI of the prostate: PI-RADSv2.1 category 2-low significance for cancer.  Calculated prostate volume 73 cc      PATIENT INSTRUCTIONS:    Patient Instructions   1.)  Low 2 g sodium diet    2.)  Monitor weights at home    3.)  Avoid NSAIDs (ibuprofen, Motrin, Advil, Aleve, naproxen)    4.)  Monitor blood pressure at home, call if blood pressure greater than 150/90 persistently    5.) I will plan to discuss all results including blood work, and/or imaging at our next visit, unless there is an urgent indication, in which case I will call you earlier. If you have any questions or concerns about your results, please feel free to call our office.    6.)  Your kidney function continues to remain stable and at target.  Continue current management.    7.)  Follow-up in 1 year.  Call me if there are any questions or concerns.        Orders Placed This Encounter   Procedures    Albumin / creatinine urine ratio     Standing Status:   Future     Standing Expiration Date:   1/29/2025    Comprehensive metabolic panel     This is a patient instruction: Patient fasting for 8 hours or longer recommended.     Standing Status:   Future     Standing Expiration Date:   1/29/2025    Albumin / creatinine urine ratio     Standing Status:   Future     Standing Expiration  "Date:   1/29/2025    CBC     Standing Status:   Future     Standing Expiration Date:   1/29/2025       OBJECTIVE:  Current Weight: Weight - Scale: 135 kg (298 lb)  Vitals:    01/29/24 1003   BP: 138/92   BP Location: Left arm   Patient Position: Sitting   Cuff Size: Large   Pulse: 64   SpO2: 94%   Weight: 135 kg (298 lb)   Height: 5' 10\" (1.778 m)    Body mass index is 42.76 kg/m².      REVIEW OF SYSTEMS:    Review of Systems   Constitutional:  Negative for activity change and fever.   Respiratory:  Negative for cough, chest tightness, shortness of breath and wheezing.    Cardiovascular:  Negative for chest pain and leg swelling.   Gastrointestinal:  Negative for abdominal pain, diarrhea, nausea and vomiting.   Endocrine: Negative for polyuria.   Genitourinary:  Negative for difficulty urinating, dysuria, flank pain, frequency and urgency.   Skin:  Negative for rash.   Neurological:  Negative for dizziness, syncope, light-headedness and headaches.       PHYSICAL EXAM:      Physical Exam  Vitals and nursing note reviewed.   Constitutional:       General: He is not in acute distress.     Appearance: He is well-developed. He is obese.   HENT:      Head: Normocephalic and atraumatic.   Eyes:      General: No scleral icterus.     Conjunctiva/sclera: Conjunctivae normal.      Pupils: Pupils are equal, round, and reactive to light.   Cardiovascular:      Rate and Rhythm: Normal rate and regular rhythm.      Heart sounds: S1 normal and S2 normal. No murmur heard.     No friction rub. No gallop.   Pulmonary:      Effort: Pulmonary effort is normal. No respiratory distress.      Breath sounds: Normal breath sounds. No wheezing or rales.   Abdominal:      General: Bowel sounds are normal.      Palpations: Abdomen is soft.      Tenderness: There is no abdominal tenderness. There is no rebound.   Musculoskeletal:         General: Normal range of motion.      Cervical back: Normal range of motion and neck supple.   Skin:     " "Findings: No rash.   Neurological:      Mental Status: He is alert and oriented to person, place, and time.   Psychiatric:         Behavior: Behavior normal.         Medications:    Current Outpatient Medications:     ferrous sulfate 325 (65 Fe) mg tablet, Take 1 tablet (325 mg total) by mouth daily with breakfast, Disp: 90 tablet, Rfl: 3    lisinopril (ZESTRIL) 5 mg tablet, TAKE 1 TABLET DAILY, Disp: 90 tablet, Rfl: 3    metoprolol succinate (TOPROL-XL) 25 mg 24 hr tablet, Take 1 tablet (25 mg total) by mouth daily, Disp: 90 tablet, Rfl: 3    TART CHERRY PO, Take 3,600 mg by mouth in the morning, Disp: , Rfl:     EPINEPHrine (EpiPen 2-Mani) 0.3 mg/0.3 mL SOAJ, Inject 0.3 mL (0.3 mg total) into a muscle once for 1 dose Then go to to the ER, Disp: 0.6 mL, Rfl: 0    LORazepam (ATIVAN) 1 mg tablet, Take 1 tablet (1 mg total) by mouth once in imaging for anxiety (Take 1 mg prior to MRI) for up to 1 dose Take 1 mg 1 hour prior to MRI. Can take 2nd tablet if still anxious during MRI (Patient not taking: Reported on 3/1/2023), Disp: 2 tablet, Rfl: 0    Laboratory Results:        Invalid input(s): \"ALBUMIN\"    Results for orders placed or performed in visit on 01/19/24   Comprehensive metabolic panel   Result Value Ref Range    Sodium 139 135 - 147 mmol/L    Potassium 4.3 3.5 - 5.3 mmol/L    Chloride 107 96 - 108 mmol/L    CO2 26 21 - 32 mmol/L    ANION GAP 6 mmol/L    BUN 20 5 - 25 mg/dL    Creatinine 1.01 0.60 - 1.30 mg/dL    Glucose, Fasting 103 (H) 65 - 99 mg/dL    Calcium 9.5 8.4 - 10.2 mg/dL    AST 20 13 - 39 U/L    ALT 18 7 - 52 U/L    Alkaline Phosphatase 33 (L) 34 - 104 U/L    Total Protein 6.9 6.4 - 8.4 g/dL    Albumin 4.0 3.5 - 5.0 g/dL    Total Bilirubin 0.70 0.20 - 1.00 mg/dL    eGFR 75 ml/min/1.73sq m   CBC and Platelet   Result Value Ref Range    WBC 5.77 4.31 - 10.16 Thousand/uL    RBC 5.47 3.88 - 5.62 Million/uL    Hemoglobin 15.7 12.0 - 17.0 g/dL    Hematocrit 46.4 36.5 - 49.3 %    MCV 85 82 - 98 fL    MCH " 28.7 26.8 - 34.3 pg    MCHC 33.8 31.4 - 37.4 g/dL    RDW 13.3 11.6 - 15.1 %    Platelets 201 149 - 390 Thousands/uL    MPV 9.6 8.9 - 12.7 fL   Urinalysis with microscopic   Result Value Ref Range    Color, UA Yellow     Clarity, UA Clear     Specific Gravity, UA >=1.030 1.000 - 1.030    pH, UA 5.5 5.0, 5.5, 6.0, 6.5, 7.0, 7.5, 8.0, 8.5, 9.0    Leukocytes, UA Negative Negative    Nitrite, UA Negative Negative    Protein, UA Negative Negative mg/dl    Glucose, UA Negative Negative mg/dl    Ketones, UA Negative Negative mg/dl    Urobilinogen, UA 0.2 0.2, 1.0 E.U./dl E.U./dl    Bilirubin, UA Negative Negative    Occult Blood, UA Small (A) Negative    RBC, UA 0-1 (A) None Seen, 2-4 /hpf    WBC, UA 1-2 (A) None Seen, 2-4, 5-60 /hpf    Epithelial Cells Occasional None Seen, Occasional /hpf    Bacteria, UA None Seen None Seen, Occasional /hpf

## 2024-02-22 ENCOUNTER — APPOINTMENT (OUTPATIENT)
Dept: LAB | Facility: CLINIC | Age: 69
End: 2024-02-22
Payer: MEDICARE

## 2024-02-22 DIAGNOSIS — N18.2 BENIGN HYPERTENSION WITH CKD (CHRONIC KIDNEY DISEASE), STAGE II: ICD-10-CM

## 2024-02-22 DIAGNOSIS — Q61.3 POLYCYSTIC KIDNEY DISEASE: ICD-10-CM

## 2024-02-22 DIAGNOSIS — I12.9 BENIGN HYPERTENSION WITH CKD (CHRONIC KIDNEY DISEASE), STAGE II: ICD-10-CM

## 2024-02-22 DIAGNOSIS — I10 ESSENTIAL HYPERTENSION: ICD-10-CM

## 2024-02-22 LAB — PSA SERPL-MCNC: 6.69 NG/ML (ref 0–4)

## 2024-02-22 PROCEDURE — 84153 ASSAY OF PSA TOTAL: CPT

## 2024-02-26 ENCOUNTER — OFFICE VISIT (OUTPATIENT)
Dept: FAMILY MEDICINE CLINIC | Facility: CLINIC | Age: 69
End: 2024-02-26
Payer: MEDICARE

## 2024-02-26 VITALS
TEMPERATURE: 97.6 F | HEART RATE: 58 BPM | OXYGEN SATURATION: 97 % | RESPIRATION RATE: 20 BRPM | HEIGHT: 70 IN | WEIGHT: 295 LBS | SYSTOLIC BLOOD PRESSURE: 132 MMHG | DIASTOLIC BLOOD PRESSURE: 66 MMHG | BODY MASS INDEX: 42.23 KG/M2

## 2024-02-26 DIAGNOSIS — E55.9 VITAMIN D DEFICIENCY: ICD-10-CM

## 2024-02-26 DIAGNOSIS — N18.2 BENIGN HYPERTENSION WITH CKD (CHRONIC KIDNEY DISEASE), STAGE II: ICD-10-CM

## 2024-02-26 DIAGNOSIS — Z12.11 COLON CANCER SCREENING: ICD-10-CM

## 2024-02-26 DIAGNOSIS — I71.21 ANEURYSM OF ASCENDING AORTA WITHOUT RUPTURE (HCC): ICD-10-CM

## 2024-02-26 DIAGNOSIS — I12.9 BENIGN HYPERTENSION WITH CKD (CHRONIC KIDNEY DISEASE), STAGE II: ICD-10-CM

## 2024-02-26 DIAGNOSIS — Z00.00 MEDICARE ANNUAL WELLNESS VISIT, SUBSEQUENT: Primary | ICD-10-CM

## 2024-02-26 PROCEDURE — 99214 OFFICE O/P EST MOD 30 MIN: CPT | Performed by: FAMILY MEDICINE

## 2024-02-26 PROCEDURE — G0439 PPPS, SUBSEQ VISIT: HCPCS | Performed by: FAMILY MEDICINE
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
## 2024-02-26 NOTE — PATIENT INSTRUCTIONS
Medicare Preventive Visit Patient Instructions  Thank you for completing your Welcome to Medicare Visit or Medicare Annual Wellness Visit today. Your next wellness visit will be due in one year (2/26/2025).  The screening/preventive services that you may require over the next 5-10 years are detailed below. Some tests may not apply to you based off risk factors and/or age. Screening tests ordered at today's visit but not completed yet may show as past due. Also, please note that scanned in results may not display below.  Preventive Screenings:  Service Recommendations Previous Testing/Comments   Colorectal Cancer Screening  Colonoscopy    Fecal Occult Blood Test (FOBT)/Fecal Immunochemical Test (FIT)  Fecal DNA/Cologuard Test  Flexible Sigmoidoscopy Age: 45-75 years old   Colonoscopy: every 10 years (May be performed more frequently if at higher risk)  OR  FOBT/FIT: every 1 year  OR  Cologuard: every 3 years  OR  Sigmoidoscopy: every 5 years  Screening may be recommended earlier than age 45 if at higher risk for colorectal cancer. Also, an individualized decision between you and your healthcare provider will decide whether screening between the ages of 76-85 would be appropriate. Colonoscopy: Not on file  FOBT/FIT: Not on file  Cologuard: Not on file  Sigmoidoscopy: Not on file          Prostate Cancer Screening Individualized decision between patient and health care provider in men between ages of 55-69   Medicare will cover every 12 months beginning on the day after your 50th birthday PSA: 6.69 ng/mL     Screening Current     Hepatitis C Screening Once for adults born between 1945 and 1965  More frequently in patients at high risk for Hepatitis C Hep C Antibody: 10/03/2019    Screening Current   Diabetes Screening 1-2 times per year if you're at risk for diabetes or have pre-diabetes Fasting glucose: 103 mg/dL (1/19/2024)  A1C: No results in last 5 years (No results in last 5 years)  Screening Current   Cholesterol  Screening Once every 5 years if you don't have a lipid disorder. May order more often based on risk factors. Lipid panel: 12/14/2023  Screening Not Indicated  History Lipid Disorder      Other Preventive Screenings Covered by Medicare:  Abdominal Aortic Aneurysm (AAA) Screening: covered once if your at risk. You're considered to be at risk if you have a family history of AAA or a male between the age of 65-75 who smoking at least 100 cigarettes in your lifetime.  Lung Cancer Screening: covers low dose CT scan once per year if you meet all of the following conditions: (1) Age 55-77; (2) No signs or symptoms of lung cancer; (3) Current smoker or have quit smoking within the last 15 years; (4) You have a tobacco smoking history of at least 20 pack years (packs per day x number of years you smoked); (5) You get a written order from a healthcare provider.  Glaucoma Screening: covered annually if you're considered high risk: (1) You have diabetes OR (2) Family history of glaucoma OR (3)  aged 50 and older OR (4)  American aged 65 and older  Osteoporosis Screening: covered every 2 years if you meet one of the following conditions: (1) Have a vertebral abnormality; (2) On glucocorticoid therapy for more than 3 months; (3) Have primary hyperparathyroidism; (4) On osteoporosis medications and need to assess response to drug therapy.  HIV Screening: covered annually if you're between the age of 15-65. Also covered annually if you are younger than 15 and older than 65 with risk factors for HIV infection. For pregnant patients, it is covered up to 3 times per pregnancy.    Immunizations:  Immunization Recommendations   Influenza Vaccine Annual influenza vaccination during flu season is recommended for all persons aged >= 6 months who do not have contraindications   Pneumococcal Vaccine   * Pneumococcal conjugate vaccine = PCV13 (Prevnar 13), PCV15 (Vaxneuvance), PCV20 (Prevnar 20)  * Pneumococcal  polysaccharide vaccine = PPSV23 (Pneumovax) Adults 19-65 yo with certain risk factors or if 65+ yo  If never received any pneumonia vaccine: recommend Prevnar 20 (PCV20)  Give PCV20 if previously received 1 dose of PCV13 or PPSV23   Hepatitis B Vaccine 3 dose series if at intermediate or high risk (ex: diabetes, end stage renal disease, liver disease)   Respiratory syncytial virus (RSV) Vaccine - COVERED BY MEDICARE PART D  * RSVPreF3 (Arexvy) CDC recommends that adults 60 years of age and older may receive a single dose of RSV vaccine using shared clinical decision-making (SCDM)   Tetanus (Td) Vaccine - COST NOT COVERED BY MEDICARE PART B Following completion of primary series, a booster dose should be given every 10 years to maintain immunity against tetanus. Td may also be given as tetanus wound prophylaxis.   Tdap Vaccine - COST NOT COVERED BY MEDICARE PART B Recommended at least once for all adults. For pregnant patients, recommended with each pregnancy.   Shingles Vaccine (Shingrix) - COST NOT COVERED BY MEDICARE PART B  2 shot series recommended in those 19 years and older who have or will have weakened immune systems or those 50 years and older     Health Maintenance Due:      Topic Date Due   • Colorectal Cancer Screening  02/12/2023   • Hepatitis C Screening  Completed     Immunizations Due:      Topic Date Due   • COVID-19 Vaccine (6 - 2023-24 season) 09/01/2023     Advance Directives   What are advance directives?  Advance directives are legal documents that state your wishes and plans for medical care. These plans are made ahead of time in case you lose your ability to make decisions for yourself. Advance directives can apply to any medical decision, such as the treatments you want, and if you want to donate organs.   What are the types of advance directives?  There are many types of advance directives, and each state has rules about how to use them. You may choose a combination of any of the  following:  Living will:  This is a written record of the treatment you want. You can also choose which treatments you do not want, which to limit, and which to stop at a certain time. This includes surgery, medicine, IV fluid, and tube feedings.   Durable power of  for healthcare (DPAHC):  This is a written record that states who you want to make healthcare choices for you when you are unable to make them for yourself. This person, called a proxy, is usually a family member or a friend. You may choose more than 1 proxy.  Do not resuscitate (DNR) order:  A DNR order is used in case your heart stops beating or you stop breathing. It is a request not to have certain forms of treatment, such as CPR. A DNR order may be included in other types of advance directives.  Medical directive:  This covers the care that you want if you are in a coma, near death, or unable to make decisions for yourself. You can list the treatments you want for each condition. Treatment may include pain medicine, surgery, blood transfusions, dialysis, IV or tube feedings, and a ventilator (breathing machine).  Values history:  This document has questions about your views, beliefs, and how you feel and think about life. This information can help others choose the care that you would choose.  Why are advance directives important?  An advance directive helps you control your care. Although spoken wishes may be used, it is better to have your wishes written down. Spoken wishes can be misunderstood, or not followed. Treatments may be given even if you do not want them. An advance directive may make it easier for your family to make difficult choices about your care.   Weight Management   Why it is important to manage your weight:  Being overweight increases your risk of health conditions such as heart disease, high blood pressure, type 2 diabetes, and certain types of cancer. It can also increase your risk for osteoarthritis, sleep apnea, and  other respiratory problems. Aim for a slow, steady weight loss. Even a small amount of weight loss can lower your risk of health problems.  How to lose weight safely:  A safe and healthy way to lose weight is to eat fewer calories and get regular exercise. You can lose up about 1 pound a week by decreasing the number of calories you eat by 500 calories each day.   Healthy meal plan for weight management:  A healthy meal plan includes a variety of foods, contains fewer calories, and helps you stay healthy. A healthy meal plan includes the following:  Eat whole-grain foods more often.  A healthy meal plan should contain fiber. Fiber is the part of grains, fruits, and vegetables that is not broken down by your body. Whole-grain foods are healthy and provide extra fiber in your diet. Some examples of whole-grain foods are whole-wheat breads and pastas, oatmeal, brown rice, and bulgur.  Eat a variety of vegetables every day.  Include dark, leafy greens such as spinach, kale, anahi greens, and mustard greens. Eat yellow and orange vegetables such as carrots, sweet potatoes, and winter squash.   Eat a variety of fruits every day.  Choose fresh or canned fruit (canned in its own juice or light syrup) instead of juice. Fruit juice has very little or no fiber.  Eat low-fat dairy foods.  Drink fat-free (skim) milk or 1% milk. Eat fat-free yogurt and low-fat cottage cheese. Try low-fat cheeses such as mozzarella and other reduced-fat cheeses.  Choose meat and other protein foods that are low in fat.  Choose beans or other legumes such as split peas or lentils. Choose fish, skinless poultry (chicken or turkey), or lean cuts of red meat (beef or pork). Before you cook meat or poultry, cut off any visible fat.   Use less fat and oil.  Try baking foods instead of frying them. Add less fat, such as margarine, sour cream, regular salad dressing and mayonnaise to foods. Eat fewer high-fat foods. Some examples of high-fat foods  include french fries, doughnuts, ice cream, and cakes.  Eat fewer sweets.  Limit foods and drinks that are high in sugar. This includes candy, cookies, regular soda, and sweetened drinks.  Exercise:  Exercise at least 30 minutes per day on most days of the week. Some examples of exercise include walking, biking, dancing, and swimming. You can also fit in more physical activity by taking the stairs instead of the elevator or parking farther away from stores. Ask your healthcare provider about the best exercise plan for you.      © Copyright [employer] 2018 Information is for End User's use only and may not be sold, redistributed or otherwise used for commercial purposes. All illustrations and images included in CareNotes® are the copyrighted property of [employer] or [employer]

## 2024-02-28 ENCOUNTER — APPOINTMENT (OUTPATIENT)
Dept: LAB | Facility: HOSPITAL | Age: 69
End: 2024-02-28
Payer: MEDICARE

## 2024-02-28 DIAGNOSIS — E55.9 VITAMIN D DEFICIENCY: ICD-10-CM

## 2024-02-28 LAB — 25(OH)D3 SERPL-MCNC: 27.3 NG/ML (ref 30–100)

## 2024-02-28 PROCEDURE — 82306 VITAMIN D 25 HYDROXY: CPT

## 2024-02-28 PROCEDURE — 36415 COLL VENOUS BLD VENIPUNCTURE: CPT

## 2024-02-29 DIAGNOSIS — E55.9 VITAMIN D DEFICIENCY: Primary | ICD-10-CM

## 2024-02-29 RX ORDER — MELATONIN
1000 DAILY
Start: 2024-02-29

## 2024-03-03 NOTE — ASSESSMENT & PLAN NOTE
BP well controlled  Renal function stable      Lab Results   Component Value Date    EGFR 75 01/19/2024    EGFR 86 12/14/2023    EGFR 72 12/17/2022    CREATININE 1.01 01/19/2024    CREATININE 0.89 12/14/2023    CREATININE 1.05 12/17/2022

## 2024-03-04 ENCOUNTER — OFFICE VISIT (OUTPATIENT)
Dept: UROLOGY | Facility: CLINIC | Age: 69
End: 2024-03-04
Payer: MEDICARE

## 2024-03-04 VITALS
SYSTOLIC BLOOD PRESSURE: 140 MMHG | OXYGEN SATURATION: 96 % | WEIGHT: 295 LBS | HEIGHT: 70 IN | RESPIRATION RATE: 15 BRPM | DIASTOLIC BLOOD PRESSURE: 80 MMHG | HEART RATE: 64 BPM | BODY MASS INDEX: 42.23 KG/M2

## 2024-03-04 DIAGNOSIS — R97.20 ELEVATED PSA: Primary | ICD-10-CM

## 2024-03-04 PROCEDURE — 99213 OFFICE O/P EST LOW 20 MIN: CPT | Performed by: PHYSICIAN ASSISTANT

## 2024-03-04 NOTE — ASSESSMENT & PLAN NOTE
Wt Readings from Last 3 Encounters:   02/26/24 134 kg (295 lb)   01/29/24 135 kg (298 lb)   12/13/23 133 kg (294 lb)     Encouraged to work on dietary habits, particularly cutting down on fatty processed meats, watching portions, etc.

## 2024-03-04 NOTE — PROGRESS NOTES
UROLOGY PROGRESS NOTE   Patient Identifiers: Les Goddard (MRN [id number])  Date of Service: 3/4/2024    Subjective:   68-year-old man with history of elevated PSA.  He had a negative prostate biopsy in 2007.  An MRI in 2022 showed PI-RADS 2.  PSA up to 6.69.  No significant voiding complaints.  Prostate around 70 g by MRI.    Reason for visit: Elevated PSA follow-up    Objective:     VITALS:    There were no vitals filed for this visit.  AUA SYMPTOM SCORE      Flowsheet Row Most Recent Value   AUA SYMPTOM SCORE    How often have you had a sensation of not emptying your bladder completely after you finished urinating? 0 (P)    How often have you had to urinate again less than two hours after you finished urinating? 1 (P)    How often have you found you stopped and started again several times when you urinate? 0 (P)    How often have you found it difficult to postpone urination? 0 (P)    How often have you had a weak urinary stream? 0 (P)    How often have you had to push or strain to begin urination? 0 (P)    How many times did you most typically get up to urinate from the time you went to bed at night until the time you got up in the morning? 1 (P)    Quality of Life: If you were to spend the rest of your life with your urinary condition just the way it is now, how would you feel about that? 1 (P)    AUA SYMPTOM SCORE 2 (P)               LABS:  Lab Results   Component Value Date    HGB 15.7 01/19/2024    HCT 46.4 01/19/2024    WBC 5.77 01/19/2024     01/19/2024   ]    Lab Results   Component Value Date     12/01/2016    K 4.3 01/19/2024     01/19/2024    CO2 26 01/19/2024    BUN 20 01/19/2024    CREATININE 1.01 01/19/2024    CALCIUM 9.5 01/19/2024    GLUCOSE 109 (H) 12/01/2016   ]        INPATIENT MEDS:    Current Outpatient Medications:     cholecalciferol (VITAMIN D3) 1,000 units tablet, Take 1 tablet (1,000 Units total) by mouth daily Over the counter, Disp: , Rfl:     ferrous sulfate 325 (65  "Fe) mg tablet, Take 1 tablet (325 mg total) by mouth daily with breakfast, Disp: 90 tablet, Rfl: 3    lisinopril (ZESTRIL) 5 mg tablet, TAKE 1 TABLET DAILY, Disp: 90 tablet, Rfl: 3    metoprolol succinate (TOPROL-XL) 25 mg 24 hr tablet, Take 1 tablet (25 mg total) by mouth daily, Disp: 90 tablet, Rfl: 3    TART CHERRY PO, Take 3,600 mg by mouth in the morning, Disp: , Rfl:     EPINEPHrine (EpiPen 2-Mani) 0.3 mg/0.3 mL SOAJ, Inject 0.3 mL (0.3 mg total) into a muscle once for 1 dose Then go to to the ER, Disp: 0.6 mL, Rfl: 0      Physical Exam:   Ht 5' 10\" (1.778 m)   Wt 134 kg (295 lb)   BMI 42.33 kg/m²   GEN: no acute distress    RESP: breathing comfortably with no accessory muscle use    ABD: soft, non-tender, non-distended   INCISION:    EXT: no significant peripheral edema   (Male): Penis circumcised, phallus normal, meatus patent.  Testicles descended into scrotum bilaterally without masses nor tenderness.  No inguinal hernias bilaterally.  CLEMENT: Prostate is enlarged at 50+ grams slightly irregular on the right. The prostate is not boggy. The prostate is not tender.  No nodules noted      RADIOLOGY:   None    Assessment:   #1.  Elevated PSA    Plan:   -Recommend MRI now  -If PI-RADS 1 or 2 we will follow-up in 6 months with PSA prior  -Otherwise I will call him and arrange for a prostate biopsy  -Alprazolam sent to the pharmacy to take prior to the MRI.  Must have a  for the test.          "

## 2024-03-05 ENCOUNTER — PATIENT MESSAGE (OUTPATIENT)
Dept: FAMILY MEDICINE CLINIC | Facility: CLINIC | Age: 69
End: 2024-03-05

## 2024-03-05 RX ORDER — ALPRAZOLAM 1 MG/1
TABLET ORAL
Qty: 1 TABLET | Refills: 0 | Status: SHIPPED | OUTPATIENT
Start: 2024-03-05

## 2024-03-12 LAB — COLOGUARD RESULT REPORTABLE: NEGATIVE

## 2024-03-18 ENCOUNTER — APPOINTMENT (OUTPATIENT)
Dept: LAB | Facility: HOSPITAL | Age: 69
End: 2024-03-18
Payer: MEDICARE

## 2024-03-18 DIAGNOSIS — R97.20 ELEVATED PSA: ICD-10-CM

## 2024-03-18 LAB
ANION GAP SERPL CALCULATED.3IONS-SCNC: 6 MMOL/L (ref 4–13)
BUN SERPL-MCNC: 21 MG/DL (ref 5–25)
CALCIUM SERPL-MCNC: 8.9 MG/DL (ref 8.4–10.2)
CHLORIDE SERPL-SCNC: 108 MMOL/L (ref 96–108)
CO2 SERPL-SCNC: 26 MMOL/L (ref 21–32)
CREAT SERPL-MCNC: 0.94 MG/DL (ref 0.6–1.3)
GFR SERPL CREATININE-BSD FRML MDRD: 81 ML/MIN/1.73SQ M
GLUCOSE P FAST SERPL-MCNC: 107 MG/DL (ref 65–99)
POTASSIUM SERPL-SCNC: 4.1 MMOL/L (ref 3.5–5.3)
PSA SERPL-MCNC: 7.24 NG/ML (ref 0–4)
SODIUM SERPL-SCNC: 140 MMOL/L (ref 135–147)

## 2024-03-18 PROCEDURE — 36415 COLL VENOUS BLD VENIPUNCTURE: CPT

## 2024-03-18 PROCEDURE — 80048 BASIC METABOLIC PNL TOTAL CA: CPT

## 2024-03-18 PROCEDURE — 84153 ASSAY OF PSA TOTAL: CPT

## 2024-03-21 ENCOUNTER — TELEPHONE (OUTPATIENT)
Dept: RADIOLOGY | Facility: HOSPITAL | Age: 69
End: 2024-03-21

## 2024-03-25 ENCOUNTER — HOSPITAL ENCOUNTER (OUTPATIENT)
Dept: RADIOLOGY | Facility: HOSPITAL | Age: 69
Discharge: HOME | End: 2024-03-25
Payer: MEDICARE

## 2024-03-25 DIAGNOSIS — R97.20 ELEVATED PSA: ICD-10-CM

## 2024-03-25 PROCEDURE — 76377 3D RENDER W/INTRP POSTPROCES: CPT

## 2024-03-25 PROCEDURE — A9585 GADOBUTROL INJECTION: HCPCS | Performed by: PHYSICIAN ASSISTANT

## 2024-03-25 PROCEDURE — 72197 MRI PELVIS W/O & W/DYE: CPT

## 2024-03-25 RX ORDER — GADOBUTROL 604.72 MG/ML
13 INJECTION INTRAVENOUS
Status: COMPLETED | OUTPATIENT
Start: 2024-03-25 | End: 2024-03-25

## 2024-03-25 RX ADMIN — GADOBUTROL 13 ML: 604.72 INJECTION INTRAVENOUS at 10:36

## 2024-07-08 ENCOUNTER — TELEPHONE (OUTPATIENT)
Age: 69
End: 2024-07-08

## 2024-07-08 NOTE — TELEPHONE ENCOUNTER
Patient's spouse Afshan French called in to report patient has had a deep cough with wheezing.for the past two weeks. Taking  Mucinex for 6 days and not bringing any sputum up. History of Ascending aorta aneurysm and she is concerned with the amount of coughing patient is having. Reports no OV available for 2 weeks. Is it possible to schedule patient on Tuesday 7/9 in 9:40 AM Next Day slot? Please follow up with Afshan French for provider response for OV, medication order.

## 2024-07-08 NOTE — TELEPHONE ENCOUNTER
Patient needs appointment for evaluation. Please reach out to patient/patient's wife to schedule. This will need to be in person.

## 2024-07-09 ENCOUNTER — HOSPITAL ENCOUNTER (OUTPATIENT)
Dept: RADIOLOGY | Facility: HOSPITAL | Age: 69
Discharge: HOME | End: 2024-07-09
Payer: MEDICARE

## 2024-07-09 ENCOUNTER — OFFICE VISIT (OUTPATIENT)
Dept: FAMILY MEDICINE CLINIC | Facility: CLINIC | Age: 69
End: 2024-07-09
Payer: MEDICARE

## 2024-07-09 VITALS
HEART RATE: 84 BPM | HEIGHT: 70 IN | TEMPERATURE: 97.5 F | DIASTOLIC BLOOD PRESSURE: 68 MMHG | RESPIRATION RATE: 20 BRPM | OXYGEN SATURATION: 96 % | BODY MASS INDEX: 42.23 KG/M2 | SYSTOLIC BLOOD PRESSURE: 118 MMHG | WEIGHT: 295 LBS

## 2024-07-09 DIAGNOSIS — R05.1 ACUTE COUGH: ICD-10-CM

## 2024-07-09 DIAGNOSIS — S00.01XA ABRASION, SCALP W/O INFECTION: ICD-10-CM

## 2024-07-09 DIAGNOSIS — R06.81 WITNESSED EPISODE OF APNEA: ICD-10-CM

## 2024-07-09 DIAGNOSIS — R06.2 WHEEZING: Primary | ICD-10-CM

## 2024-07-09 DIAGNOSIS — R06.83 SNORING: ICD-10-CM

## 2024-07-09 DIAGNOSIS — R40.0 DAYTIME SOMNOLENCE: ICD-10-CM

## 2024-07-09 PROCEDURE — G2211 COMPLEX E/M VISIT ADD ON: HCPCS | Performed by: FAMILY MEDICINE

## 2024-07-09 PROCEDURE — 99214 OFFICE O/P EST MOD 30 MIN: CPT | Performed by: FAMILY MEDICINE

## 2024-07-09 PROCEDURE — 71046 X-RAY EXAM CHEST 2 VIEWS: CPT

## 2024-07-09 RX ORDER — PREDNISONE 50 MG/1
50 TABLET ORAL DAILY
Qty: 5 TABLET | Refills: 0 | Status: SHIPPED | OUTPATIENT
Start: 2024-07-09 | End: 2024-07-14

## 2024-07-09 RX ORDER — ALBUTEROL SULFATE 90 UG/1
2 AEROSOL, METERED RESPIRATORY (INHALATION) EVERY 4 HOURS PRN
Qty: 18 G | Refills: 0 | Status: SHIPPED | OUTPATIENT
Start: 2024-07-09

## 2024-07-09 NOTE — PROGRESS NOTES
Assessment/Plan:       Problem List Items Addressed This Visit    None  Visit Diagnoses     Wheezing    -  Primary    pred 50 mg daily x 5d, albuterol q4 hrs prn.    check cxr  will start abx if infiltrate    Relevant Medications    predniSONE 50 mg tablet    albuterol (Ventolin HFA) 90 mcg/act inhaler    Acute cough        Relevant Orders    XR chest pa & lateral (Completed)    Witnessed episode of apnea        concern for ANASTASIIA.  refer for sleep study.  (snoring, witnessed apneas, nighttime gasping, obesity, large neck circumference, daytime fatigue)    Relevant Orders    Ambulatory Referral to Sleep Medicine    Snoring        Relevant Orders    Ambulatory Referral to Sleep Medicine    Daytime somnolence        Relevant Orders    Ambulatory Referral to Sleep Medicine    Abrasion, scalp w/o infection        apply thin layer of abx ointment daily, keep clean/dry.  call if not healing well or any concern for infection                   Subjective:     Les is a 69 y.o. male here today with chief complaint below:  Chief Complaint   Patient presents with   • Cough     Pt reports cough for the last 2-3 weeks that is worsening at this time. Using Mucinex and Robitussin with no improvement. Wheezing when laying down in bed at night.    • Fall     Pt fell and hit his head on the car yesterday.      - CC above per clinical staff and reviewed.    HPI:  Pt here w wife.  Has been coughing for the past 2 weeks or so.   Started w/ sore throat initially, which has improved  Wife checked throat, no redness, no exudate.   No nasal congestion  Has had a tight cough.   Feels post nasal drip.   No fevers.    Has never had wheezing before but he can feel wheeze and wife hears it.   His snoring is louder and he sounds like he is having apneas.   Wife says the apnea is worse, but not new.   He is napping more lately also.     Wife not sick.     Two days ago, tripped over weights on the garage floor and fell forward hitting head on the  Reviewed. "passenger side door and catching self with hands on the car door/frame.  Didn't fall on the ground/ stayed standing.  No bleeding.  No LOC.  Didn't feel dizzy/lightheaded.  No vision changes.    He managed to avoid telling his wife until yesterday.   [place] ok to drive home that night.  No HA.   Was wearing a hat, brushburned against his head when he hit the car door.           The following portions of the patient's history were reviewed and updated as appropriate: allergies, current medications, past family history, past medical history, past social history, past surgical history and problem list.    ROS:  Review of Systems       Objective:      /68   Pulse 84   Temp 97.5 °F (36.4 °C)   Resp 20   Ht 5' 10\" (1.778 m)   Wt 134 kg (295 lb)   SpO2 96%   BMI 42.33 kg/m²   BP Readings from Last 3 Encounters:   07/09/24 118/68   03/04/24 140/80   02/26/24 132/66     Wt Readings from Last 3 Encounters:   07/09/24 134 kg (295 lb)   03/04/24 134 kg (295 lb)   02/26/24 134 kg (295 lb)               Physical Exam:   Physical Exam  Vitals and nursing note reviewed.   Constitutional:       General: He is not in acute distress.  HENT:      Head:        Comments: Superficial abrasion superior aspect of frontal region. No surrounding erythema. Minimal edema.  No purulent discharge     Nose: Nose normal.      Mouth/Throat:      Mouth: Mucous membranes are moist.   Cardiovascular:      Rate and Rhythm: Normal rate and regular rhythm.   Pulmonary:      Effort: Pulmonary effort is normal. No respiratory distress.      Breath sounds: Wheezing (scattered end exp wheezes b/l) present. No rhonchi.   Musculoskeletal:      Comments: 2+ bilateral LE edema about 2/3 up lower legs   Lymphadenopathy:      Cervical: No cervical adenopathy.   Neurological:      Mental Status: He is alert.                "

## 2024-07-10 DIAGNOSIS — R06.83 SNORING: ICD-10-CM

## 2024-07-10 DIAGNOSIS — R06.81 WITNESSED EPISODE OF APNEA: ICD-10-CM

## 2024-07-10 DIAGNOSIS — R40.0 DAYTIME SOMNOLENCE: ICD-10-CM

## 2024-07-10 DIAGNOSIS — G47.30 SLEEP APNEA, UNSPECIFIED TYPE: Primary | ICD-10-CM

## 2024-07-11 DIAGNOSIS — N18.2 CKD (CHRONIC KIDNEY DISEASE) STAGE 2, GFR 60-89 ML/MIN: Primary | ICD-10-CM

## 2024-07-13 ENCOUNTER — APPOINTMENT (OUTPATIENT)
Dept: LAB | Facility: HOSPITAL | Age: 69
End: 2024-07-13
Payer: MEDICARE

## 2024-07-13 DIAGNOSIS — N18.2 CKD (CHRONIC KIDNEY DISEASE) STAGE 2, GFR 60-89 ML/MIN: ICD-10-CM

## 2024-07-13 LAB
ALBUMIN SERPL BCG-MCNC: 4 G/DL (ref 3.5–5)
ALP SERPL-CCNC: 40 U/L (ref 34–104)
ALT SERPL W P-5'-P-CCNC: 17 U/L (ref 7–52)
ANION GAP SERPL CALCULATED.3IONS-SCNC: 9 MMOL/L (ref 4–13)
AST SERPL W P-5'-P-CCNC: 15 U/L (ref 13–39)
BILIRUB SERPL-MCNC: 0.67 MG/DL (ref 0.2–1)
BUN SERPL-MCNC: 23 MG/DL (ref 5–25)
CALCIUM SERPL-MCNC: 9.6 MG/DL (ref 8.4–10.2)
CHLORIDE SERPL-SCNC: 104 MMOL/L (ref 96–108)
CO2 SERPL-SCNC: 27 MMOL/L (ref 21–32)
CREAT SERPL-MCNC: 1.01 MG/DL (ref 0.6–1.3)
CREAT UR-MCNC: 169.3 MG/DL
ERYTHROCYTE [DISTWIDTH] IN BLOOD BY AUTOMATED COUNT: 13.4 % (ref 11.6–15.1)
FERRITIN SERPL-MCNC: 33 NG/ML (ref 24–336)
GFR SERPL CREATININE-BSD FRML MDRD: 74 ML/MIN/1.73SQ M
GLUCOSE P FAST SERPL-MCNC: 153 MG/DL (ref 65–99)
HCT VFR BLD AUTO: 46.4 % (ref 36.5–49.3)
HGB BLD-MCNC: 15 G/DL (ref 12–17)
IRON SATN MFR SERPL: 37 % (ref 15–50)
IRON SERPL-MCNC: 148 UG/DL (ref 50–212)
MCH RBC QN AUTO: 27.2 PG (ref 26.8–34.3)
MCHC RBC AUTO-ENTMCNC: 32.3 G/DL (ref 31.4–37.4)
MCV RBC AUTO: 84 FL (ref 82–98)
MICROALBUMIN UR-MCNC: 11.7 MG/L
MICROALBUMIN/CREAT 24H UR: 7 MG/G CREATININE (ref 0–30)
PLATELET # BLD AUTO: 197 THOUSANDS/UL (ref 149–390)
PMV BLD AUTO: 9.4 FL (ref 8.9–12.7)
POTASSIUM SERPL-SCNC: 4.2 MMOL/L (ref 3.5–5.3)
PROT SERPL-MCNC: 6.4 G/DL (ref 6.4–8.4)
RBC # BLD AUTO: 5.51 MILLION/UL (ref 3.88–5.62)
SODIUM SERPL-SCNC: 140 MMOL/L (ref 135–147)
TIBC SERPL-MCNC: 405 UG/DL (ref 250–450)
UIBC SERPL-MCNC: 257 UG/DL (ref 155–355)
WBC # BLD AUTO: 9.48 THOUSAND/UL (ref 4.31–10.16)

## 2024-07-13 PROCEDURE — 82728 ASSAY OF FERRITIN: CPT

## 2024-07-13 PROCEDURE — 83550 IRON BINDING TEST: CPT

## 2024-07-13 PROCEDURE — 83540 ASSAY OF IRON: CPT

## 2024-08-05 ENCOUNTER — HOSPITAL ENCOUNTER (EMERGENCY)
Facility: HOSPITAL | Age: 69
Discharge: HOME | End: 2024-08-05
Attending: EMERGENCY MEDICINE
Payer: MEDICARE

## 2024-08-05 ENCOUNTER — APPOINTMENT (EMERGENCY)
Dept: RADIOLOGY | Facility: HOSPITAL | Age: 69
End: 2024-08-05
Payer: MEDICARE

## 2024-08-05 ENCOUNTER — TELEPHONE (OUTPATIENT)
Dept: FAMILY MEDICINE CLINIC | Facility: CLINIC | Age: 69
End: 2024-08-05

## 2024-08-05 VITALS
WEIGHT: 293.6 LBS | RESPIRATION RATE: 20 BRPM | HEART RATE: 86 BPM | OXYGEN SATURATION: 92 % | SYSTOLIC BLOOD PRESSURE: 152 MMHG | BODY MASS INDEX: 42.13 KG/M2 | TEMPERATURE: 98.4 F | DIASTOLIC BLOOD PRESSURE: 81 MMHG

## 2024-08-05 DIAGNOSIS — M79.89 LEG SWELLING: ICD-10-CM

## 2024-08-05 DIAGNOSIS — R06.00 DYSPNEA: ICD-10-CM

## 2024-08-05 DIAGNOSIS — R09.02 HYPOXIA: ICD-10-CM

## 2024-08-05 DIAGNOSIS — R59.9 ENLARGED LYMPH NODE: ICD-10-CM

## 2024-08-05 DIAGNOSIS — J18.9 PNEUMONIA OF RIGHT LOWER LOBE DUE TO INFECTIOUS ORGANISM: Primary | ICD-10-CM

## 2024-08-05 DIAGNOSIS — R60.0 PERIPHERAL EDEMA: ICD-10-CM

## 2024-08-05 DIAGNOSIS — J18.9 PNEUMONIA: Primary | ICD-10-CM

## 2024-08-05 LAB
2HR DELTA HS TROPONIN: -1 NG/L
ALBUMIN SERPL BCG-MCNC: 4 G/DL (ref 3.5–5)
ALP SERPL-CCNC: 39 U/L (ref 34–104)
ALT SERPL W P-5'-P-CCNC: 21 U/L (ref 7–52)
ANION GAP SERPL CALCULATED.3IONS-SCNC: 7 MMOL/L (ref 4–13)
AST SERPL W P-5'-P-CCNC: 21 U/L (ref 13–39)
BASOPHILS # BLD AUTO: 0.03 THOUSANDS/ÂΜL (ref 0–0.1)
BASOPHILS NFR BLD AUTO: 1 % (ref 0–1)
BILIRUB SERPL-MCNC: 0.6 MG/DL (ref 0.2–1)
BNP SERPL-MCNC: 67 PG/ML (ref 0–100)
BUN SERPL-MCNC: 22 MG/DL (ref 5–25)
CALCIUM SERPL-MCNC: 9.7 MG/DL (ref 8.4–10.2)
CARDIAC TROPONIN I PNL SERPL HS: 8 NG/L
CARDIAC TROPONIN I PNL SERPL HS: 9 NG/L
CHLORIDE SERPL-SCNC: 105 MMOL/L (ref 96–108)
CO2 SERPL-SCNC: 27 MMOL/L (ref 21–32)
CREAT SERPL-MCNC: 1.13 MG/DL (ref 0.6–1.3)
D DIMER PPP FEU-MCNC: 1.14 UG/ML FEU
EOSINOPHIL # BLD AUTO: 0.18 THOUSAND/ÂΜL (ref 0–0.61)
EOSINOPHIL NFR BLD AUTO: 3 % (ref 0–6)
ERYTHROCYTE [DISTWIDTH] IN BLOOD BY AUTOMATED COUNT: 14.3 % (ref 11.6–15.1)
GFR SERPL CREATININE-BSD FRML MDRD: 65 ML/MIN/1.73SQ M
GLUCOSE SERPL-MCNC: 94 MG/DL (ref 65–140)
HCT VFR BLD AUTO: 43 % (ref 36.5–49.3)
HGB BLD-MCNC: 14.3 G/DL (ref 12–17)
IMM GRANULOCYTES # BLD AUTO: 0.05 THOUSAND/UL (ref 0–0.2)
IMM GRANULOCYTES NFR BLD AUTO: 1 % (ref 0–2)
LYMPHOCYTES # BLD AUTO: 1.13 THOUSANDS/ÂΜL (ref 0.6–4.47)
LYMPHOCYTES NFR BLD AUTO: 20 % (ref 14–44)
MCH RBC QN AUTO: 27.8 PG (ref 26.8–34.3)
MCHC RBC AUTO-ENTMCNC: 33.3 G/DL (ref 31.4–37.4)
MCV RBC AUTO: 84 FL (ref 82–98)
MONOCYTES # BLD AUTO: 0.77 THOUSAND/ÂΜL (ref 0.17–1.22)
MONOCYTES NFR BLD AUTO: 13 % (ref 4–12)
NEUTROPHILS # BLD AUTO: 3.62 THOUSANDS/ÂΜL (ref 1.85–7.62)
NEUTS SEG NFR BLD AUTO: 62 % (ref 43–75)
NRBC BLD AUTO-RTO: 0 /100 WBCS
PLATELET # BLD AUTO: 196 THOUSANDS/UL (ref 149–390)
PMV BLD AUTO: 9.4 FL (ref 8.9–12.7)
POTASSIUM SERPL-SCNC: 4.3 MMOL/L (ref 3.5–5.3)
PROT SERPL-MCNC: 7.2 G/DL (ref 6.4–8.4)
RBC # BLD AUTO: 5.15 MILLION/UL (ref 3.88–5.62)
SODIUM SERPL-SCNC: 139 MMOL/L (ref 135–147)
WBC # BLD AUTO: 5.78 THOUSAND/UL (ref 4.31–10.16)

## 2024-08-05 PROCEDURE — 71045 X-RAY EXAM CHEST 1 VIEW: CPT

## 2024-08-05 PROCEDURE — 71275 CT ANGIOGRAPHY CHEST: CPT

## 2024-08-05 PROCEDURE — 99284 EMERGENCY DEPT VISIT MOD MDM: CPT

## 2024-08-05 PROCEDURE — 93005 ELECTROCARDIOGRAM TRACING: CPT

## 2024-08-05 PROCEDURE — 83880 ASSAY OF NATRIURETIC PEPTIDE: CPT | Performed by: EMERGENCY MEDICINE

## 2024-08-05 PROCEDURE — 80053 COMPREHEN METABOLIC PANEL: CPT | Performed by: EMERGENCY MEDICINE

## 2024-08-05 PROCEDURE — 85025 COMPLETE CBC W/AUTO DIFF WBC: CPT | Performed by: EMERGENCY MEDICINE

## 2024-08-05 PROCEDURE — 94640 AIRWAY INHALATION TREATMENT: CPT

## 2024-08-05 PROCEDURE — 84484 ASSAY OF TROPONIN QUANT: CPT | Performed by: EMERGENCY MEDICINE

## 2024-08-05 PROCEDURE — 36415 COLL VENOUS BLD VENIPUNCTURE: CPT | Performed by: EMERGENCY MEDICINE

## 2024-08-05 PROCEDURE — 99285 EMERGENCY DEPT VISIT HI MDM: CPT | Performed by: EMERGENCY MEDICINE

## 2024-08-05 PROCEDURE — 85379 FIBRIN DEGRADATION QUANT: CPT | Performed by: EMERGENCY MEDICINE

## 2024-08-05 PROCEDURE — 96372 THER/PROPH/DIAG INJ SC/IM: CPT

## 2024-08-05 RX ORDER — ALBUTEROL SULFATE 2.5 MG/3ML
2.5 SOLUTION RESPIRATORY (INHALATION) EVERY 6 HOURS PRN
Qty: 75 ML | Refills: 0 | Status: SHIPPED | OUTPATIENT
Start: 2024-08-05

## 2024-08-05 RX ORDER — ALBUTEROL SULFATE 90 UG/1
2 AEROSOL, METERED RESPIRATORY (INHALATION) EVERY 6 HOURS PRN
Qty: 8.5 G | Refills: 0 | Status: SHIPPED | OUTPATIENT
Start: 2024-08-05

## 2024-08-05 RX ORDER — ENOXAPARIN SODIUM 150 MG/ML
1 INJECTION SUBCUTANEOUS ONCE
Status: COMPLETED | OUTPATIENT
Start: 2024-08-05 | End: 2024-08-05

## 2024-08-05 RX ORDER — AZITHROMYCIN 250 MG/1
TABLET, FILM COATED ORAL
Qty: 4 TABLET | Refills: 0 | Status: SHIPPED | OUTPATIENT
Start: 2024-08-05 | End: 2024-08-09

## 2024-08-05 RX ORDER — AZITHROMYCIN 250 MG/1
500 TABLET, FILM COATED ORAL ONCE
Status: COMPLETED | OUTPATIENT
Start: 2024-08-05 | End: 2024-08-05

## 2024-08-05 RX ORDER — IPRATROPIUM BROMIDE AND ALBUTEROL SULFATE 2.5; .5 MG/3ML; MG/3ML
3 SOLUTION RESPIRATORY (INHALATION) ONCE
Status: COMPLETED | OUTPATIENT
Start: 2024-08-05 | End: 2024-08-05

## 2024-08-05 RX ADMIN — IOHEXOL 100 ML: 350 INJECTION, SOLUTION INTRAVENOUS at 21:24

## 2024-08-05 RX ADMIN — ENOXAPARIN SODIUM 135 MG: 150 INJECTION SUBCUTANEOUS at 23:30

## 2024-08-05 RX ADMIN — IPRATROPIUM BROMIDE AND ALBUTEROL SULFATE 3 ML: .5; 3 SOLUTION RESPIRATORY (INHALATION) at 20:17

## 2024-08-05 RX ADMIN — AZITHROMYCIN DIHYDRATE 500 MG: 250 TABLET ORAL at 23:29

## 2024-08-05 NOTE — TELEPHONE ENCOUNTER
Phone call placed to pt and relayed recommendation from  to proceed to ER. Pt and his spouse verbalized understanding. Will proceed to ER at this time.

## 2024-08-05 NOTE — TELEPHONE ENCOUNTER
"Patient has appointment on 08/05/2024 with the following appointment note:    \"Still with heavy coughing, left calf swollen, painful.\"    Called patient to triage symptoms. Per patient, he has had a cough for approximately 6 weeks. Patient was previously seen for this on 07/09/2024. Patient states he is coughing up phlegm with this. Patient's wife states patient's chest \"sounds congested.\"    Patient also complains of L leg swelling in the calf area that started approximately 2 weeks ago. Patient states his calf feels tight and there is a lump that is not painful. Patient states the tightness/pain worsens with flexion of the foot. The pain does not worsen at night. Patient has not started any new medications and is taking his medications as prescribed. Patient's pain is not inhibiting him from walking. Patient does not have a history of blood clots, but does have a history of two aneurysms. Patient is not on anticoagulants. Patient denies chest pain, frothy pink sputum, shortness of breath, cold/blue feet, redness, warmth, fever, pain in other joints.     Please advise if patient's appointment for 08/06/2024 is appropriate.  "
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
## 2024-08-06 ENCOUNTER — HOSPITAL ENCOUNTER (OUTPATIENT)
Dept: RADIOLOGY | Facility: HOSPITAL | Age: 69
Discharge: HOME | End: 2024-08-06
Attending: EMERGENCY MEDICINE
Payer: MEDICARE

## 2024-08-06 ENCOUNTER — TELEPHONE (OUTPATIENT)
Dept: PULMONOLOGY | Facility: MEDICAL CENTER | Age: 69
End: 2024-08-06

## 2024-08-06 DIAGNOSIS — R60.0 PERIPHERAL EDEMA: ICD-10-CM

## 2024-08-06 LAB
ATRIAL RATE: 82 BPM
P AXIS: 18 DEGREES
PR INTERVAL: 180 MS
QRS AXIS: -46 DEGREES
QRSD INTERVAL: 136 MS
QT INTERVAL: 380 MS
QTC INTERVAL: 443 MS
T WAVE AXIS: 73 DEGREES
VENTRICULAR RATE: 82 BPM

## 2024-08-06 PROCEDURE — 93010 ELECTROCARDIOGRAM REPORT: CPT | Performed by: INTERNAL MEDICINE

## 2024-08-06 PROCEDURE — 93970 EXTREMITY STUDY: CPT | Performed by: SURGERY

## 2024-08-06 PROCEDURE — 93970 EXTREMITY STUDY: CPT

## 2024-08-06 NOTE — TELEPHONE ENCOUNTER
Aware. He will need a f/u CT scan of the chest in 3 months.   Get the venous duplex as planned.   Continue the antibiotic.  Concerned that it sounds like he had a lot of leg swelling in the ER- may need cardiology eval sooner than planned.

## 2024-08-06 NOTE — TELEPHONE ENCOUNTER
LM for patient to call office back to schedule Consult appointment for Lung Nodules. Schedule next available

## 2024-08-06 NOTE — DISCHARGE INSTRUCTIONS
You have chosen to leave against medical advice  Return to the ER for further concerns or worsening symptoms  Follow up with your primary care physician and pulmonology in 1-2 days  Contact the vascular department for venous doppler in the morning

## 2024-08-06 NOTE — TELEPHONE ENCOUNTER
Per patient's chart, patient has a STAT Jolie Duplex scheduled for today at 11:30AM. Please advise if patient should be scheduled for a follow-up or return to the ED. Patient left AMA.

## 2024-08-06 NOTE — TELEPHONE ENCOUNTER
Looks like he is being seen by cardiology tomorrow and pulmonary next week, so he can hold on my appointment for now while he sees both of these specialists.

## 2024-08-06 NOTE — TELEPHONE ENCOUNTER
Called patient, he's been advised that he can hold off on seeing Dr. Guillen for the time being while he is seeing cardiology and pulmonology. Patient is agreeable to this.

## 2024-08-06 NOTE — TELEPHONE ENCOUNTER
Dr. Guillen's message was conveyed to pt's wife Afshan French.  He will continue on abx, repeat CT in 3 months, and call pulm and cardiology for f/u appts as recommended by Dr. Guillen and the ED.  Venous duplex was done this morning. Pt states that she would like to make a f/u appt with Dr. Guillen to discuss everything if Dr. Guillen deems this necessary.  Soonest available scheduled for 09/04/2024 as an [facility].  Is Dr. Guillen ok with this?  Please advise pt's wife if appt needs to be changed.

## 2024-08-06 NOTE — TELEPHONE ENCOUNTER
Called patient, left message for patient to call the office back to advise him of Dr. Guillne's message.

## 2024-08-06 NOTE — TELEPHONE ENCOUNTER
Patient's wife called to report patient did go to ER last night, refused to stay in hospital. Is currently at home and per wife his symptoms are improving. He was treated for pneumonia and cleared of PE. Has vascular study today and will follow up with PCP. Please advise.

## 2024-08-07 ENCOUNTER — OFFICE VISIT (OUTPATIENT)
Dept: CARDIOLOGY CLINIC | Facility: CLINIC | Age: 69
End: 2024-08-07
Payer: MEDICARE

## 2024-08-07 ENCOUNTER — TELEPHONE (OUTPATIENT)
Age: 69
End: 2024-08-07

## 2024-08-07 VITALS
SYSTOLIC BLOOD PRESSURE: 114 MMHG | OXYGEN SATURATION: 96 % | WEIGHT: 289 LBS | BODY MASS INDEX: 41.37 KG/M2 | DIASTOLIC BLOOD PRESSURE: 74 MMHG | HEART RATE: 82 BPM | HEIGHT: 70 IN

## 2024-08-07 DIAGNOSIS — R60.9 EDEMA, UNSPECIFIED TYPE: ICD-10-CM

## 2024-08-07 DIAGNOSIS — I10 ESSENTIAL HYPERTENSION: Primary | ICD-10-CM

## 2024-08-07 DIAGNOSIS — E78.5 HYPERLIPIDEMIA, MILD: ICD-10-CM

## 2024-08-07 PROCEDURE — 99214 OFFICE O/P EST MOD 30 MIN: CPT | Performed by: NURSE PRACTITIONER

## 2024-08-07 RX ORDER — FUROSEMIDE 20 MG/1
20 TABLET ORAL DAILY
Qty: 4 TABLET | Refills: 0 | Status: SHIPPED | OUTPATIENT
Start: 2024-08-07 | End: 2024-08-13 | Stop reason: SDUPTHER
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
## 2024-08-07 NOTE — TELEPHONE ENCOUNTER
Spoke with patient's wife, seen in office today, prescription sent in for Lasix but it is not at the pharmacy. Reviewed chart, advised it was sent to the [facility] in [facility]. Verbalized understanding, able to get it from there.

## 2024-08-08 ENCOUNTER — HOSPITAL ENCOUNTER (OUTPATIENT)
Dept: NON INVASIVE DIAGNOSTICS | Facility: HOSPITAL | Age: 69
Discharge: HOME | End: 2024-08-08
Payer: MEDICARE

## 2024-08-08 VITALS
WEIGHT: 289 LBS | HEART RATE: 59 BPM | SYSTOLIC BLOOD PRESSURE: 152 MMHG | BODY MASS INDEX: 41.37 KG/M2 | DIASTOLIC BLOOD PRESSURE: 85 MMHG | HEIGHT: 70 IN

## 2024-08-08 DIAGNOSIS — I10 ESSENTIAL HYPERTENSION: ICD-10-CM

## 2024-08-08 DIAGNOSIS — E78.5 HYPERLIPIDEMIA, MILD: ICD-10-CM

## 2024-08-08 PROCEDURE — 93306 TTE W/DOPPLER COMPLETE: CPT

## 2024-08-08 PROCEDURE — 93306 TTE W/DOPPLER COMPLETE: CPT | Performed by: INTERNAL MEDICINE

## 2024-08-09 LAB
AORTIC ROOT: 4.6 CM
ASCENDING AORTA: 4.1 CM
AV LVOT PEAK GRADIENT: 5 MMHG
AV PEAK GRADIENT: 7 MMHG
BSA FOR ECHO PROCEDURE: 2.44 M2
DOP CALC LVOT AREA: 3.46 CM2
DOP CALC LVOT DIAMETER: 2.1 CM
E WAVE DECELERATION TIME: 276 MS
E/A RATIO: 0.69
FRACTIONAL SHORTENING: 29 (ref 28–44)
INTERVENTRICULAR SEPTUM IN DIASTOLE (PARASTERNAL SHORT AXIS VIEW): 1.2 CM
INTERVENTRICULAR SEPTUM: 1.2 CM (ref 0.6–1.1)
LAAS-AP2: 28.4 CM2
LAAS-AP4: 20.6 CM2
LEFT ATRIUM AREA SYSTOLE SINGLE PLANE A4C: 19.2 CM2
LEFT ATRIUM SIZE: 4 CM
LEFT ATRIUM VOLUME (MOD BIPLANE): 82 ML
LEFT ATRIUM VOLUME INDEX (MOD BIPLANE): 35 ML/M2
LEFT INTERNAL DIMENSION IN SYSTOLE: 3.6 CM (ref 2.1–4)
LEFT VENTRICULAR INTERNAL DIMENSION IN DIASTOLE: 5.1 CM (ref 3.5–6)
LEFT VENTRICULAR POSTERIOR WALL IN END DIASTOLE: 1.3 CM
LEFT VENTRICULAR STROKE VOLUME: 71 ML
LVSV (TEICH): 71 ML
MV E'TISSUE VEL-LAT: 8 CM/S
MV E'TISSUE VEL-SEP: 5 CM/S
MV PEAK A VEL: 0.86 M/S
MV PEAK E VEL: 59 CM/S
MV STENOSIS PRESSURE HALF TIME: 80 MS
MV VALVE AREA P 1/2 METHOD: 2.75
PV PEAK GRADIENT: 4 MMHG
RIGHT ATRIUM AREA SYSTOLE A4C: 16 CM2
RIGHT VENTRICLE ID DIMENSION: 3.4 CM
SINOTUBULAR JUNCTION: 3.9 CM
SL CV LEFT ATRIUM LENGTH A2C: 6.4 CM
SL CV LV EF: 57
SL CV PED ECHO LEFT VENTRICLE DIASTOLIC VOLUME (MOD BIPLANE) 2D: 125 ML
SL CV PED ECHO LEFT VENTRICLE SYSTOLIC VOLUME (MOD BIPLANE) 2D: 54 ML
SL CV SINUS OF VALSALVA 2D: 4.5 CM
STJ: 3.9 CM
TR MAX PG: 23 MMHG
TR PEAK VELOCITY: 2.4 M/S
TRICUSPID ANNULAR PLANE SYSTOLIC EXCURSION: 2.1 CM
TRICUSPID VALVE PEAK REGURGITATION VELOCITY: 2.41 M/S

## 2024-08-12 ENCOUNTER — TELEPHONE (OUTPATIENT)
Dept: CARDIOLOGY CLINIC | Facility: CLINIC | Age: 69
End: 2024-08-12

## 2024-08-12 ENCOUNTER — APPOINTMENT (OUTPATIENT)
Dept: LAB | Facility: CLINIC | Age: 69
End: 2024-08-12
Payer: MEDICARE

## 2024-08-12 ENCOUNTER — TELEPHONE (OUTPATIENT)
Age: 69
End: 2024-08-12

## 2024-08-12 ENCOUNTER — CONSULT (OUTPATIENT)
Dept: PULMONOLOGY | Facility: CLINIC | Age: 69
End: 2024-08-12
Payer: MEDICARE

## 2024-08-12 VITALS
WEIGHT: 287.8 LBS | TEMPERATURE: 97.7 F | BODY MASS INDEX: 41.3 KG/M2 | HEART RATE: 57 BPM | OXYGEN SATURATION: 96 % | SYSTOLIC BLOOD PRESSURE: 130 MMHG | DIASTOLIC BLOOD PRESSURE: 84 MMHG

## 2024-08-12 DIAGNOSIS — Z72.0 TOBACCO ABUSE: ICD-10-CM

## 2024-08-12 DIAGNOSIS — R05.2 SUBACUTE COUGH: ICD-10-CM

## 2024-08-12 DIAGNOSIS — R97.20 ELEVATED PSA: ICD-10-CM

## 2024-08-12 DIAGNOSIS — J18.9 PNEUMONIA OF RIGHT LOWER LOBE DUE TO INFECTIOUS ORGANISM: Primary | ICD-10-CM

## 2024-08-12 DIAGNOSIS — R60.9 EDEMA, UNSPECIFIED TYPE: ICD-10-CM

## 2024-08-12 DIAGNOSIS — I10 ESSENTIAL HYPERTENSION: ICD-10-CM

## 2024-08-12 DIAGNOSIS — E78.5 HYPERLIPIDEMIA, MILD: ICD-10-CM

## 2024-08-12 LAB
ANION GAP SERPL CALCULATED.3IONS-SCNC: 5 MMOL/L (ref 4–13)
BUN SERPL-MCNC: 28 MG/DL (ref 5–25)
CALCIUM SERPL-MCNC: 9.5 MG/DL (ref 8.4–10.2)
CHLORIDE SERPL-SCNC: 106 MMOL/L (ref 96–108)
CO2 SERPL-SCNC: 28 MMOL/L (ref 21–32)
CREAT SERPL-MCNC: 1.1 MG/DL (ref 0.6–1.3)
GFR SERPL CREATININE-BSD FRML MDRD: 67 ML/MIN/1.73SQ M
GLUCOSE P FAST SERPL-MCNC: 106 MG/DL (ref 65–99)
POTASSIUM SERPL-SCNC: 5.5 MMOL/L (ref 3.5–5.3)
SODIUM SERPL-SCNC: 139 MMOL/L (ref 135–147)

## 2024-08-12 PROCEDURE — 36415 COLL VENOUS BLD VENIPUNCTURE: CPT

## 2024-08-12 PROCEDURE — 80048 BASIC METABOLIC PNL TOTAL CA: CPT

## 2024-08-12 PROCEDURE — 99204 OFFICE O/P NEW MOD 45 MIN: CPT | Performed by: STUDENT IN AN ORGANIZED HEALTH CARE EDUCATION/TRAINING PROGRAM

## 2024-08-12 RX ORDER — ALPRAZOLAM 1 MG
TABLET ORAL
Qty: 1 TABLET | Refills: 0 | Status: SHIPPED | OUTPATIENT
Start: 2024-08-12

## 2024-08-12 NOTE — TELEPHONE ENCOUNTER
----- Message from MUNA Arellano sent at 8/12/2024  7:09 AM EDT -----  Please let patient know that his EF is within normal limits and his echo has not changed from previous study from 2015. There is noted dilation of his aortic root and he currently follows with CT surgery.

## 2024-08-12 NOTE — PROGRESS NOTES
Attending Statement    I was the supervising physician and personally saw/examined the patient on 8/12/2024.  I agree with the Resident/Fellow's note with the following additions/exceptions:    Imaging Studies were reviewed personally on the PAC system:   CTA   No pulmonary embolism in the main pulmonary artery and central pulmonary branches     Mosaic attenuation the lung parenchyma with interspersed lucent areas and areas of groundglass density small airway disease        Patchy airspace opacity right lung base, suggests evolving pneumonia. Follow-up at 3 months suggested to demonstrate resolution     Stable ascending aorta aneurysm measuring 5 cm for which surveillance can be continued     Small borderline mediastinal lymph nodes in the hilar region, periesophageal region, attention at follow-up suggested       TTE       Left Ventricle: Left ventricular cavity size is normal. Wall thickness is mildly increased. There is mild concentric hypertrophy. The left ventricular ejection fraction is 57% by single dimension measurement. Systolic function is normal. Wall motion is normal. Diastolic function is mildly abnormal, consistent with grade I (abnormal) relaxation.    Left Atrium: The atrium is mildly dilated.    Aorta: The sinus of valsalva mild-moderately dilated at 4.5cm and ascending aorta at 4.6cm    Prior TTE study available for jimena    Physical Exam:  General: NAD  Neuro: AxO 3  Heart: RRR  Lungs: CTAB  Abdomen: Soft NT   Extremities: ++ Pedal edema    Assessment:  69M with a PMH of HTN. AAA, Polycystic kidney disease and recent ED visit for cough who presents for evaluation of cough and wheezing.    8 Weeks cough, no infectious symptoms, no sick contacts -> PCP -> prednisone/inhaler - > Symptoms improved, continued cough -> Cough worsened, swelling worsening -> Started Lasix -> Cough now improved    Denies Fevers    Tobacco: Quit 1985 - 40 PY   Asthma Hx: None  Triggers/Allergies: Bee sting  Exposure/Work:   ,    Pets: Turtle   CHF Hx: HTN, HFpEF  Pulm Meds: None  ET: Limited by joints      Plan:  Subacute Cough -  May have initially had a viral illness but symptoms currently are more consistent with a cardiomyopathy. Patient has improved with starting a diuretic and will follow up with cardiology. Lower  concerns for acute pulmonary pathology but CT imaging showed some emphysema and had diffuses mosaic attenuation. Plan to evaluate for resolution with repeat CT chest and baseline PFTs  - CT Chest without contrast - Xanax 1 time dose for anxiety   - PFTs with BD  - Follow up 3 months       ANASTASIIA - Patietn is high risk for sleep apnea, witness snoring, thick neck, obesity. Plan for sleep test by primary.      Dominic Sorenson MD  [facility]

## 2024-08-12 NOTE — TELEPHONE ENCOUNTER
----- Message from MUNA Arellano sent at 8/12/2024  1:04 PM EDT -----  Please call patient, how is his lower extremity edema after he had the extra Lasix. Labs overall were stable except potassium is borderline elevated. Please have him avoid eating fruits high in potassium such as bananas and orange and potatoes

## 2024-08-12 NOTE — PROGRESS NOTES
"Ambulatory Visit  Name: Les Goddard      : 1955      MRN: [id number]  Encounter Provider: Dominic Sorenson MD  Encounter Date: 2024   Encounter department: [facility]    Assessment & Plan   1. Pneumonia of right lower lobe due to infectious organism  2. Subacute cough  -     Complete PFT with post bronchodilator; Future; Expected date: 2024  3. Tobacco abuse  -     Complete PFT with post bronchodilator; Future; Expected date: 2024  4. Elevated PSA  -     ALPRAZolam (XANAX) 1 mg tablet; Take  1 hour prior to the procedure  70 y/o M with reports of nonproductive cough x 8 weeks associated with wheezing, (-) fever, chills, recent illnesses. ED visit 2024 for cough and lower extremity swelling; CT in ED suggested evolving pneumonia, with a noted increase of previous aortic root enlargement. Patient was prescribed Lasix, Abx, and nebulizer/inhaler treatment with reported improvement of symptoms. On physical exam today, lungs clear to auscultation with no wheezing, LE edema present.  Symptoms with cough could be suggestive of cardiac origin -- patient advised to follow up with Cardiology and CT surgery. Baseline PFTs ordered and updated chest CT to be done prior to next visit. Lasix continued with BUN/Creat monitoring due to pmhx of CKD. Patient has existing script for sleep study because of pmhx of ANASTASIIA which he was also advised to schedule. Counseled on weight monitoring for water retention and was advised to avoid processed foods and follow low-sodium diet. Will follow-up with Dr. Sorenson in 3 months.    History of Present Illness     Les Goddard is a 69 y.o. male with a past medical history of hypertension, AAA, ANASTASIIA, and polycystic kidney disease who presents to clinic for evaluation 1-week after an ED visit. Patient reports a \"dry and coarse\" nonproductive cough x 8 weeks with associated wheezing. He notes that the cough is worse in the middle of the " "day and his wife at bedside describes it as \"tight with a high pitched squeak at the end.\" Patient denies any associated fever, cough, shortness of breath, congestion, sore throat, or sick contacts. He recalls seeing his PCP a month ago regarding his symptoms and was prescribed an inhaler/steroids which alleviated the wheezing but not the cough.    Patient's wife reports that she took the patient to the ED last week because of the persistent cough in addition to lower extremity edema with swelling in the left leg greater than right leg. His wife recalls that the patient's pulse ox that day was 88%. In the ED, workup for DVT/PE was unremarkable. CT revealed \"small borderline mediastinal lymph nodes in the hilar region; mosaic attenuation the lung parenchyma with interspersed lucent areas and areas of groundglass density small airway disease; patchy airspace opacity right lung base, suggests evolving pneumonia.\" The patient was treated for pneumonia and given a course of antibiotics, inhaler, and nebulizer treatment. He notes taking the antibiotics and using the inhaler/nebulizer at home every 6 hours with improvement of his cough.    Of note, recent ECHO done 8/8/2024 reveals \"increase aortic root enlargement, as also seen on last chest CT.\" Patient notes worsening of his lower extremity swelling and recalls being prescribed Lasix during his ED visit which he is still taking.    Patient denies any past history of asthma and states he has never had a PFT done. He does report a 40-pack year smoking history but notes that he quit 40 years ago.    Review of Systems   Constitutional:  Negative for chills, diaphoresis, fatigue and fever.   HENT:  Negative for congestion, rhinorrhea and sore throat.    Respiratory:  Positive for cough. Negative for chest tightness, shortness of breath and wheezing.    Cardiovascular:  Negative for chest pain.   Gastrointestinal:  Negative for abdominal pain, nausea and vomiting.   All other " systems reviewed and are negative.      Objective     /84   Pulse 57   Temp 97.7 °F (36.5 °C)   Wt 131 kg (287 lb 12.8 oz)   SpO2 96%   BMI 41.30 kg/m²     Physical Exam  Vitals reviewed.   Constitutional:       Appearance: Normal appearance.      Comments: Body mass index is 41.3 kg/m².   HENT:      Head: Normocephalic and atraumatic.      Nose: Nose normal.   Eyes:      Extraocular Movements: Extraocular movements intact.      Conjunctiva/sclera: Conjunctivae normal.   Cardiovascular:      Rate and Rhythm: Normal rate and regular rhythm.   Pulmonary:      Effort: Pulmonary effort is normal. No respiratory distress.      Breath sounds: Normal breath sounds. No wheezing.   Neurological:      General: No focal deficit present.      Mental Status: He is alert and oriented to person, place, and time.     Administrative Statements

## 2024-08-12 NOTE — TELEPHONE ENCOUNTER
Pt wife calling in she has a few questions.       1.Reports just had BUN/Creatinine after taking lasix for 4 days he does have kidney disease. Dr. Sorenson.    Need script for lasix sent to [facility] in [place]. The lasix were initially prescribed by an ED doctor.       Want to know if he should still be on the lasix and questions about the BUN going up      2. Put on nebulizer treatment and inhaler. Should he discontinue one or both or remain on both medications.

## 2024-08-12 NOTE — TELEPHONE ENCOUNTER
I spoke with patient, he reports that swelling has improved, but not completely.   Advised of avoiding high potassium foods. He verbalized understanding.

## 2024-08-13 DIAGNOSIS — E78.5 HYPERLIPIDEMIA, MILD: ICD-10-CM

## 2024-08-13 DIAGNOSIS — I10 ESSENTIAL HYPERTENSION: ICD-10-CM

## 2024-08-13 DIAGNOSIS — R60.9 EDEMA, UNSPECIFIED TYPE: ICD-10-CM

## 2024-08-13 RX ORDER — FUROSEMIDE 20 MG
20 TABLET ORAL DAILY
Qty: 4 TABLET | Refills: 0 | Status: SHIPPED | OUTPATIENT
Start: 2024-08-13 | End: 2024-08-14 | Stop reason: SDUPTHER

## 2024-08-13 NOTE — TELEPHONE ENCOUNTER
Pt out of lasix for last 3 days. Instructed by pulmonary to have refilled by our office. Please send refill to [facility] (not [facility])

## 2024-08-13 NOTE — TELEPHONE ENCOUNTER
Spoke with pt and his wife and made them aware that can take both Albuterol nebulizer and inhaler together or if prefer one then the other if needed. To follow up with Cardio regarding lasix

## 2024-08-14 ENCOUNTER — TELEPHONE (OUTPATIENT)
Age: 69
End: 2024-08-14

## 2024-08-14 ENCOUNTER — APPOINTMENT (OUTPATIENT)
Dept: LAB | Facility: HOSPITAL | Age: 69
End: 2024-08-14

## 2024-08-14 DIAGNOSIS — I10 ESSENTIAL HYPERTENSION: ICD-10-CM

## 2024-08-14 DIAGNOSIS — R60.9 EDEMA, UNSPECIFIED TYPE: ICD-10-CM

## 2024-08-14 DIAGNOSIS — E78.5 HYPERLIPIDEMIA, MILD: ICD-10-CM

## 2024-08-14 RX ORDER — FUROSEMIDE 20 MG
20 TABLET ORAL DAILY
Qty: 90 TABLET | Refills: 0 | Status: SHIPPED | OUTPATIENT
Start: 2024-08-14 | End: 2024-11-12

## 2024-08-14 NOTE — TELEPHONE ENCOUNTER
Afshan has questions in regards to husbands medication changes from the hospital and would like to discuss.

## 2024-08-14 NOTE — TELEPHONE ENCOUNTER
Wife called asking why only 4 tablet of lasix were called into the pharmacy.     She had requested 90 day supply.     Concerned about the BW being abnormal.  Does Nephrology need to be called she asking?    Please advise

## 2024-08-15 DIAGNOSIS — Q61.3 POLYCYSTIC KIDNEY DISEASE: Primary | ICD-10-CM

## 2024-08-15 DIAGNOSIS — Z00.6 ENCOUNTER FOR EXAMINATION FOR NORMAL COMPARISON OR CONTROL IN CLINICAL RESEARCH PROGRAM: ICD-10-CM

## 2024-08-16 ENCOUNTER — APPOINTMENT (OUTPATIENT)
Dept: LAB | Facility: HOSPITAL | Age: 69
End: 2024-08-16
Attending: PATHOLOGY

## 2024-08-16 DIAGNOSIS — Z00.6 ENCOUNTER FOR EXAMINATION FOR NORMAL COMPARISON OR CONTROL IN CLINICAL RESEARCH PROGRAM: ICD-10-CM

## 2024-08-16 PROCEDURE — 36415 COLL VENOUS BLD VENIPUNCTURE: CPT

## 2024-08-27 DIAGNOSIS — I12.9 BENIGN HYPERTENSION WITH CKD (CHRONIC KIDNEY DISEASE), STAGE II: ICD-10-CM

## 2024-08-27 DIAGNOSIS — I71.20 THORACIC AORTIC ANEURYSM (TAA), UNSPECIFIED PART, UNSPECIFIED WHETHER RUPTURED (HCC): ICD-10-CM

## 2024-08-27 DIAGNOSIS — N18.2 BENIGN HYPERTENSION WITH CKD (CHRONIC KIDNEY DISEASE), STAGE II: ICD-10-CM

## 2024-08-27 RX ORDER — METOPROLOL SUCCINATE 25 MG/1
25 TABLET, EXTENDED RELEASE ORAL DAILY
Qty: 90 TABLET | Refills: 1 | Status: SHIPPED | OUTPATIENT
Start: 2024-08-27

## 2024-08-28 ENCOUNTER — VBI (OUTPATIENT)
Dept: ADMINISTRATIVE | Facility: OTHER | Age: 69
End: 2024-08-28

## 2024-08-28 LAB
APOB+LDLR+PCSK9 GENE MUT ANL BLD/T: NOT DETECTED
BRCA1+BRCA2 DEL+DUP + FULL MUT ANL BLD/T: NOT DETECTED
MLH1+MSH2+MSH6+PMS2 GN DEL+DUP+FUL M: NOT DETECTED

## 2024-08-28 NOTE — TELEPHONE ENCOUNTER
08/28/24 11:01 AM    Patient contacted to bring Advance Directive, POLST, or Living Will document to next scheduled pcp visit.[facility] Department left message.    Thank you.  Lourdes Elena  [facility]

## 2024-09-04 ENCOUNTER — OFFICE VISIT (OUTPATIENT)
Dept: FAMILY MEDICINE CLINIC | Facility: CLINIC | Age: 69
End: 2024-09-04
Payer: MEDICARE

## 2024-09-04 VITALS
OXYGEN SATURATION: 96 % | TEMPERATURE: 97.6 F | WEIGHT: 280.6 LBS | SYSTOLIC BLOOD PRESSURE: 132 MMHG | DIASTOLIC BLOOD PRESSURE: 82 MMHG | BODY MASS INDEX: 40.17 KG/M2 | RESPIRATION RATE: 16 BRPM | HEIGHT: 70 IN | HEART RATE: 62 BPM

## 2024-09-04 DIAGNOSIS — I10 ESSENTIAL HYPERTENSION: Primary | ICD-10-CM

## 2024-09-04 DIAGNOSIS — J18.9 PNEUMONIA OF RIGHT LOWER LOBE DUE TO INFECTIOUS ORGANISM: ICD-10-CM

## 2024-09-04 DIAGNOSIS — R60.0 EDEMA OF BOTH LOWER EXTREMITIES: ICD-10-CM

## 2024-09-04 PROCEDURE — 99214 OFFICE O/P EST MOD 30 MIN: CPT | Performed by: FAMILY MEDICINE

## 2024-09-04 PROCEDURE — G2211 COMPLEX E/M VISIT ADD ON: HCPCS | Performed by: FAMILY MEDICINE

## 2024-09-04 NOTE — ASSESSMENT & PLAN NOTE
Blood pressure controlled today.  Patient follows with both nephrology and cardiology.  He had mild hyperkalemia on his last labs and has an order for a repeat.  We discussed dietary effects of sodium and caffeine on blood pressure.  Reviewed most recent echo.  He is doing well staying away from pepperoni and salami, which are 2 of his favorites.  Trying to substitute lower sodium options.

## 2024-09-04 NOTE — PROGRESS NOTES
Assessment/Plan:       Assessment & Plan  Essential hypertension  Blood pressure controlled today.  Patient follows with both nephrology and cardiology.  He had mild hyperkalemia on his last labs and has an order for a repeat.  We discussed dietary effects of sodium and caffeine on blood pressure.  Reviewed most recent echo.  He is doing well staying away from pepperoni and salami, which are 2 of his favorites.  Trying to substitute lower sodium options.       Pneumonia of right lower lobe due to infectious organism  Repeat CT ordered to follow up on the evolving right lower lobe infiltrate noted on CT scan as well as prominent mediastinal and paraesophageal lymph nodes.       Edema of both lower extremities  Improving bilateral lower extremity edema.  He is taking Lasix as prescribed.  He has a repeat BMP ordered for next week.                Most recent labs reviewed       Subjective:     Les is a 69 y.o. male here today and has the below chronic conditions:    Patient Active Problem List   Diagnosis    Polycystic kidney disease    Essential hypertension    Arterial ectasia (HCC)    Aortic aneurysm, thoracic (HCC)    Hepatic cyst    Femoral artery aneurysm, bilateral (HCC)    Hx of ischemic vertebrobasilar artery thalamic stroke    Hyperlipidemia, mild    Popliteal artery ectasias, bilateral (HCC)    BMI 40.0-44.9, adult (HCC)    Benign hypertension with CKD (chronic kidney disease), stage II    Elevated PSA    Calculus of kidney    Bradycardia with 41-50 beats per minute     Current Outpatient Medications   Medication Sig Dispense Refill    albuterol (2.5 mg/3 mL) 0.083 % nebulizer solution Take 3 mL (2.5 mg total) by nebulization every 6 (six) hours as needed for wheezing or shortness of breath 75 mL 0    albuterol (ProAir HFA) 90 mcg/act inhaler Inhale 2 puffs every 6 (six) hours as needed for wheezing 8.5 g 0    albuterol (Ventolin HFA) 90 mcg/act inhaler Inhale 2 puffs every 4 (four) hours as needed for  wheezing (Patient not taking: Reported on 8/12/2024) 18 g 0    ALPRAZolam (XANAX) 1 mg tablet Take  1 hour prior to the procedure 1 tablet 0    cholecalciferol (VITAMIN D3) 1,000 units tablet Take 1 tablet (1,000 Units total) by mouth daily Over the counter      EPINEPHrine (EpiPen 2-Mani) 0.3 mg/0.3 mL SOAJ Inject 0.3 mL (0.3 mg total) into a muscle once for 1 dose Then go to to the ER 0.6 mL 0    ferrous sulfate 325 (65 Fe) mg tablet Take 1 tablet (325 mg total) by mouth daily with breakfast 90 tablet 3    furosemide (LASIX) 20 mg tablet Take 1 tablet (20 mg total) by mouth daily 90 tablet 0    lisinopril (ZESTRIL) 5 mg tablet TAKE 1 TABLET DAILY 90 tablet 3    metoprolol succinate (TOPROL-XL) 25 mg 24 hr tablet TAKE 1 TABLET DAILY 90 tablet 1    TART CHERRY PO Take 3,600 mg by mouth in the morning       No current facility-administered medications for this visit.          HPI:  Chief Complaint   Patient presents with    Follow-up     ER f/u from visit 8/5/24. Pt denies any new problems or concerns since ER visit. Pt reports cough resolved.      - CC above per clinical staff and reviewed.    Patient is here with his wife for f/u from ER on 8/5/24.  He presented with shortness of breath, lower extremity edema, cough.  Pt declined admission and was discharged AMA with zithromax, albuterol, outpatient pulm, lovenox x 1 dose.  He had a negative venous duplex for DVT the next day.  No further anticoagulation was needed.      He had a CT scan in the ER which showed a patchy opacity, likely evolving pneumonia, in the right lower lung as well as prominent mediastinal and paraesophageal lymph nodes.  Has repeat CT ordered to f/u on these findings.    He has a sleep study scheduled for later this month on 9/24/2024.  His wife has noticed him having apneic episodes while sleeping.  He typically sleeps on his side.  He snores and has frequent pauses in his breathing.    He is working on reducing sodium in his diet.  He enjoys  "things like pepperoni and salami, but has been cutting those out and trying to find a lower sodium options that he enjoys.  His wife is keeping a close eye on his diet.  He drinks about 48 ounces of hot tea per day plus some additional flavored water daily.  He is taking Lasix regularly and tolerating this well.    He is not having any shortness of breath.  He notes that his legs are less swollen.  He has lost some weight.  He has had cardiology eval.  He has had an echo.  He has spoken to both pulmonary and nephrology about his recent ER visit.          The following portions of the patient's history were reviewed and updated as appropriate: allergies, current medications, past family history, past medical history, past social history, past surgical history and problem list.    ROS:  Review of Systems   As above    Objective:      /82   Pulse 62   Temp 97.6 °F (36.4 °C)   Resp 16   Ht 5' 10\" (1.778 m)   Wt 127 kg (280 lb 9.6 oz)   SpO2 96%   BMI 40.26 kg/m²   BP Readings from Last 3 Encounters:   09/04/24 132/82   08/12/24 130/84   08/08/24 152/85     Wt Readings from Last 3 Encounters:   09/04/24 127 kg (280 lb 9.6 oz)   08/12/24 131 kg (287 lb 12.8 oz)   08/08/24 131 kg (289 lb)               Physical Exam:   Physical Exam  Vitals and nursing note reviewed.   Constitutional:       General: He is not in acute distress.     Appearance: Normal appearance. He is not ill-appearing.   HENT:      Mouth/Throat:      Mouth: Mucous membranes are moist.   Neck:      Vascular: No carotid bruit.   Cardiovascular:      Rate and Rhythm: Normal rate and regular rhythm.      Heart sounds: No murmur heard.  Pulmonary:      Effort: Pulmonary effort is normal.      Breath sounds: Normal breath sounds.   Musculoskeletal:      Comments: No edema right lower extremity, 1+ edema left lower extremity.  Mild stasis dermatitis bilateral anterior shins.   Lymphadenopathy:      Cervical: No cervical adenopathy.   Neurological: "      Mental Status: He is alert and oriented to person, place, and time.   Psychiatric:         Mood and Affect: Mood normal.         Behavior: Behavior normal.

## 2024-09-16 ENCOUNTER — APPOINTMENT (OUTPATIENT)
Dept: LAB | Facility: HOSPITAL | Age: 69
End: 2024-09-16
Attending: INTERNAL MEDICINE
Payer: MEDICARE

## 2024-09-16 DIAGNOSIS — Q61.3 POLYCYSTIC KIDNEY DISEASE: ICD-10-CM

## 2024-09-16 LAB
ANION GAP SERPL CALCULATED.3IONS-SCNC: 7 MMOL/L (ref 4–13)
BUN SERPL-MCNC: 22 MG/DL (ref 5–25)
CALCIUM SERPL-MCNC: 9.3 MG/DL (ref 8.4–10.2)
CHLORIDE SERPL-SCNC: 107 MMOL/L (ref 96–108)
CO2 SERPL-SCNC: 24 MMOL/L (ref 21–32)
CREAT SERPL-MCNC: 0.92 MG/DL (ref 0.6–1.3)
GFR SERPL CREATININE-BSD FRML MDRD: 83 ML/MIN/1.73SQ M
GLUCOSE P FAST SERPL-MCNC: 103 MG/DL (ref 65–99)
POTASSIUM SERPL-SCNC: 4.1 MMOL/L (ref 3.5–5.3)
SODIUM SERPL-SCNC: 138 MMOL/L (ref 135–147)

## 2024-09-16 PROCEDURE — 80048 BASIC METABOLIC PNL TOTAL CA: CPT

## 2024-09-16 PROCEDURE — 36415 COLL VENOUS BLD VENIPUNCTURE: CPT

## 2024-09-19 DIAGNOSIS — I10 ESSENTIAL HYPERTENSION: ICD-10-CM

## 2024-09-19 RX ORDER — LISINOPRIL 5 MG/1
TABLET ORAL
Qty: 90 TABLET | Refills: 1 | Status: SHIPPED | OUTPATIENT
Start: 2024-09-19

## 2024-10-10 ENCOUNTER — HOSPITAL ENCOUNTER (OUTPATIENT)
Dept: SLEEP CENTER | Facility: CLINIC | Age: 69
Discharge: HOME | End: 2024-10-10
Payer: MEDICARE

## 2024-10-10 DIAGNOSIS — R06.81 WITNESSED EPISODE OF APNEA: ICD-10-CM

## 2024-10-10 DIAGNOSIS — R40.0 DAYTIME SOMNOLENCE: ICD-10-CM

## 2024-10-10 DIAGNOSIS — G47.30 SLEEP APNEA, UNSPECIFIED TYPE: ICD-10-CM

## 2024-10-10 DIAGNOSIS — R06.83 SNORING: ICD-10-CM

## 2024-10-10 PROCEDURE — G0399 HOME SLEEP TEST/TYPE 3 PORTA: HCPCS

## 2024-10-11 PROBLEM — G47.33 OSA (OBSTRUCTIVE SLEEP APNEA): Status: ACTIVE | Noted: 2024-10-11

## 2024-10-11 NOTE — PROGRESS NOTES
Home Sleep Study Documentation    HOME STUDY DEVICE: Noxturnal no                                           Julia G3 yes      Pre-Sleep Home Study:    Set-up and instructions performed by: JULIUS    Technician performed demonstration for Patient: yes    Return demonstration performed by Patient: yes    Written instructions provided to Patient: yes    Patient signed consent form: yes        Post-Sleep Home Study:    Additional comments by Patient: None    Home Sleep Study Failed:no:    Failure reason: N/A    Reported or Detected: N/A    Scored by: CHANDA Rodriguez

## 2024-10-12 PROCEDURE — 95806 SLEEP STUDY UNATT&RESP EFFT: CPT | Performed by: INTERNAL MEDICINE

## 2024-10-14 DIAGNOSIS — G47.33 OSA (OBSTRUCTIVE SLEEP APNEA): Primary | ICD-10-CM

## 2024-10-29 DIAGNOSIS — I10 ESSENTIAL HYPERTENSION: ICD-10-CM

## 2024-10-29 DIAGNOSIS — E78.5 HYPERLIPIDEMIA, MILD: ICD-10-CM

## 2024-10-29 DIAGNOSIS — R60.9 EDEMA, UNSPECIFIED TYPE: ICD-10-CM

## 2024-10-31 ENCOUNTER — TELEPHONE (OUTPATIENT)
Dept: SLEEP CENTER | Facility: CLINIC | Age: 69
End: 2024-10-31

## 2024-10-31 RX ORDER — FUROSEMIDE 20 MG/1
20 TABLET ORAL DAILY
Qty: 90 TABLET | Refills: 1 | Status: SHIPPED | OUTPATIENT
Start: 2024-10-31 | End: 2024-11-04 | Stop reason: SDUPTHER

## 2024-10-31 NOTE — TELEPHONE ENCOUNTER
Please let him know that the sleep medicine physician would be the one to order the CPAP and not me, so he still need to follow up with them and will need CPAP titration.  I'm happy to discuss with him, but let him know I advise to schedule the appointment so that he doesn't wait longer than he needs to.

## 2024-10-31 NOTE — TELEPHONE ENCOUNTER
Sleep study resulted and shows moderate sleep apnea with intermittent hypoxemia.      Ordering provider, PCP Dr. Guillen requests follow up with sleep medicine.  Referral placed.     Called patient and advised of above.  Offered to schedule sleep consult but patient declined.  States he has an appointment with Dr. Guillen coming up soon and he prefers to speak to her in person regarding the results before moving forward.      Provided patient with phone number to call if he'd like to schedule consult [phone] option 1 then option 1 again.

## 2024-11-04 ENCOUNTER — APPOINTMENT (OUTPATIENT)
Dept: LAB | Facility: HOSPITAL | Age: 69
End: 2024-11-04
Payer: MEDICARE

## 2024-11-04 DIAGNOSIS — I10 ESSENTIAL HYPERTENSION: ICD-10-CM

## 2024-11-04 DIAGNOSIS — R60.9 EDEMA, UNSPECIFIED TYPE: ICD-10-CM

## 2024-11-04 DIAGNOSIS — E78.5 HYPERLIPIDEMIA, MILD: ICD-10-CM

## 2024-11-04 DIAGNOSIS — R97.20 ELEVATED PSA: ICD-10-CM

## 2024-11-04 LAB — PSA SERPL-MCNC: 7.23 NG/ML (ref 0–4)

## 2024-11-04 PROCEDURE — 36415 COLL VENOUS BLD VENIPUNCTURE: CPT

## 2024-11-04 PROCEDURE — 84153 ASSAY OF PSA TOTAL: CPT

## 2024-11-04 NOTE — TELEPHONE ENCOUNTER
Medication: furosemide 20mg tablet     Dose/Frequency: 1 tablet daily    Quantity: 90 tablets    [facility]    Office:   [] PCP/Provider -   [x] Speciality/Provider -     Does the patient have enough for 3 days?   [x] Yes   [] No - Send as HP to POD

## 2024-11-05 RX ORDER — FUROSEMIDE 20 MG/1
20 TABLET ORAL DAILY
Qty: 90 TABLET | Refills: 1 | OUTPATIENT
Start: 2024-11-05 | End: 2025-02-03

## 2024-11-05 RX ORDER — FUROSEMIDE 20 MG/1
20 TABLET ORAL DAILY
Qty: 90 TABLET | Refills: 1 | Status: SHIPPED | OUTPATIENT
Start: 2024-11-05 | End: 2025-02-03

## 2024-11-06 ENCOUNTER — HOSPITAL ENCOUNTER (OUTPATIENT)
Dept: RADIOLOGY | Facility: HOSPITAL | Age: 69
Discharge: HOME | End: 2024-11-06
Payer: MEDICARE

## 2024-11-06 DIAGNOSIS — J18.9 PNEUMONIA OF RIGHT LOWER LOBE DUE TO INFECTIOUS ORGANISM: ICD-10-CM

## 2024-11-06 PROCEDURE — 71250 CT THORAX DX C-: CPT

## 2024-11-12 ENCOUNTER — PATIENT MESSAGE (OUTPATIENT)
Dept: FAMILY MEDICINE CLINIC | Facility: CLINIC | Age: 69
End: 2024-11-12

## 2024-11-12 ENCOUNTER — HOSPITAL ENCOUNTER (OUTPATIENT)
Dept: PULMONOLOGY | Facility: HOSPITAL | Age: 69
Discharge: HOME | End: 2024-11-12
Attending: STUDENT IN AN ORGANIZED HEALTH CARE EDUCATION/TRAINING PROGRAM
Payer: MEDICARE

## 2024-11-12 DIAGNOSIS — R05.2 SUBACUTE COUGH: ICD-10-CM

## 2024-11-12 DIAGNOSIS — Z72.0 TOBACCO ABUSE: ICD-10-CM

## 2024-11-12 PROCEDURE — 94729 DIFFUSING CAPACITY: CPT

## 2024-11-12 PROCEDURE — 94729 DIFFUSING CAPACITY: CPT | Performed by: INTERNAL MEDICINE

## 2024-11-12 PROCEDURE — 94060 EVALUATION OF WHEEZING: CPT

## 2024-11-12 PROCEDURE — 94726 PLETHYSMOGRAPHY LUNG VOLUMES: CPT | Performed by: INTERNAL MEDICINE

## 2024-11-12 PROCEDURE — 94760 N-INVAS EAR/PLS OXIMETRY 1: CPT

## 2024-11-12 PROCEDURE — 94060 EVALUATION OF WHEEZING: CPT | Performed by: INTERNAL MEDICINE

## 2024-11-12 PROCEDURE — 94726 PLETHYSMOGRAPHY LUNG VOLUMES: CPT

## 2024-11-12 RX ORDER — ALBUTEROL SULFATE 0.83 MG/ML
2.5 SOLUTION RESPIRATORY (INHALATION) ONCE
Status: COMPLETED | OUTPATIENT
Start: 2024-11-12 | End: 2024-11-12

## 2024-11-12 RX ADMIN — ALBUTEROL SULFATE 2.5 MG: 2.5 SOLUTION RESPIRATORY (INHALATION) at 10:02

## 2024-11-18 ENCOUNTER — RESULTS FOLLOW-UP (OUTPATIENT)
Dept: FAMILY MEDICINE CLINIC | Facility: CLINIC | Age: 69
End: 2024-11-18

## 2024-12-11 ENCOUNTER — OFFICE VISIT (OUTPATIENT)
Dept: PULMONOLOGY | Facility: MEDICAL CENTER | Age: 69
End: 2024-12-11
Payer: MEDICARE

## 2024-12-11 VITALS
RESPIRATION RATE: 12 BRPM | TEMPERATURE: 98.2 F | HEART RATE: 64 BPM | SYSTOLIC BLOOD PRESSURE: 122 MMHG | DIASTOLIC BLOOD PRESSURE: 74 MMHG | BODY MASS INDEX: 40.89 KG/M2 | OXYGEN SATURATION: 97 % | WEIGHT: 285.6 LBS | HEIGHT: 70 IN

## 2024-12-11 DIAGNOSIS — R05.2 SUBACUTE COUGH: Primary | ICD-10-CM

## 2024-12-11 PROCEDURE — G2211 COMPLEX E/M VISIT ADD ON: HCPCS | Performed by: STUDENT IN AN ORGANIZED HEALTH CARE EDUCATION/TRAINING PROGRAM

## 2024-12-11 PROCEDURE — 99213 OFFICE O/P EST LOW 20 MIN: CPT | Performed by: STUDENT IN AN ORGANIZED HEALTH CARE EDUCATION/TRAINING PROGRAM
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
## 2024-12-12 PROBLEM — R05.2 SUBACUTE COUGH: Status: RESOLVED | Noted: 2024-11-12 | Resolved: 2024-12-12

## 2025-01-24 ENCOUNTER — PATIENT MESSAGE (OUTPATIENT)
Dept: UROLOGY | Facility: CLINIC | Age: 70
End: 2025-01-24

## 2025-01-24 ENCOUNTER — TELEPHONE (OUTPATIENT)
Age: 70
End: 2025-01-24

## 2025-01-24 DIAGNOSIS — E55.9 VITAMIN D DEFICIENCY: ICD-10-CM

## 2025-01-24 DIAGNOSIS — Q61.3 POLYCYSTIC KIDNEY DISEASE: Primary | ICD-10-CM

## 2025-01-24 NOTE — TELEPHONE ENCOUNTER
Wife calling in regards to Les 2/19 appt. She is asking if he needs labs done? No active labs in the system at this time. She is asking if they are ordered if they can be mailed to their home address? Address on file was verified.

## 2025-01-24 NOTE — PATIENT COMMUNICATION
Patients last PSA was November. Please advise.     Would you like additional PSA done prior to appt in February     Component  Ref Range & Units (hover) 11/4/24 1021 3/18/24 1032 2/22/24 0835 8/21/23 1414 2/20/23 1028 8/15/22 1259 2/21/22 1227   PSA, Diagnostic 7.233 High  7.24 High  R, CM 6.69 High  R, CM 4.87 High  R, CM 5.7 High  R, CM 5.0 High  R, CM 5.5 High  R,

## 2025-02-05 ENCOUNTER — APPOINTMENT (OUTPATIENT)
Dept: LAB | Facility: HOSPITAL | Age: 70
End: 2025-02-05
Payer: MEDICARE

## 2025-02-05 DIAGNOSIS — Q61.3 POLYCYSTIC KIDNEY DISEASE: ICD-10-CM

## 2025-02-05 DIAGNOSIS — E55.9 VITAMIN D DEFICIENCY: ICD-10-CM

## 2025-02-05 LAB
25(OH)D3 SERPL-MCNC: 37.8 NG/ML (ref 30–100)
ALBUMIN SERPL BCG-MCNC: 4 G/DL (ref 3.5–5)
ALP SERPL-CCNC: 30 U/L (ref 34–104)
ALT SERPL W P-5'-P-CCNC: 14 U/L (ref 7–52)
ANION GAP SERPL CALCULATED.3IONS-SCNC: 8 MMOL/L (ref 4–13)
AST SERPL W P-5'-P-CCNC: 17 U/L (ref 13–39)
BACTERIA UR QL AUTO: ABNORMAL /HPF
BASOPHILS # BLD AUTO: 0.03 THOUSANDS/ΜL (ref 0–0.1)
BASOPHILS NFR BLD AUTO: 1 % (ref 0–1)
BILIRUB SERPL-MCNC: 0.77 MG/DL (ref 0.2–1)
BILIRUB UR QL STRIP: NEGATIVE
BUN SERPL-MCNC: 24 MG/DL (ref 5–25)
CALCIUM SERPL-MCNC: 9.1 MG/DL (ref 8.4–10.2)
CHLORIDE SERPL-SCNC: 108 MMOL/L (ref 96–108)
CLARITY UR: CLEAR
CO2 SERPL-SCNC: 22 MMOL/L (ref 21–32)
COLOR UR: YELLOW
CREAT SERPL-MCNC: 1.04 MG/DL (ref 0.6–1.3)
EOSINOPHIL # BLD AUTO: 0.09 THOUSAND/ΜL (ref 0–0.61)
EOSINOPHIL NFR BLD AUTO: 2 % (ref 0–6)
ERYTHROCYTE [DISTWIDTH] IN BLOOD BY AUTOMATED COUNT: 13.4 % (ref 11.6–15.1)
GFR SERPL CREATININE-BSD FRML MDRD: 71 ML/MIN/1.73SQ M
GLUCOSE P FAST SERPL-MCNC: 96 MG/DL (ref 65–99)
GLUCOSE UR STRIP-MCNC: NEGATIVE MG/DL
HCT VFR BLD AUTO: 45.5 % (ref 36.5–49.3)
HGB BLD-MCNC: 15.2 G/DL (ref 12–17)
HGB UR QL STRIP.AUTO: ABNORMAL
IMM GRANULOCYTES # BLD AUTO: 0.03 THOUSAND/UL (ref 0–0.2)
IMM GRANULOCYTES NFR BLD AUTO: 1 % (ref 0–2)
KETONES UR STRIP-MCNC: NEGATIVE MG/DL
LEUKOCYTE ESTERASE UR QL STRIP: NEGATIVE
LYMPHOCYTES # BLD AUTO: 0.86 THOUSANDS/ΜL (ref 0.6–4.47)
LYMPHOCYTES NFR BLD AUTO: 16 % (ref 14–44)
MCH RBC QN AUTO: 28 PG (ref 26.8–34.3)
MCHC RBC AUTO-ENTMCNC: 33.4 G/DL (ref 31.4–37.4)
MCV RBC AUTO: 84 FL (ref 82–98)
MONOCYTES # BLD AUTO: 0.54 THOUSAND/ΜL (ref 0.17–1.22)
MONOCYTES NFR BLD AUTO: 10 % (ref 4–12)
NEUTROPHILS # BLD AUTO: 3.88 THOUSANDS/ΜL (ref 1.85–7.62)
NEUTS SEG NFR BLD AUTO: 70 % (ref 43–75)
NITRITE UR QL STRIP: NEGATIVE
NON-SQ EPI CELLS URNS QL MICRO: ABNORMAL /HPF
NRBC BLD AUTO-RTO: 0 /100 WBCS
PH UR STRIP.AUTO: 6 [PH]
PLATELET # BLD AUTO: 189 THOUSANDS/UL (ref 149–390)
PMV BLD AUTO: 9.8 FL (ref 8.9–12.7)
POTASSIUM SERPL-SCNC: 4.3 MMOL/L (ref 3.5–5.3)
PROT SERPL-MCNC: 6.8 G/DL (ref 6.4–8.4)
PROT UR STRIP-MCNC: NEGATIVE MG/DL
RBC # BLD AUTO: 5.42 MILLION/UL (ref 3.88–5.62)
RBC #/AREA URNS AUTO: ABNORMAL /HPF
SODIUM SERPL-SCNC: 138 MMOL/L (ref 135–147)
SP GR UR STRIP.AUTO: 1.02 (ref 1–1.03)
UROBILINOGEN UR STRIP-ACNC: 2 MG/DL
WBC # BLD AUTO: 5.43 THOUSAND/UL (ref 4.31–10.16)
WBC #/AREA URNS AUTO: ABNORMAL /HPF

## 2025-02-05 PROCEDURE — 80053 COMPREHEN METABOLIC PANEL: CPT

## 2025-02-05 PROCEDURE — 85025 COMPLETE CBC W/AUTO DIFF WBC: CPT

## 2025-02-05 PROCEDURE — 82570 ASSAY OF URINE CREATININE: CPT

## 2025-02-05 PROCEDURE — 81001 URINALYSIS AUTO W/SCOPE: CPT

## 2025-02-05 PROCEDURE — 36415 COLL VENOUS BLD VENIPUNCTURE: CPT

## 2025-02-05 PROCEDURE — 82306 VITAMIN D 25 HYDROXY: CPT

## 2025-02-05 PROCEDURE — 82043 UR ALBUMIN QUANTITATIVE: CPT

## 2025-02-06 LAB
CREAT UR-MCNC: 160.6 MG/DL
MICROALBUMIN UR-MCNC: <7 MG/L

## 2025-02-07 ENCOUNTER — OFFICE VISIT (OUTPATIENT)
Dept: CARDIOLOGY CLINIC | Facility: CLINIC | Age: 70
End: 2025-02-07
Payer: MEDICARE

## 2025-02-07 ENCOUNTER — APPOINTMENT (OUTPATIENT)
Dept: LAB | Facility: HOSPITAL | Age: 70
End: 2025-02-07
Attending: INTERNAL MEDICINE
Payer: MEDICARE

## 2025-02-07 ENCOUNTER — RESULTS FOLLOW-UP (OUTPATIENT)
Dept: CARDIOLOGY CLINIC | Facility: CLINIC | Age: 70
End: 2025-02-07

## 2025-02-07 VITALS
HEIGHT: 70 IN | WEIGHT: 286 LBS | DIASTOLIC BLOOD PRESSURE: 80 MMHG | SYSTOLIC BLOOD PRESSURE: 110 MMHG | BODY MASS INDEX: 40.94 KG/M2 | HEART RATE: 54 BPM

## 2025-02-07 DIAGNOSIS — R00.1 BRADYCARDIA WITH 41-50 BEATS PER MINUTE: ICD-10-CM

## 2025-02-07 DIAGNOSIS — E78.5 HYPERLIPIDEMIA, MILD: ICD-10-CM

## 2025-02-07 DIAGNOSIS — I71.21 ANEURYSM OF ASCENDING AORTA WITHOUT RUPTURE (HCC): ICD-10-CM

## 2025-02-07 DIAGNOSIS — I10 ESSENTIAL HYPERTENSION: Primary | ICD-10-CM

## 2025-02-07 LAB
CHOLEST SERPL-MCNC: 159 MG/DL (ref ?–200)
HDLC SERPL-MCNC: 44 MG/DL
LDLC SERPL CALC-MCNC: 100 MG/DL (ref 0–100)
NONHDLC SERPL-MCNC: 115 MG/DL
TRIGL SERPL-MCNC: 77 MG/DL (ref ?–150)

## 2025-02-07 PROCEDURE — 80061 LIPID PANEL: CPT

## 2025-02-07 PROCEDURE — 99214 OFFICE O/P EST MOD 30 MIN: CPT | Performed by: INTERNAL MEDICINE

## 2025-02-07 PROCEDURE — 36415 COLL VENOUS BLD VENIPUNCTURE: CPT

## 2025-02-07 PROCEDURE — 93000 ELECTROCARDIOGRAM COMPLETE: CPT | Performed by: INTERNAL MEDICINE
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
## 2025-02-10 NOTE — RESULT ENCOUNTER NOTE
Called and spoke to patient's wife.  Patient and wife did see results on MyChart as well.  Patient's wife was very thankful for the call.

## 2025-02-19 ENCOUNTER — OFFICE VISIT (OUTPATIENT)
Dept: NEPHROLOGY | Facility: CLINIC | Age: 70
End: 2025-02-19
Payer: MEDICARE

## 2025-02-19 VITALS
HEIGHT: 70 IN | OXYGEN SATURATION: 97 % | HEART RATE: 58 BPM | DIASTOLIC BLOOD PRESSURE: 82 MMHG | BODY MASS INDEX: 40.23 KG/M2 | WEIGHT: 281 LBS | SYSTOLIC BLOOD PRESSURE: 116 MMHG

## 2025-02-19 DIAGNOSIS — I71.21 ANEURYSM OF ASCENDING AORTA WITHOUT RUPTURE (HCC): ICD-10-CM

## 2025-02-19 DIAGNOSIS — Q61.3 POLYCYSTIC KIDNEY DISEASE: ICD-10-CM

## 2025-02-19 DIAGNOSIS — N18.2 BENIGN HYPERTENSION WITH CKD (CHRONIC KIDNEY DISEASE), STAGE II: Primary | ICD-10-CM

## 2025-02-19 DIAGNOSIS — I12.9 BENIGN HYPERTENSION WITH CKD (CHRONIC KIDNEY DISEASE), STAGE II: Primary | ICD-10-CM

## 2025-02-19 DIAGNOSIS — R97.20 ELEVATED PSA: ICD-10-CM

## 2025-02-19 DIAGNOSIS — Z72.0 TOBACCO ABUSE: ICD-10-CM

## 2025-02-19 PROCEDURE — G2211 COMPLEX E/M VISIT ADD ON: HCPCS | Performed by: INTERNAL MEDICINE

## 2025-02-19 PROCEDURE — 99214 OFFICE O/P EST MOD 30 MIN: CPT | Performed by: INTERNAL MEDICINE

## 2025-02-19 RX ORDER — ALPRAZOLAM 1 MG/1
1 TABLET ORAL
Qty: 1 TABLET | Refills: 0 | Status: SHIPPED | OUTPATIENT
Start: 2025-02-19

## 2025-02-19 RX ORDER — ALPRAZOLAM 1 MG/1
TABLET ORAL
Qty: 1 TABLET | Refills: 0 | Status: SHIPPED | OUTPATIENT
Start: 2025-02-19

## 2025-02-19 NOTE — ASSESSMENT & PLAN NOTE
--Asymptomatic  --Continue follow-up with urology  -- MRI of the prostate: PI-RADSv2.1 Category 2 - Low (clinically significant cancer is unlikely to be present).  No extraprostatic tumor.  Marked BPH with calculated prostate volume of 85 mL  --PSA level greater than 7    Orders:    MRI abdomen wo contrast; Future    Albumin / creatinine urine ratio; Future    Comprehensive metabolic panel; Future    CBC and Platelet; Future    ALPRAZolam (XANAX) 1 mg tablet; Take 1 tablet (1 mg total) by mouth daily at bedtime as needed for anxiety (1 hour before MRI)    ALPRAZolam (XANAX) 1 mg tablet; Take  1 hour prior to the procedure

## 2025-02-19 NOTE — ASSESSMENT & PLAN NOTE
-- Smoking cessation strongly advised  Orders:    MRI abdomen wo contrast; Future    Albumin / creatinine urine ratio; Future    Comprehensive metabolic panel; Future    CBC and Platelet; Future    ALPRAZolam (XANAX) 1 mg tablet; Take 1 tablet (1 mg total) by mouth daily at bedtime as needed for anxiety (1 hour before MRI)

## 2025-02-19 NOTE — PROGRESS NOTES
Name: Les Goddard      : 1955      MRN: [id number]  Encounter Provider: Shannan Davies MD  Encounter Date: 2025   Encounter department: [facility]  :  Assessment & Plan  Benign hypertension with CKD (chronic kidney disease), stage II  Lab Results   Component Value Date    EGFR 71 2025    EGFR 83 2024    EGFR 67 2024    CREATININE 1.04 2025    CREATININE 0.92 2024    CREATININE 1.10 2024      Chronic kidney disease stage II  --baseline creatinine low 1s, 0.9-1.2 mg/dL  --in setting of polycystic kidney disease and hypertension  --[place] urine analysis.  Albumin/creatinine ratio was normal.  There is no evidence of proteinuria  --Continue RAAS blockade with lisinopril  --Weight loss exercise diet strongly recommended    Hypertension  - Has a history of a 5 cm thoracic abdominal aortic aneurysm and ideally would aim to keep his systolic blood pressure less than 120 as tolerated  -target blood pressure less than 120/80  -continue metoprolol XL 75 mg daily, and lisinopril  -Blood pressure is usually under excellent control.  Slightly above target today but he got lost before finding this office  -Continue ALKA blockade in the setting of polycystic kidney disease    Family history of strokes  - CTA was negative for aneurysm  - Old left thalamic infarct.  -Continue blood pressure control    Orders:    MRI abdomen wo contrast; Future    Albumin / creatinine urine ratio; Future    Comprehensive metabolic panel; Future    CBC and Platelet; Future    ALPRAZolam (XANAX) 1 mg tablet; Take 1 tablet (1 mg total) by mouth daily at bedtime as needed for anxiety (1 hour before MRI)    Aneurysm of ascending aorta without rupture (HCC)  -- Continue good blood pressure control  --Lipid panel is negative  --Smoking cessation strongly advised  Orders:    MRI abdomen wo contrast; Future    Albumin / creatinine urine ratio; Future    Comprehensive metabolic panel;  Future    CBC and Platelet; Future    ALPRAZolam (XANAX) 1 mg tablet; Take 1 tablet (1 mg total) by mouth daily at bedtime as needed for anxiety (1 hour before MRI)    Polycystic kidney disease  - Strong family history with evidence of cysts on both kidneys that are multiple and cyst on the liver  - CTA of the head and neck was negative for aneurysm. No repeat is needed  - Creatinine baseline is around 1-1.2 mg/dL  -[place] urine analysis  -No evidence of proteinuria  -Renal function remains stable with a creatinine of 1 mg/dL and a GFR greater than 60 mL/min  -Currently asymptomatic no gross hematuria no flank pain  --MRI from 2023 reviewed.  Simple renal and hepatic cysts.  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  No suspicious renal lesions.  -Continue current management  -Repeat MRI without contrast this year.  Will give him 1 mg of Xanax 1 hour prior to procedure    Orders:    MRI abdomen wo contrast; Future    Albumin / creatinine urine ratio; Future    Comprehensive metabolic panel; Future    CBC and Platelet; Future    ALPRAZolam (XANAX) 1 mg tablet; Take 1 tablet (1 mg total) by mouth daily at bedtime as needed for anxiety (1 hour before MRI)    BMI 40.0-44.9, adult (HCC)  -- BMI 40.3  Orders:    MRI abdomen wo contrast; Future    Albumin / creatinine urine ratio; Future    Comprehensive metabolic panel; Future    CBC and Platelet; Future    ALPRAZolam (XANAX) 1 mg tablet; Take 1 tablet (1 mg total) by mouth daily at bedtime as needed for anxiety (1 hour before MRI)    Tobacco abuse  -- Smoking cessation strongly advised  Orders:    MRI abdomen wo contrast; Future    Albumin / creatinine urine ratio; Future    Comprehensive metabolic panel; Future    CBC and Platelet; Future    ALPRAZolam (XANAX) 1 mg tablet; Take 1 tablet (1 mg total) by mouth daily at bedtime as needed for anxiety (1 hour before MRI)    Elevated PSA  --Asymptomatic  --Continue  follow-up with urology  -- MRI of the prostate: PI-RADSv2.1 Category 2 - Low (clinically significant cancer is unlikely to be present).  No extraprostatic tumor.  Marked BPH with calculated prostate volume of 85 mL  --PSA level greater than 7    Orders:    MRI abdomen wo contrast; Future    Albumin / creatinine urine ratio; Future    Comprehensive metabolic panel; Future    CBC and Platelet; Future    ALPRAZolam (XANAX) 1 mg tablet; Take 1 tablet (1 mg total) by mouth daily at bedtime as needed for anxiety (1 hour before MRI)    ALPRAZolam (XANAX) 1 mg tablet; Take  1 hour prior to the procedure        History of Present Illness   HPI  Les Goddard is a 69 y.o. male who presents follow-up of chronic kidney disease stage II and hypertension.  Following with urology for his elevated PSA level currently asymptomatic.  Reports no voiding issues.  Blood pressures under excellent control.  Plan for repeat MRI of the kidneys to calculate total kidney volume.  I have also provided him Xanax to take 1 mg preprocedure for anxiety.    Reviewed his blood work from February 5.  Albumin/creatinine ratio is normal.  Vitamin D is normal.  Lipid panel is normal.  Urine analysis is bland.  Renal function stable with a creatinine 1 mg/dL and a GFR grain 70 mL/min.  Electrolytes are normal.    History obtained from: patient and patient's Significant Other    Review of Systems   Constitutional:  Negative for activity change and fever.   Respiratory:  Negative for cough, chest tightness, shortness of breath and wheezing.    Cardiovascular:  Negative for chest pain and leg swelling.   Gastrointestinal:  Negative for abdominal pain, diarrhea, nausea and vomiting.   Endocrine: Negative for polyuria.   Genitourinary:  Negative for difficulty urinating, dysuria, flank pain, frequency and urgency.   Skin:  Negative for rash.   Neurological:  Negative for dizziness, syncope, light-headedness and headaches.     Current Outpatient Medications  "on File Prior to Visit   Medication Sig Dispense Refill    albuterol (Ventolin HFA) 90 mcg/act inhaler Inhale 2 puffs every 4 (four) hours as needed for wheezing 18 g 0    cholecalciferol (VITAMIN D3) 1,000 units tablet Take 1 tablet (1,000 Units total) by mouth daily Over the counter      ferrous sulfate 325 (65 Fe) mg tablet Take 1 tablet (325 mg total) by mouth daily with breakfast 90 tablet 3    furosemide (LASIX) 20 mg tablet Take 1 tablet (20 mg total) by mouth daily 90 tablet 1    lisinopril (ZESTRIL) 5 mg tablet TAKE 1 TABLET DAILY 90 tablet 1    metoprolol succinate (TOPROL-XL) 25 mg 24 hr tablet TAKE 1 TABLET DAILY 90 tablet 1    TART CHERRY PO Take 3,600 mg by mouth in the morning      albuterol (2.5 mg/3 mL) 0.083 % nebulizer solution Take 3 mL (2.5 mg total) by nebulization every 6 (six) hours as needed for wheezing or shortness of breath (Patient not taking: Reported on 12/11/2024) 75 mL 0    albuterol (ProAir HFA) 90 mcg/act inhaler Inhale 2 puffs every 6 (six) hours as needed for wheezing 8.5 g 0    EPINEPHrine (EpiPen 2-Mani) 0.3 mg/0.3 mL SOAJ Inject 0.3 mL (0.3 mg total) into a muscle once for 1 dose Then go to to the ER 0.6 mL 0    [DISCONTINUED] ALPRAZolam (XANAX) 1 mg tablet Take  1 hour prior to the procedure (Patient not taking: Reported on 2/19/2025) 1 tablet 0     No current facility-administered medications on file prior to visit.         Objective   /82 (BP Location: Left arm, Patient Position: Sitting, Cuff Size: Standard)   Pulse 58   Ht 5' 10\" (1.778 m)   Wt 127 kg (281 lb)   SpO2 97%   BMI 40.32 kg/m²      Physical Exam  Vitals and nursing note reviewed. Exam conducted with a chaperone present.   Constitutional:       General: He is not in acute distress.     Appearance: He is well-developed. He is obese. He is not diaphoretic.   HENT:      Head: Normocephalic and atraumatic.   Eyes:      General: No scleral icterus.     Pupils: Pupils are equal, round, and reactive to " light.   Cardiovascular:      Rate and Rhythm: Normal rate and regular rhythm.      Heart sounds: Normal heart sounds. No murmur heard.     No friction rub. No gallop.   Pulmonary:      Effort: Pulmonary effort is normal. No respiratory distress.      Breath sounds: Normal breath sounds. No wheezing or rales.   Chest:      Chest wall: No tenderness.   Abdominal:      General: Bowel sounds are normal. There is no distension.      Palpations: Abdomen is soft.      Tenderness: There is no abdominal tenderness. There is no rebound.   Musculoskeletal:         General: Normal range of motion.      Cervical back: Normal range of motion and neck supple.   Skin:     Findings: No rash.   Neurological:      Mental Status: He is alert and oriented to person, place, and time.         Administrative Statements   I have spent a total time of 20 minutes in caring for this patient on the day of the visit/encounter including Impressions, Counseling / Coordination of care, Documenting in the medical record, Reviewing/placing orders in the medical record (including tests, medications, and/or procedures), and Obtaining or reviewing history  .

## 2025-02-19 NOTE — ASSESSMENT & PLAN NOTE
-- BMI 40.3  Orders:    MRI abdomen wo contrast; Future    Albumin / creatinine urine ratio; Future    Comprehensive metabolic panel; Future    CBC and Platelet; Future    ALPRAZolam (XANAX) 1 mg tablet; Take 1 tablet (1 mg total) by mouth daily at bedtime as needed for anxiety (1 hour before MRI)

## 2025-02-19 NOTE — PATIENT INSTRUCTIONS
1.)  Low 2 g sodium diet    2.)  Monitor weights at home    3.)  Avoid NSAIDs (ibuprofen, Motrin, Advil, Aleve, naproxen)    4.)  Monitor blood pressure at home, call if blood pressure greater than 150/90 persistently    5.) I will plan to discuss all results including blood work, and/or imaging at our next visit, unless there is an urgent indication, in which case I will call you earlier. If you have any questions or concerns about your results, please feel free to call our office.    6.) MRI this year

## 2025-02-19 NOTE — ASSESSMENT & PLAN NOTE
-- Continue good blood pressure control  --Lipid panel is negative  --Smoking cessation strongly advised  Orders:    MRI abdomen wo contrast; Future    Albumin / creatinine urine ratio; Future    Comprehensive metabolic panel; Future    CBC and Platelet; Future    ALPRAZolam (XANAX) 1 mg tablet; Take 1 tablet (1 mg total) by mouth daily at bedtime as needed for anxiety (1 hour before MRI)

## 2025-02-19 NOTE — ASSESSMENT & PLAN NOTE
Lab Results   Component Value Date    EGFR 71 02/05/2025    EGFR 83 09/16/2024    EGFR 67 08/12/2024    CREATININE 1.04 02/05/2025    CREATININE 0.92 09/16/2024    CREATININE 1.10 08/12/2024      Chronic kidney disease stage II  --baseline creatinine low 1s, 0.9-1.2 mg/dL  --in setting of polycystic kidney disease and hypertension  --[place] urine analysis.  Albumin/creatinine ratio was normal.  There is no evidence of proteinuria  --Continue RAAS blockade with lisinopril  --Weight loss exercise diet strongly recommended    Hypertension  - Has a history of a 5 cm thoracic abdominal aortic aneurysm and ideally would aim to keep his systolic blood pressure less than 120 as tolerated  -target blood pressure less than 120/80  -continue metoprolol XL 75 mg daily, and lisinopril  -Blood pressure is usually under excellent control.  Slightly above target today but he got lost before finding this office  -Continue ALKA blockade in the setting of polycystic kidney disease    Family history of strokes  - CTA was negative for aneurysm  - Old left thalamic infarct.  -Continue blood pressure control    Orders:    MRI abdomen wo contrast; Future    Albumin / creatinine urine ratio; Future    Comprehensive metabolic panel; Future    CBC and Platelet; Future    ALPRAZolam (XANAX) 1 mg tablet; Take 1 tablet (1 mg total) by mouth daily at bedtime as needed for anxiety (1 hour before MRI)

## 2025-02-19 NOTE — ASSESSMENT & PLAN NOTE
- Strong family history with evidence of cysts on both kidneys that are multiple and cyst on the liver  - CTA of the head and neck was negative for aneurysm. No repeat is needed  - Creatinine baseline is around 1-1.2 mg/dL  -[place] urine analysis  -No evidence of proteinuria  -Renal function remains stable with a creatinine of 1 mg/dL and a GFR greater than 60 mL/min  -Currently asymptomatic no gross hematuria no flank pain  --MRI from 2023 reviewed.  Simple renal and hepatic cysts.  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  No suspicious renal lesions.  -Continue current management  -Repeat MRI without contrast this year.  Will give him 1 mg of Xanax 1 hour prior to procedure    Orders:    MRI abdomen wo contrast; Future    Albumin / creatinine urine ratio; Future    Comprehensive metabolic panel; Future    CBC and Platelet; Future    ALPRAZolam (XANAX) 1 mg tablet; Take 1 tablet (1 mg total) by mouth daily at bedtime as needed for anxiety (1 hour before MRI)

## 2025-02-24 ENCOUNTER — OFFICE VISIT (OUTPATIENT)
Dept: UROLOGY | Facility: AMBULATORY SURGERY CENTER | Age: 70
End: 2025-02-24
Payer: MEDICARE

## 2025-02-24 ENCOUNTER — TELEPHONE (OUTPATIENT)
Age: 70
End: 2025-02-24

## 2025-02-24 VITALS
OXYGEN SATURATION: 98 % | BODY MASS INDEX: 40.66 KG/M2 | SYSTOLIC BLOOD PRESSURE: 124 MMHG | WEIGHT: 284 LBS | DIASTOLIC BLOOD PRESSURE: 68 MMHG | HEIGHT: 70 IN | HEART RATE: 45 BPM

## 2025-02-24 DIAGNOSIS — I12.9 BENIGN HYPERTENSION WITH CKD (CHRONIC KIDNEY DISEASE), STAGE II: ICD-10-CM

## 2025-02-24 DIAGNOSIS — N18.2 BENIGN HYPERTENSION WITH CKD (CHRONIC KIDNEY DISEASE), STAGE II: ICD-10-CM

## 2025-02-24 DIAGNOSIS — I77.89 ARTERIAL ECTASIA (HCC): ICD-10-CM

## 2025-02-24 DIAGNOSIS — I72.4 POPLITEAL ARTERY ANEURYSM, BILATERAL (HCC): Primary | ICD-10-CM

## 2025-02-24 DIAGNOSIS — I71.20 THORACIC AORTIC ANEURYSM (TAA), UNSPECIFIED PART, UNSPECIFIED WHETHER RUPTURED (HCC): ICD-10-CM

## 2025-02-24 DIAGNOSIS — R97.20 ELEVATED PSA: Primary | ICD-10-CM

## 2025-02-24 DIAGNOSIS — I72.4 FEMORAL ARTERY ANEURYSM, BILATERAL (HCC): ICD-10-CM

## 2025-02-24 PROCEDURE — 99213 OFFICE O/P EST LOW 20 MIN: CPT | Performed by: UROLOGY
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
## 2025-02-24 NOTE — ASSESSMENT & PLAN NOTE
Pt with elevated but stable PSA and MRI showing Pirads 2 in March 2024.    Discussed options of repeating PSA vs repeat MRI vs prostate biopsy.  Recommend repeat PSA and if rising than repeating MRI.  He agrees. Repeat PSA in 3 months and fu.

## 2025-02-24 NOTE — TELEPHONE ENCOUNTER
Caller: Afshan Goddard    Doctor: Dr. Gallego    Reason for call: Wife called to obtain new order for STEPHEN due May 2025. Please order and contact the pt for scheduling. Thank you.    Call back#: [phone]

## 2025-02-25 RX ORDER — METOPROLOL SUCCINATE 25 MG/1
25 TABLET, EXTENDED RELEASE ORAL DAILY
Qty: 90 TABLET | Refills: 1 | Status: SHIPPED | OUTPATIENT
Start: 2025-02-25

## 2025-03-10 ENCOUNTER — OFFICE VISIT (OUTPATIENT)
Dept: FAMILY MEDICINE CLINIC | Facility: CLINIC | Age: 70
End: 2025-03-10
Payer: MEDICARE

## 2025-03-10 VITALS
BODY MASS INDEX: 41.17 KG/M2 | SYSTOLIC BLOOD PRESSURE: 112 MMHG | DIASTOLIC BLOOD PRESSURE: 66 MMHG | RESPIRATION RATE: 16 BRPM | HEART RATE: 54 BPM | HEIGHT: 70 IN | TEMPERATURE: 97.6 F | WEIGHT: 287.6 LBS | OXYGEN SATURATION: 98 %

## 2025-03-10 DIAGNOSIS — I10 ESSENTIAL HYPERTENSION: ICD-10-CM

## 2025-03-10 DIAGNOSIS — E78.5 HYPERLIPIDEMIA, MILD: ICD-10-CM

## 2025-03-10 DIAGNOSIS — G47.33 OSA (OBSTRUCTIVE SLEEP APNEA): ICD-10-CM

## 2025-03-10 DIAGNOSIS — Z00.00 MEDICARE ANNUAL WELLNESS VISIT, SUBSEQUENT: Primary | ICD-10-CM

## 2025-03-10 PROCEDURE — G0439 PPPS, SUBSEQ VISIT: HCPCS | Performed by: FAMILY MEDICINE

## 2025-03-10 PROCEDURE — 99214 OFFICE O/P EST MOD 30 MIN: CPT | Performed by: FAMILY MEDICINE

## 2025-03-10 PROCEDURE — G2211 COMPLEX E/M VISIT ADD ON: HCPCS | Performed by: FAMILY MEDICINE

## 2025-03-10 NOTE — ASSESSMENT & PLAN NOTE
Sleep apnea:  - Does not use CPAP machine  - Reports significant improvement in symptoms with adjustable bed

## 2025-03-10 NOTE — ASSESSMENT & PLAN NOTE
Cholesterol panel reviewed, improved from prev  Follows w cardiology  Not on cholesterol lowering medication  Encouraged healthy diet and exercise

## 2025-03-10 NOTE — PROGRESS NOTES
Name: Les Goddard      : 1955      MRN: [id number]  Encounter Provider: Frances Guillen MD  Encounter Date: 3/10/2025   Encounter department: [facility]    Assessment & Plan  Medicare annual wellness visit, subsequent  See plan as below  To consider rsv vaccine (to get at pharmacy)  He declines shingrix          Essential hypertension  Stable, controlled  Reviewed recent cardiology note  Continue f/u nephrology       Hyperlipidemia, mild  Cholesterol panel reviewed, improved from prev  Follows w cardiology  Not on cholesterol lowering medication  Encouraged healthy diet and exercise        ANASTASIIA (obstructive sleep apnea)  Sleep apnea:  - Does not use CPAP machine  - Reports significant improvement in symptoms with adjustable bed         BMI 40.0-44.9, adult (HCC)  Continue working on healthier diet            Preventive health issues were discussed with patient, and age appropriate screening tests were ordered as noted in patient's After Visit Summary. Personalized health advice and appropriate referrals for health education or preventive services given if needed, as noted in patient's After Visit Summary.    History of Present Illness       History of Present Illness  The patient presents for medicare annual wellness visit and f/u chronic health conditions. Wife present with him.  He follows up with urology (elevated PSA), nephro (polycystic kidney dz, htn), cardiology, pulmonary.    Congestive Heart Failure  - He has been experiencing satisfactory respiratory function for the past 3 months, describing his current state as one of normal breathing.  - He has discontinued the use of albuterol, which was previously prescribed when he was being evaluated for congestive heart failure.  - He monitors his pulse at home, which typically registers in the 50s.  - He undergoes biweekly platelet checks at the blood bank, where his vital signs are consistently stable.  - His cardiologist has  recently evaluated him and found no concerning issues.  - He reports no chest pain, palpitations, or fluttering sensations.  - He is currently on Lasix, which has significantly improved his condition.    Sleep Apnea  - He underwent a sleep study but did not proceed with CPAP therapy.  - He acquired an adjustable bed, which has notably improved his sleep quality.  - Inclining head of bed has reducing snoring and witnessed apneas per wife.    Health Maintenance  - He maintains a healthy diet, incorporating fruits and vegetables, but admits to eating too many processed meats like pepperoni and Wendy Ham.  - He experiences mild leg swelling, which remains stable.  - He regularly checks his weight in the mornings.  - He does not utilize a recliner, preferring to keep his legs on the floor.  - Doesn't use compression stockings- hard to put on (and usually wears shorts)  - He reports normal bowel movements and no presence of blood in his stool.  - Urinating normally    Supplemental information: He reports a general sense of well-being with no new issues.          Patient Care Team:  Frances Guillen MD as PCP - General (Family Medicine)  Shannan Davies MD as Consulting Physician (Nephrology)  Dominic Pena DO (Cardiothoracic Surgery)  Allison Gallego MD (Vascular Surgery)  Meera Quintana PA-C (Vascular Surgery)    Review of Systems  Annual Wellness Visit Questionnaire   Les is here for his Subsequent Wellness visit.     Health Risk Assessment:   Patient rates overall health as good. Patient feels that their physical health rating is same. Patient is satisfied with their life. Eyesight was rated as same. Hearing was rated as slightly worse. Patient feels that their emotional and mental health rating is slightly better. Patients states they are never, rarely angry. Patient states they are never, rarely unusually tired/fatigued. Pain experienced in the last 7 days has been some. Patient's pain rating has been 7/10.  Patient states that he has experienced no weight loss or gain in last 6 months.     Depression Screening:   PHQ-2 Score: 0      Fall Risk Screening:   In the past year, patient has experienced: no history of falling in past year      Home Safety:  Patient does not have trouble with stairs inside or outside of their home. Patient has working smoke alarms and has working carbon monoxide detector. Home safety hazards include: none.     Nutrition:   Current diet is Frequent junk food.     Medications:   Patient is currently taking over-the-counter supplements. OTC medications include: see medication list. Patient is able to manage medications.     Activities of Daily Living (ADLs)/Instrumental Activities of Daily Living (IADLs):   Walk and transfer into and out of bed and chair?: Yes  Dress and groom yourself?: Yes    Bathe or shower yourself?: Yes    Feed yourself? Yes  Do your laundry/housekeeping?: No  Manage your money, pay your bills and track your expenses?: Yes  Make your own meals?: No    Do your own shopping?: Yes    Previous Hospitalizations:   Any hospitalizations or ED visits within the last 12 months?: No      Advance Care Planning:   Living will: Yes    Durable POA for healthcare: No    Advanced directive: Yes      Cognitive Screening:   Provider or family/friend/caregiver concerned regarding cognition?: No    PREVENTIVE SCREENINGS      Cardiovascular Screening:    General: Screening Not Indicated and History Lipid Disorder      Diabetes Screening:     General: Screening Current      Colorectal Cancer Screening:     General: Screening Current      Prostate Cancer Screening:    General: Screening Current      Abdominal Aortic Aneurysm (AAA) Screening:    Risk factors include: age between 65-76 yo and tobacco use        Lung Cancer Screening:     General: Screening Not Indicated      Hepatitis C Screening:    General: Screening Current    Screening, Brief Intervention, and Referral to Treatment (SBIRT)  "    Screening  Typical number of drinks in a day: 0  Typical number of drinks in a week: 0  Interpretation: Low risk drinking behavior.    AUDIT-C Screenin) How often did you have a drink containing alcohol in the past year? monthly or less  2) How many drinks did you have on a typical day when you were drinking in the past year? 0  3) How often did you have 6 or more drinks on one occasion in the past year? never    AUDIT-C Score: 1  Interpretation: Score 0-3 (male): Negative screen for alcohol misuse    Single Item Drug Screening:  How often have you used an illegal drug (including marijuana) or a prescription medication for non-medical reasons in the past year? never    Single Item Drug Screen Score: 0  Interpretation: Negative screen for possible drug use disorder    Social Drivers of Health     Financial Resource Strain: Low Risk  (2024)    Overall Financial Resource Strain (CARDIA)    • Difficulty of Paying Living Expenses: Not hard at all   Food Insecurity: No Food Insecurity (3/5/2025)    Hunger Vital Sign    • Worried About Running Out of Food in the Last Year: Never true    • Ran Out of Food in the Last Year: Never true   Transportation Needs: No Transportation Needs (3/5/2025)    PRAPARE - Transportation    • Lack of Transportation (Medical): No    • Lack of Transportation (Non-Medical): No   Housing Stability: Low Risk  (3/5/2025)    Housing Stability Vital Sign    • Unable to Pay for Housing in the Last Year: No    • Number of Times Moved in the Last Year: 0    • Homeless in the Last Year: No   Utilities: Not At Risk (3/5/2025)    [facility] Utilities    • Threatened with loss of utilities: No     No results found.    Objective   /66   Pulse (!) 54   Temp 97.6 °F (36.4 °C) (Temporal)   Resp 16   Ht 5' 10\" (1.778 m)   Wt 130 kg (287 lb 9.6 oz)   SpO2 98%   BMI 41.27 kg/m²       Physical Exam  Vitals and nursing note reviewed.   Constitutional:       General: He is not in acute distress.   "   Appearance: Normal appearance. He is well-developed. He is not ill-appearing.   HENT:      Head: Normocephalic and atraumatic.   Eyes:      Conjunctiva/sclera: Conjunctivae normal.   Neck:      Vascular: No carotid bruit.   Cardiovascular:      Rate and Rhythm: Normal rate and regular rhythm.      Heart sounds: No murmur heard.  Pulmonary:      Effort: Pulmonary effort is normal. No respiratory distress.      Breath sounds: Normal breath sounds. No wheezing or rhonchi.   Abdominal:      Palpations: Abdomen is soft.      Tenderness: There is no abdominal tenderness. There is no guarding or rebound.   Musculoskeletal:      Cervical back: Neck supple.      Right lower leg: Edema (1+) present.      Left lower leg: Edema (1+) present.   Lymphadenopathy:      Cervical: No cervical adenopathy.   Skin:     General: Skin is warm and dry.   Neurological:      Mental Status: He is alert and oriented to person, place, and time.   Psychiatric:         Mood and Affect: Mood normal.         Behavior: Behavior normal.

## 2025-03-18 DIAGNOSIS — I10 ESSENTIAL HYPERTENSION: ICD-10-CM

## 2025-03-18 RX ORDER — LISINOPRIL 5 MG/1
5 TABLET ORAL DAILY
Qty: 90 TABLET | Refills: 1 | Status: SHIPPED | OUTPATIENT
Start: 2025-03-18

## 2025-04-05 ENCOUNTER — HOSPITAL ENCOUNTER (OUTPATIENT)
Dept: RADIOLOGY | Facility: HOSPITAL | Age: 70
Discharge: HOME | End: 2025-04-05
Attending: INTERNAL MEDICINE
Payer: MEDICARE

## 2025-04-05 DIAGNOSIS — I12.9 BENIGN HYPERTENSION WITH CKD (CHRONIC KIDNEY DISEASE), STAGE II: ICD-10-CM

## 2025-04-05 DIAGNOSIS — Q61.3 POLYCYSTIC KIDNEY DISEASE: ICD-10-CM

## 2025-04-05 DIAGNOSIS — I71.21 ANEURYSM OF ASCENDING AORTA WITHOUT RUPTURE (HCC): ICD-10-CM

## 2025-04-05 DIAGNOSIS — N18.2 BENIGN HYPERTENSION WITH CKD (CHRONIC KIDNEY DISEASE), STAGE II: ICD-10-CM

## 2025-04-05 DIAGNOSIS — Z72.0 TOBACCO ABUSE: ICD-10-CM

## 2025-04-05 DIAGNOSIS — R97.20 ELEVATED PSA: ICD-10-CM

## 2025-04-05 PROCEDURE — 74181 MRI ABDOMEN W/O CONTRAST: CPT

## 2025-04-07 ENCOUNTER — RESULTS FOLLOW-UP (OUTPATIENT)
Dept: OTHER | Facility: HOSPITAL | Age: 70
End: 2025-04-07

## 2025-05-07 ENCOUNTER — TELEPHONE (OUTPATIENT)
Dept: UROLOGY | Facility: CLINIC | Age: 70
End: 2025-05-07

## 2025-05-08 ENCOUNTER — HOSPITAL ENCOUNTER (OUTPATIENT)
Dept: RADIOLOGY | Facility: HOSPITAL | Age: 70
Discharge: HOME | End: 2025-05-08
Attending: SURGERY
Payer: MEDICARE

## 2025-05-08 ENCOUNTER — RESULTS FOLLOW-UP (OUTPATIENT)
Dept: UROLOGY | Facility: CLINIC | Age: 70
End: 2025-05-08

## 2025-05-08 ENCOUNTER — APPOINTMENT (OUTPATIENT)
Dept: LAB | Facility: HOSPITAL | Age: 70
End: 2025-05-08
Attending: UROLOGY
Payer: MEDICARE

## 2025-05-08 DIAGNOSIS — I72.4 FEMORAL ARTERY ANEURYSM, BILATERAL (HCC): ICD-10-CM

## 2025-05-08 DIAGNOSIS — R97.20 ELEVATED PSA: ICD-10-CM

## 2025-05-08 DIAGNOSIS — I77.89 ARTERIAL ECTASIA (HCC): ICD-10-CM

## 2025-05-08 DIAGNOSIS — I72.4 POPLITEAL ARTERY ANEURYSM, BILATERAL (HCC): ICD-10-CM

## 2025-05-08 LAB — PSA SERPL-MCNC: 7.14 NG/ML (ref 0–4)

## 2025-05-08 PROCEDURE — 93925 LOWER EXTREMITY STUDY: CPT

## 2025-05-08 PROCEDURE — 93923 UPR/LXTR ART STDY 3+ LVLS: CPT

## 2025-05-08 PROCEDURE — 84153 ASSAY OF PSA TOTAL: CPT

## 2025-05-08 PROCEDURE — 36415 COLL VENOUS BLD VENIPUNCTURE: CPT

## 2025-05-09 PROCEDURE — 93925 LOWER EXTREMITY STUDY: CPT | Performed by: SURGERY

## 2025-05-09 PROCEDURE — 93922 UPR/L XTREMITY ART 2 LEVELS: CPT | Performed by: SURGERY

## 2025-05-12 ENCOUNTER — PATIENT MESSAGE (OUTPATIENT)
Dept: FAMILY MEDICINE CLINIC | Facility: CLINIC | Age: 70
End: 2025-05-12

## 2025-05-12 DIAGNOSIS — F41.8 SITUATIONAL ANXIETY: Primary | ICD-10-CM

## 2025-05-14 RX ORDER — ALPRAZOLAM 0.25 MG
TABLET ORAL
Qty: 10 TABLET | Refills: 0 | Status: SHIPPED | OUTPATIENT
Start: 2025-05-14

## 2025-06-03 NOTE — PROGRESS NOTES
Name: Les Goddard      : 1955      MRN: [id number]  Encounter Provider: Dannie hCavez PA-C  Encounter Date: 2025   Encounter department: [facility]  :  Assessment & Plan  Elevated PSA    Orders:    PSA Total, Diagnostic; Future  Follow-up in 6 months with PSA prior to visit      History of Present Illness   Les Goddard is a 70 y.o. male who presents history of elevated PSA.  He had a negative prostate biopsy in .  His MRI in  showed PI-RADS 2.  Repeat MRI last year also showed PI-RADS 2 with a 85 g prostate.  Current PSA remains stable 7.1.  Previously 7.2.  No voiding complaints.    Review of Systems       Objective   There were no vitals taken for this visit.    Physical Exam GEN: no acute distress    RESP: breathing comfortably with no accessory muscle use    ABD: soft, non-tender, non-distended   INCISION:    EXT: no significant peripheral edema       RADIOLOGY:   IMPRESSION:     1. PI-RADSv2.1 Category 2 - Low (clinically significant cancer is unlikely to be present).     2. No extraprostatic tumor, seminal vesicle invasion, pelvic lymphadenopathy, or pelvic osseous metastatic disease.     3. Marked BPH with calculated prostate volume of 85 cc        Results   Lab Results   Component Value Date    PSA 7.140 (H) 2025    PSA 7.233 (H) 2024    PSA 7.24 (H) 2024     Lab Results   Component Value Date    GLUCOSE 109 (H) 2016    CALCIUM 9.1 2025     2016    K 4.3 2025    CO2 22 2025     2025    BUN 24 2025    CREATININE 1.04 2025     Lab Results   Component Value Date    WBC 5.43 2025    HGB 15.2 2025    HCT 45.5 2025    MCV 84 2025     2025       Office Urine Dip  No results found for this or any previous visit (from the past hour).

## 2025-06-04 ENCOUNTER — OFFICE VISIT (OUTPATIENT)
Dept: UROLOGY | Facility: CLINIC | Age: 70
End: 2025-06-04
Payer: MEDICARE

## 2025-06-04 ENCOUNTER — OFFICE VISIT (OUTPATIENT)
Dept: VASCULAR SURGERY | Facility: CLINIC | Age: 70
End: 2025-06-04
Payer: MEDICARE

## 2025-06-04 VITALS
SYSTOLIC BLOOD PRESSURE: 146 MMHG | HEIGHT: 70 IN | DIASTOLIC BLOOD PRESSURE: 80 MMHG | BODY MASS INDEX: 40.66 KG/M2 | WEIGHT: 284 LBS | TEMPERATURE: 98.4 F | HEART RATE: 70 BPM | OXYGEN SATURATION: 94 %

## 2025-06-04 VITALS
OXYGEN SATURATION: 95 % | SYSTOLIC BLOOD PRESSURE: 140 MMHG | DIASTOLIC BLOOD PRESSURE: 82 MMHG | BODY MASS INDEX: 41.18 KG/M2 | HEART RATE: 60 BPM | WEIGHT: 287 LBS

## 2025-06-04 DIAGNOSIS — R97.20 ELEVATED PSA: Primary | ICD-10-CM

## 2025-06-04 DIAGNOSIS — I72.4 FEMORAL ARTERY ANEURYSM, BILATERAL (HCC): Primary | ICD-10-CM

## 2025-06-04 DIAGNOSIS — I72.4 POPLITEAL ARTERY ANEURYSM, BILATERAL (HCC): ICD-10-CM

## 2025-06-04 PROCEDURE — 99213 OFFICE O/P EST LOW 20 MIN: CPT | Performed by: PHYSICIAN ASSISTANT

## 2025-06-04 PROCEDURE — 99212 OFFICE O/P EST SF 10 MIN: CPT | Performed by: SURGERY

## 2025-06-04 NOTE — ASSESSMENT & PLAN NOTE
Bilateral femoral artery ectasia   Remains completely stable over past 10 years    We will check every 2 years.    Orders:  •  VAS ARTERIAL DUPLEX- LOWER LIMB BILATERAL; Future

## 2025-06-04 NOTE — PROGRESS NOTES
"Name: Les Goddard      : 1955      MRN: [id number]  Encounter Provider: Allison Gallego MD  Encounter Date: 2025   [facility]  :  Assessment & Plan  Femoral artery aneurysm, bilateral (HCC)  Bilateral femoral artery ectasia   Remains completely stable over past 10 years    We will check every 2 years.    Orders:  •  VAS ARTERIAL DUPLEX- LOWER LIMB BILATERAL; Future    Popliteal artery ectasias, bilateral (HCC)  0.8 - 1 cm popliteal ectasia.  Every 2 year follow up.    His BP remains controlled with lisinopril and metoprolol 25mg XL  Orders:  •  VAS ARTERIAL DUPLEX- LOWER LIMB BILATERAL; Future        History of Present Illness   HPI  Les Goddard is a 70 y.o. male who presents for follow up of femoral artery ectasia.    Patient presents to review STEPHEN , 2 year follow-up.       Review of Systems   All other systems reviewed and are negative.         Objective   /80 (BP Location: Left arm, Patient Position: Sitting, Cuff Size: Large)   Pulse 70   Temp 98.4 °F (36.9 °C) (Temporal)   Ht 5' 10\" (1.778 m)   Wt 129 kg (284 lb)   SpO2 94%   BMI 40.75 kg/m²      Physical Exam  Vitals and nursing note reviewed.   Constitutional:       Appearance: Normal appearance. He is obese.     Cardiovascular:      Rate and Rhythm: Normal rate.      Pulses:           Popliteal pulses are 2+ on the right side and 2+ on the left side.        Dorsalis pedis pulses are 2+ on the right side and 2+ on the left side.     Musculoskeletal:      Right lower leg: No edema.      Left lower leg: No edema.     Neurological:      Mental Status: He is alert.           "

## 2025-06-04 NOTE — ASSESSMENT & PLAN NOTE
0.8 - 1 cm popliteal ectasia.  Every 2 year follow up.    His BP remains controlled with lisinopril and metoprolol 25mg XL  Orders:  •  VAS ARTERIAL DUPLEX- LOWER LIMB BILATERAL; Future

## 2025-06-17 DIAGNOSIS — E78.5 HYPERLIPIDEMIA, MILD: ICD-10-CM

## 2025-06-17 DIAGNOSIS — I10 ESSENTIAL HYPERTENSION: ICD-10-CM

## 2025-06-17 DIAGNOSIS — R60.9 EDEMA, UNSPECIFIED TYPE: ICD-10-CM

## 2025-06-17 RX ORDER — FUROSEMIDE 20 MG/1
20 TABLET ORAL DAILY
Qty: 90 TABLET | Refills: 1 | Status: SHIPPED | OUTPATIENT
Start: 2025-06-17

## 2025-08-18 ENCOUNTER — TELEPHONE (OUTPATIENT)
Age: 70
End: 2025-08-18

## 2025-08-22 DIAGNOSIS — N18.2 BENIGN HYPERTENSION WITH CKD (CHRONIC KIDNEY DISEASE), STAGE II: ICD-10-CM

## 2025-08-22 DIAGNOSIS — I12.9 BENIGN HYPERTENSION WITH CKD (CHRONIC KIDNEY DISEASE), STAGE II: ICD-10-CM

## 2025-08-22 DIAGNOSIS — I71.20 THORACIC AORTIC ANEURYSM (TAA), UNSPECIFIED PART, UNSPECIFIED WHETHER RUPTURED (HCC): ICD-10-CM

## 2025-08-22 RX ORDER — METOPROLOL SUCCINATE 25 MG/1
25 TABLET, EXTENDED RELEASE ORAL DAILY
Qty: 90 TABLET | Refills: 1 | Status: SHIPPED | OUTPATIENT
Start: 2025-08-22

## (undated) DEVICE — STRETCH BANDAGE: Brand: CURITY

## (undated) DEVICE — GLOVE SRG BIOGEL 8.5

## (undated) DEVICE — TIBURON EXTREMITY SHEET: Brand: CONVERTORS

## (undated) DEVICE — FABRIC REINFORCED, SURGICAL GOWN, XL: Brand: CONVERTORS

## (undated) DEVICE — ACE WRAP 6 IN STERILE

## (undated) DEVICE — SYRINGE 10ML LL CONTROL TOP

## (undated) DEVICE — BASIC DOUBLE BASIN 2-LF: Brand: MEDLINE INDUSTRIES, INC.

## (undated) DEVICE — SUT VICRYL 4-0 PS-2 18 IN J496G

## (undated) DEVICE — FIBERTAPE 2MM X 7IN AR-7237-7

## (undated) DEVICE — ABDOMINAL PAD: Brand: DERMACEA

## (undated) DEVICE — BANDAGE, ESMARK LF STR 6"X9' (20/CS): Brand: CYPRESS

## (undated) DEVICE — 3M™ STERI-STRIP™ REINFORCED ADHESIVE SKIN CLOSURES, R1547, 1/2 IN X 4 IN (12 MM X 100 MM), 6 STRIPS/ENVELOPE: Brand: 3M™ STERI-STRIP™

## (undated) DEVICE — GLOVE INDICATOR PI UNDERGLOVE SZ 8.5 BLUE

## (undated) DEVICE — GLOVE SRG BIOGEL 7.5

## (undated) DEVICE — REPEL LITE CUT REST SURGICAL GLV LNRS X-LG: Brand: REPEL

## (undated) DEVICE — SPONGE LAP 18 X 18 IN

## (undated) DEVICE — DRESSING MEPILEX AG BORDER 4 X 8 IN

## (undated) DEVICE — INTENDED FOR TISSUE SEPARATION, AND OTHER PROCEDURES THAT REQUIRE A SHARP SURGICAL BLADE TO PUNCTURE OR CUT.: Brand: BARD-PARKER SAFETY BLADES SIZE 15, STERILE

## (undated) DEVICE — STOCKINETTE,IMPERVIOUS,12X48,STERILE: Brand: MEDLINE

## (undated) DEVICE — PACK GENERAL LF

## (undated) DEVICE — SUT VICRYL 0 CT-1 18 IN J740D

## (undated) DEVICE — DRAPE SHEET THREE QUARTER

## (undated) DEVICE — LABEL MEDICATION STERILE 2 YELLOW LIDOCAINE 2 BLUE MARCAINE 2 ORANGE HEPARIN

## (undated) DEVICE — 3M™ COBAN™ NL STERILE NON-LATEX SELF-ADHERENT WRAP, 2084S, 4 IN X 5 YD (10 CM X 4,5 M), 18 ROLLS/CASE: Brand: 3M™ COBAN™

## (undated) DEVICE — TRANSPORTER SUTURE RETRIEVER, STERILE: Brand: TRANSPORTER

## (undated) DEVICE — ADHESIVE SKIN HIGH VISCOSITY EXOFIN 1ML

## (undated) DEVICE — CHLORAPREP HI-LITE 26ML ORANGE

## (undated) DEVICE — GLOVE INDICATOR PI UNDERGLOVE SZ 7.5 BLUE

## (undated) DEVICE — NEEDLE 21 G X 1 1/2 SAFETY

## (undated) DEVICE — SUT ETHIBOND 5 V-37 30 IN MB66G

## (undated) DEVICE — SUT VICRYL 2-0 CT-1 27 IN J259H